# Patient Record
Sex: FEMALE | Race: WHITE | NOT HISPANIC OR LATINO | Employment: UNEMPLOYED | URBAN - METROPOLITAN AREA
[De-identification: names, ages, dates, MRNs, and addresses within clinical notes are randomized per-mention and may not be internally consistent; named-entity substitution may affect disease eponyms.]

---

## 2017-06-07 ENCOUNTER — HOSPITAL ENCOUNTER (INPATIENT)
Facility: HOSPITAL | Age: 77
LOS: 7 days | Discharge: NURSING, REHAB OR LONG-TERM CARE | DRG: 481 | End: 2017-06-14
Attending: EMERGENCY MEDICINE | Admitting: HOSPITALIST
Payer: MEDICARE

## 2017-06-07 DIAGNOSIS — S72.002A CLOSED LEFT HIP FRACTURE, INITIAL ENCOUNTER: Primary | ICD-10-CM
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
## 2017-06-07 DIAGNOSIS — Z01.818 PRE-OP TESTING: ICD-10-CM

## 2017-06-07 DIAGNOSIS — G89.11 ACUTE TRAUMATIC PAIN: ICD-10-CM

## 2017-06-07 LAB
ALBUMIN SERPL BCP-MCNC: 3.8 G/DL
ALP SERPL-CCNC: 92 U/L
ALT SERPL W/O P-5'-P-CCNC: 28 U/L
ANION GAP SERPL CALC-SCNC: 7 MMOL/L
AST SERPL-CCNC: 33 U/L
BASOPHILS # BLD AUTO: 0.03 K/UL
BASOPHILS NFR BLD: 0.4 %
BILIRUB SERPL-MCNC: 0.5 MG/DL
BILIRUB UR QL STRIP: NEGATIVE
BUN SERPL-MCNC: 16 MG/DL
CALCIUM SERPL-MCNC: 9.6 MG/DL
CHLORIDE SERPL-SCNC: 103 MMOL/L
CLARITY UR: CLEAR
CO2 SERPL-SCNC: 29 MMOL/L
COLOR UR: YELLOW
CREAT SERPL-MCNC: 0.8 MG/DL
DIFFERENTIAL METHOD: ABNORMAL
EOSINOPHIL # BLD AUTO: 0.4 K/UL
EOSINOPHIL NFR BLD: 6.1 %
ERYTHROCYTE [DISTWIDTH] IN BLOOD BY AUTOMATED COUNT: 14.3 %
EST. GFR  (AFRICAN AMERICAN): >60 ML/MIN/1.73 M^2
EST. GFR  (NON AFRICAN AMERICAN): >60 ML/MIN/1.73 M^2
GLUCOSE SERPL-MCNC: 106 MG/DL
GLUCOSE UR QL STRIP: NEGATIVE
HCT VFR BLD AUTO: 41.2 %
HGB BLD-MCNC: 13.8 G/DL
HGB UR QL STRIP: NEGATIVE
INR PPP: 1
KETONES UR QL STRIP: NEGATIVE
LEUKOCYTE ESTERASE UR QL STRIP: NEGATIVE
LYMPHOCYTES # BLD AUTO: 1.1 K/UL
LYMPHOCYTES NFR BLD: 15.3 %
MCH RBC QN AUTO: 34 PG
MCHC RBC AUTO-ENTMCNC: 33.5 %
MCV RBC AUTO: 102 FL
MONOCYTES # BLD AUTO: 0.9 K/UL
MONOCYTES NFR BLD: 12.3 %
NEUTROPHILS # BLD AUTO: 4.7 K/UL
NEUTROPHILS NFR BLD: 65.2 %
NITRITE UR QL STRIP: NEGATIVE
PH UR STRIP: 7 [PH] (ref 5–8)
PLATELET # BLD AUTO: 190 K/UL
PMV BLD AUTO: 9.6 FL
POTASSIUM SERPL-SCNC: 4.3 MMOL/L
PROT SERPL-MCNC: 7 G/DL
PROT UR QL STRIP: NEGATIVE
PROTHROMBIN TIME: 10.7 SEC
RBC # BLD AUTO: 4.06 M/UL
SODIUM SERPL-SCNC: 139 MMOL/L
SP GR UR STRIP: 1.01 (ref 1–1.03)
URN SPEC COLLECT METH UR: NORMAL
UROBILINOGEN UR STRIP-ACNC: NEGATIVE EU/DL
WBC # BLD AUTO: 7.17 K/UL

## 2017-06-07 PROCEDURE — 85610 PROTHROMBIN TIME: CPT

## 2017-06-07 PROCEDURE — 96375 TX/PRO/DX INJ NEW DRUG ADDON: CPT

## 2017-06-07 PROCEDURE — 87086 URINE CULTURE/COLONY COUNT: CPT

## 2017-06-07 PROCEDURE — 99285 EMERGENCY DEPT VISIT HI MDM: CPT | Mod: 25

## 2017-06-07 PROCEDURE — 12000002 HC ACUTE/MED SURGE SEMI-PRIVATE ROOM

## 2017-06-07 PROCEDURE — 85025 COMPLETE CBC W/AUTO DIFF WBC: CPT

## 2017-06-07 PROCEDURE — 80053 COMPREHEN METABOLIC PANEL: CPT

## 2017-06-07 PROCEDURE — 81003 URINALYSIS AUTO W/O SCOPE: CPT

## 2017-06-07 PROCEDURE — 96374 THER/PROPH/DIAG INJ IV PUSH: CPT

## 2017-06-07 PROCEDURE — 93005 ELECTROCARDIOGRAM TRACING: CPT

## 2017-06-07 PROCEDURE — 63600175 PHARM REV CODE 636 W HCPCS: Performed by: EMERGENCY MEDICINE

## 2017-06-07 RX ORDER — TRAMADOL HYDROCHLORIDE 50 MG/1
50 TABLET ORAL EVERY 6 HOURS PRN
Status: ON HOLD | COMMUNITY
End: 2017-06-14

## 2017-06-07 RX ORDER — ONDANSETRON 2 MG/ML
4 INJECTION INTRAMUSCULAR; INTRAVENOUS EVERY 8 HOURS PRN
Status: DISCONTINUED | OUTPATIENT
Start: 2017-06-07 | End: 2017-06-08

## 2017-06-07 RX ORDER — SODIUM CHLORIDE 9 MG/ML
INJECTION, SOLUTION INTRAVENOUS CONTINUOUS
Status: DISCONTINUED | OUTPATIENT
Start: 2017-06-08 | End: 2017-06-08

## 2017-06-07 RX ORDER — ONDANSETRON 2 MG/ML
4 INJECTION INTRAMUSCULAR; INTRAVENOUS
Status: COMPLETED | OUTPATIENT
Start: 2017-06-07 | End: 2017-06-07

## 2017-06-07 RX ORDER — MEMANTINE HYDROCHLORIDE 10 MG/1
28 TABLET ORAL DAILY
Status: ON HOLD | COMMUNITY
End: 2018-04-28 | Stop reason: HOSPADM

## 2017-06-07 RX ORDER — HYDROMORPHONE HYDROCHLORIDE 2 MG/ML
0.5 INJECTION, SOLUTION INTRAMUSCULAR; INTRAVENOUS; SUBCUTANEOUS
Status: COMPLETED | OUTPATIENT
Start: 2017-06-07 | End: 2017-06-07

## 2017-06-07 RX ORDER — SODIUM CHLORIDE 0.9 % (FLUSH) 0.9 %
3 SYRINGE (ML) INJECTION EVERY 8 HOURS PRN
Status: DISCONTINUED | OUTPATIENT
Start: 2017-06-07 | End: 2017-06-08

## 2017-06-07 RX ORDER — MORPHINE SULFATE 10 MG/ML
4 INJECTION INTRAMUSCULAR; INTRAVENOUS; SUBCUTANEOUS EVERY 4 HOURS PRN
Status: DISCONTINUED | OUTPATIENT
Start: 2017-06-07 | End: 2017-06-08

## 2017-06-07 RX ADMIN — HYDROMORPHONE HYDROCHLORIDE 0.5 MG: 2 INJECTION INTRAMUSCULAR; INTRAVENOUS; SUBCUTANEOUS at 06:06

## 2017-06-07 RX ADMIN — MORPHINE SULFATE 4 MG: 10 INJECTION INTRAVENOUS at 09:06

## 2017-06-07 RX ADMIN — ONDANSETRON 4 MG: 2 INJECTION INTRAMUSCULAR; INTRAVENOUS at 06:06

## 2017-06-07 RX ADMIN — SODIUM CHLORIDE: 0.9 INJECTION, SOLUTION INTRAVENOUS at 11:06

## 2017-06-07 NOTE — ED PROVIDER NOTES
Encounter Date: 6/7/2017    SCRIBE #1 NOTE: I, Eulalio Montilla, am scribing for, and in the presence of,  Sergio North MD. I have scribed the following portions of the note - Other sections scribed: HPI and ROS.       History     Chief Complaint   Patient presents with    Hip Pain     Pt. presents from home after fall. Per family report, pt. was reaching for walker while she fell. EMS personnel state that patient has shortening and rotation of the left leg. Pt. awake and alert.      Review of patient's allergies indicates:   Allergen Reactions    Statins-hmg-coa reductase inhibitors Other (See Comments)     2013: Severe muscle pain.    Demerol [meperidine]     Sulfa (sulfonamide antibiotics)      CC: Hip Pain     HPI: This 76 y.o F with HTN, asthma, RA, hypercholesteremia, hypothyroidism and PMHx of carotid stenosis, R bundle branch block and syncope presents to the the ED for emergent evaluation of a slip and fall PTA. The pt also reports severe (8/10) L side hip pain. The pt's  states the pt was reaching for her walker while in the bathroom, which slipped and caused her to fall. The pt also reports abrasions to the L forearm. The pt denies ankle pain, knee pain, head trauma and LOC. Pt is currently Rx 81 mg of aspirin daily. No prior tx.       The history is provided by the patient and the spouse. No  was used.     Past Medical History:   Diagnosis Date    Asthma     Carotid stenosis 10/7/2013    10/3/2013: Carotid Duplex: EVERT: mild. RECA: 2.0 m/s. LICA: mild    History of syncope 4/16/2015 4/13/2015: Syncope.    HTN (hypertension), malignant 3/14/2013    Hypercholesteremia 3/14/2013    Hypertension     Hypothyroidism 4/16/2015 4/13/2015: Syncope.    Rheumatoid arthritis 3/14/2013    Dr Grimes follows.    Right bundle branch block 2/6/2015 2/6/2015: Right bundle branch block noted.    Thyroid disease     Hypothyroid     Past Surgical History:   Procedure  Laterality Date    BACK SURGERY      KNEE SURGERY  12/2012    After fall     History reviewed. No pertinent family history.  Social History   Substance Use Topics    Smoking status: Former Smoker     Packs/day: 0.50     Years: 4.00     Start date: 1/1/1960     Quit date: 1/1/1964    Smokeless tobacco: Never Used    Alcohol use Yes      Comment: Wine socially     Review of Systems   Constitutional: Negative for chills and fever.   HENT: Negative for nosebleeds and sore throat.    Eyes: Negative for visual disturbance.   Respiratory: Negative for shortness of breath.    Cardiovascular: Negative for chest pain.   Gastrointestinal: Negative for nausea and vomiting.   Genitourinary: Negative for dysuria.   Musculoskeletal: Negative for back pain.        (+) L side hip pain  (-) ankle pain  (-) knee pain    Skin: Negative for rash.        (+) abrasions to L forearm   Neurological: Negative for weakness.        (-) head trauma   (-) LOC       Physical Exam     Initial Vitals [06/07/17 1804]   BP Pulse Resp Temp SpO2   (!) 144/70 90 17 98.4 °F (36.9 °C) 97 %     Physical Exam  Nursing note and vitals reviewed.  Constitutional:  Healthy appearing elderly female in no obvious distress or discomfort  HENT:    Head: NC/AT    Eyes: Conjunctivae normal.   (-) scleral icterus.              Mouth/Throat: MMM.     Neck: Neck supple, normal rom.  Negative cervical ttp  Cardiovascular: RRR  Pulmonary/Chest: CTAB   Abdominal: Soft. ND/NT w/o guarding or rebound.  (-) CVA tenderness.  Musculoskeletal: Left anterior lateral hip ttp w/ associated decreased ROM.    FROM of all other major joints.  n/v intact.   Neurological: A&Ox2 (person and place), Normal speech.  No acute focal motor deficits.     Skin: Skin is warm and dry.   Psychiatric: normal mood and affect.      ED Course   Orthopedic Injury  Date/Time: 6/7/2017 8:21 PM  Authorized by: STEVE FRANKS   Performed by: STEVE FRANKS  Injury location: hip  Location  details: left hip  Injury type: fracture  Fracture type: intertrochanteric  Pre-procedure distal perfusion: normal  Pre-procedure neurological function: normal  Pre-procedure neurovascular assessment: neurovascularly intact  Pre-procedure range of motion: reduced  Manipulation performed: no        Labs Reviewed   CBC W/ AUTO DIFFERENTIAL - Abnormal; Notable for the following:        Result Value     (*)     MCH 34.0 (*)     Lymph% 15.3 (*)     All other components within normal limits   COMPREHENSIVE METABOLIC PANEL - Abnormal; Notable for the following:     Anion Gap 7 (*)     All other components within normal limits   CULTURE, URINE   PROTIME-INR   URINALYSIS     EKG Readings: (Independently Interpreted)   Initial Reading: No STEMI.   Normal sinus rhythm, rate 83, RBBB, no ST/T-wave abnormalities.       X-Rays:   Independently Interpreted Readings:   Other Readings:  X-ray left hip - acute left intertrochanteric fracture.    Medical Decision Making:   History:   I obtained history from: someone other than patient.  Old Medical Records: I decided to obtain old medical records.  Old Records Summarized: records from clinic visits and other records.  Independently Interpreted Test(s):   I have ordered and independently interpreted X-rays - see prior notes.  I have ordered and independently interpreted EKG Reading(s) - see prior notes  Clinical Tests:   Lab Tests: Ordered and Reviewed  Radiological Study: Ordered and Reviewed  Medical Tests: Ordered and Reviewed    Differential diagnosis:   Initial differential diagnosis includes but is not limited to... Soft tissue contusion, sprain, fracture and/or dislocation    Additional Medical Decision Makin-year-old female with history of hypertension, rheumatoid arthritis, dyslipidemia, and hypothyroidism is in the emergency department via EMS c/o left hip pain status post mechanical fall - see hpi for additional details.  On exam, she has tenderness to her left  anterior lateral with associated decreased range of motion secondary to pain.  LLE neurovascular intact.  X-rays left hip as well as screening EKG and basic labs pending.    7:20 PM - x-rays of the left hip reveal an acute intertrochanteric fracture.    Orthopedics consulted (Dr. Conteh).  She will be admitted for further evaluation and management including emergent ORIF.             Scribe Attestation:   Scribe #1: I performed the above scribed service and the documentation accurately describes the services I performed. I attest to the accuracy of the note.    Attending Attestation:           Physician Attestation for Scribe:  Physician Attestation Statement for Scribe #1: I, Sergio North MD, reviewed documentation, as scribed by Eulalio Montilla in my presence, and it is both accurate and complete.                 ED Course     Clinical Impression:   The primary encounter diagnosis was Closed left hip fracture, initial encounter. Diagnoses of Acute traumatic pain and Fall were also pertinent to this visit.    Disposition:   Disposition: Admitted       Sergio North MD  06/07/17 2021       Sergio North MD  06/07/17 2021

## 2017-06-08 ENCOUNTER — ANESTHESIA (OUTPATIENT)
Dept: SURGERY | Facility: HOSPITAL | Age: 77
DRG: 481 | End: 2017-06-08
Payer: MEDICARE

## 2017-06-08 ENCOUNTER — ANESTHESIA EVENT (OUTPATIENT)
Dept: SURGERY | Facility: HOSPITAL | Age: 77
DRG: 481 | End: 2017-06-08
Payer: MEDICARE

## 2017-06-08 PROBLEM — F03.918 DEMENTIA WITH BEHAVIORAL DISTURBANCE: Chronic | Status: ACTIVE | Noted: 2017-06-08

## 2017-06-08 PROBLEM — F32.A DEPRESSION: Chronic | Status: ACTIVE | Noted: 2017-06-08

## 2017-06-08 PROBLEM — I10 ESSENTIAL HYPERTENSION: Chronic | Status: ACTIVE | Noted: 2017-06-08

## 2017-06-08 PROBLEM — F03.90 DEMENTIA WITHOUT BEHAVIORAL DISTURBANCE: Status: ACTIVE | Noted: 2017-06-08

## 2017-06-08 PROBLEM — F03.90 DEMENTIA WITHOUT BEHAVIORAL DISTURBANCE: Chronic | Status: ACTIVE | Noted: 2017-06-08

## 2017-06-08 PROBLEM — S72.142A CLOSED INTERTROCHANTERIC FRACTURE OF LEFT HIP: Status: ACTIVE | Noted: 2017-06-07

## 2017-06-08 LAB
ALBUMIN SERPL BCP-MCNC: 3.5 G/DL
ALP SERPL-CCNC: 80 U/L
ALT SERPL W/O P-5'-P-CCNC: 37 U/L
ANION GAP SERPL CALC-SCNC: 6 MMOL/L
ANION GAP SERPL CALC-SCNC: 7 MMOL/L
AST SERPL-CCNC: 50 U/L
BASOPHILS # BLD AUTO: 0.02 K/UL
BASOPHILS NFR BLD: 0.3 %
BILIRUB SERPL-MCNC: 0.6 MG/DL
BUN SERPL-MCNC: 14 MG/DL
BUN SERPL-MCNC: 15 MG/DL
CALCIUM SERPL-MCNC: 8.6 MG/DL
CALCIUM SERPL-MCNC: 9.1 MG/DL
CHLORIDE SERPL-SCNC: 104 MMOL/L
CHLORIDE SERPL-SCNC: 104 MMOL/L
CO2 SERPL-SCNC: 26 MMOL/L
CO2 SERPL-SCNC: 26 MMOL/L
CREAT SERPL-MCNC: 0.7 MG/DL
CREAT SERPL-MCNC: 0.7 MG/DL
DIFFERENTIAL METHOD: ABNORMAL
EOSINOPHIL # BLD AUTO: 0.1 K/UL
EOSINOPHIL NFR BLD: 0.8 %
ERYTHROCYTE [DISTWIDTH] IN BLOOD BY AUTOMATED COUNT: 14.6 %
EST. GFR  (AFRICAN AMERICAN): >60 ML/MIN/1.73 M^2
EST. GFR  (AFRICAN AMERICAN): >60 ML/MIN/1.73 M^2
EST. GFR  (NON AFRICAN AMERICAN): >60 ML/MIN/1.73 M^2
EST. GFR  (NON AFRICAN AMERICAN): >60 ML/MIN/1.73 M^2
GLUCOSE SERPL-MCNC: 112 MG/DL
GLUCOSE SERPL-MCNC: 120 MG/DL
HCT VFR BLD AUTO: 35 %
HCT VFR BLD AUTO: 38.6 %
HGB BLD-MCNC: 11.5 G/DL
HGB BLD-MCNC: 13.1 G/DL
LYMPHOCYTES # BLD AUTO: 1.1 K/UL
LYMPHOCYTES NFR BLD: 14.1 %
MCH RBC QN AUTO: 34.8 PG
MCHC RBC AUTO-ENTMCNC: 33.9 %
MCV RBC AUTO: 103 FL
MONOCYTES # BLD AUTO: 1 K/UL
MONOCYTES NFR BLD: 13 %
NEUTROPHILS # BLD AUTO: 5.5 K/UL
NEUTROPHILS NFR BLD: 71.8 %
PLATELET # BLD AUTO: 162 K/UL
PMV BLD AUTO: 10.1 FL
POTASSIUM SERPL-SCNC: 3.9 MMOL/L
POTASSIUM SERPL-SCNC: 4.2 MMOL/L
PROT SERPL-MCNC: 6.7 G/DL
RBC # BLD AUTO: 3.76 M/UL
SODIUM SERPL-SCNC: 136 MMOL/L
SODIUM SERPL-SCNC: 137 MMOL/L
WBC # BLD AUTO: 7.68 K/UL

## 2017-06-08 PROCEDURE — 63600175 PHARM REV CODE 636 W HCPCS: Performed by: NURSE ANESTHETIST, CERTIFIED REGISTERED

## 2017-06-08 PROCEDURE — 80048 BASIC METABOLIC PNL TOTAL CA: CPT

## 2017-06-08 PROCEDURE — 27200688 HC TRAY, SPINAL-HYPER/ ISOBARIC: Performed by: ANESTHESIOLOGY

## 2017-06-08 PROCEDURE — 25000003 PHARM REV CODE 250: Performed by: INTERNAL MEDICINE

## 2017-06-08 PROCEDURE — 94799 UNLISTED PULMONARY SVC/PX: CPT

## 2017-06-08 PROCEDURE — 37000008 HC ANESTHESIA 1ST 15 MINUTES: Performed by: ORTHOPAEDIC SURGERY

## 2017-06-08 PROCEDURE — 85025 COMPLETE CBC W/AUTO DIFF WBC: CPT

## 2017-06-08 PROCEDURE — 36000711: Performed by: ORTHOPAEDIC SURGERY

## 2017-06-08 PROCEDURE — 36000710: Performed by: ORTHOPAEDIC SURGERY

## 2017-06-08 PROCEDURE — 85018 HEMOGLOBIN: CPT

## 2017-06-08 PROCEDURE — 0QS736Z REPOSITION LEFT UPPER FEMUR WITH INTRAMEDULLARY INTERNAL FIXATION DEVICE, PERCUTANEOUS APPROACH: ICD-10-PCS | Performed by: ORTHOPAEDIC SURGERY

## 2017-06-08 PROCEDURE — 25000003 PHARM REV CODE 250: Performed by: NURSE ANESTHETIST, CERTIFIED REGISTERED

## 2017-06-08 PROCEDURE — 80053 COMPREHEN METABOLIC PANEL: CPT

## 2017-06-08 PROCEDURE — 63600175 PHARM REV CODE 636 W HCPCS: Performed by: ANESTHESIOLOGY

## 2017-06-08 PROCEDURE — C1713 ANCHOR/SCREW BN/BN,TIS/BN: HCPCS | Performed by: ORTHOPAEDIC SURGERY

## 2017-06-08 PROCEDURE — 63600175 PHARM REV CODE 636 W HCPCS: Performed by: EMERGENCY MEDICINE

## 2017-06-08 PROCEDURE — 37000009 HC ANESTHESIA EA ADD 15 MINS: Performed by: ORTHOPAEDIC SURGERY

## 2017-06-08 PROCEDURE — 71000039 HC RECOVERY, EACH ADD'L HOUR: Performed by: ORTHOPAEDIC SURGERY

## 2017-06-08 PROCEDURE — 85014 HEMATOCRIT: CPT

## 2017-06-08 PROCEDURE — 36415 COLL VENOUS BLD VENIPUNCTURE: CPT

## 2017-06-08 PROCEDURE — D9220A PRA ANESTHESIA: Mod: CRNA,,, | Performed by: NURSE ANESTHETIST, CERTIFIED REGISTERED

## 2017-06-08 PROCEDURE — C1769 GUIDE WIRE: HCPCS | Performed by: ORTHOPAEDIC SURGERY

## 2017-06-08 PROCEDURE — D9220A PRA ANESTHESIA: Mod: ANES,,, | Performed by: ANESTHESIOLOGY

## 2017-06-08 PROCEDURE — 25000003 PHARM REV CODE 250: Performed by: EMERGENCY MEDICINE

## 2017-06-08 PROCEDURE — 12000002 HC ACUTE/MED SURGE SEMI-PRIVATE ROOM

## 2017-06-08 PROCEDURE — 27201423 OPTIME MED/SURG SUP & DEVICES STERILE SUPPLY: Performed by: ORTHOPAEDIC SURGERY

## 2017-06-08 PROCEDURE — 63600175 PHARM REV CODE 636 W HCPCS: Performed by: ORTHOPAEDIC SURGERY

## 2017-06-08 PROCEDURE — 71000033 HC RECOVERY, INTIAL HOUR: Performed by: ORTHOPAEDIC SURGERY

## 2017-06-08 DEVICE — SCREW PROXIMAL LOCK 5X37.5MM: Type: IMPLANTABLE DEVICE | Site: FEMUR | Status: FUNCTIONAL

## 2017-06-08 DEVICE — SCREW GAMMA3 10.5 THRD 100MM: Type: IMPLANTABLE DEVICE | Site: FEMUR | Status: FUNCTIONAL

## 2017-06-08 RX ORDER — AMLODIPINE BESYLATE 5 MG/1
5 TABLET ORAL DAILY
Status: DISCONTINUED | OUTPATIENT
Start: 2017-06-08 | End: 2017-06-13

## 2017-06-08 RX ORDER — CEFAZOLIN SODIUM 2 G/50ML
2 SOLUTION INTRAVENOUS
Status: ACTIVE | OUTPATIENT
Start: 2017-06-08 | End: 2017-06-09

## 2017-06-08 RX ORDER — SERTRALINE HYDROCHLORIDE 25 MG/1
25 TABLET, FILM COATED ORAL DAILY
Status: DISCONTINUED | OUTPATIENT
Start: 2017-06-08 | End: 2017-06-14 | Stop reason: HOSPADM

## 2017-06-08 RX ORDER — SERTRALINE HYDROCHLORIDE 50 MG/1
100 TABLET, FILM COATED ORAL DAILY
Status: DISCONTINUED | OUTPATIENT
Start: 2017-06-08 | End: 2017-06-08

## 2017-06-08 RX ORDER — MEMANTINE HYDROCHLORIDE 10 MG/1
10 TABLET ORAL DAILY
Status: DISCONTINUED | OUTPATIENT
Start: 2017-06-08 | End: 2017-06-08

## 2017-06-08 RX ORDER — ENOXAPARIN SODIUM 100 MG/ML
40 INJECTION SUBCUTANEOUS EVERY 24 HOURS
Status: DISCONTINUED | OUTPATIENT
Start: 2017-06-08 | End: 2017-06-08

## 2017-06-08 RX ORDER — FAMOTIDINE 10 MG/ML
INJECTION INTRAVENOUS
Status: DISCONTINUED | OUTPATIENT
Start: 2017-06-08 | End: 2017-06-08

## 2017-06-08 RX ORDER — SODIUM CHLORIDE 0.9 % (FLUSH) 0.9 %
3 SYRINGE (ML) INJECTION EVERY 8 HOURS
Status: DISCONTINUED | OUTPATIENT
Start: 2017-06-08 | End: 2017-06-08

## 2017-06-08 RX ORDER — OXYCODONE HYDROCHLORIDE 5 MG/1
5 TABLET ORAL
Status: DISCONTINUED | OUTPATIENT
Start: 2017-06-08 | End: 2017-06-14 | Stop reason: HOSPADM

## 2017-06-08 RX ORDER — CEFAZOLIN SODIUM 2 G/50ML
SOLUTION INTRAVENOUS
Status: DISPENSED
Start: 2017-06-08 | End: 2017-06-09

## 2017-06-08 RX ORDER — ASPIRIN 81 MG/1
81 TABLET ORAL DAILY
Status: DISCONTINUED | OUTPATIENT
Start: 2017-06-08 | End: 2017-06-14 | Stop reason: HOSPADM

## 2017-06-08 RX ORDER — FAMOTIDINE 20 MG/50ML
INJECTION, SOLUTION INTRAVENOUS
Status: DISPENSED
Start: 2017-06-08 | End: 2017-06-09

## 2017-06-08 RX ORDER — HYDROCODONE BITARTRATE AND ACETAMINOPHEN 5; 325 MG/1; MG/1
1 TABLET ORAL EVERY 4 HOURS PRN
Status: DISCONTINUED | OUTPATIENT
Start: 2017-06-08 | End: 2017-06-14 | Stop reason: HOSPADM

## 2017-06-08 RX ORDER — SODIUM CHLORIDE 9 MG/ML
INJECTION, SOLUTION INTRAVENOUS CONTINUOUS
Status: ACTIVE | OUTPATIENT
Start: 2017-06-08 | End: 2017-06-09

## 2017-06-08 RX ORDER — BUPROPION HYDROCHLORIDE 150 MG/1
150 TABLET, EXTENDED RELEASE ORAL DAILY
Status: DISCONTINUED | OUTPATIENT
Start: 2017-06-08 | End: 2017-06-08

## 2017-06-08 RX ORDER — MORPHINE SULFATE 10 MG/ML
4 INJECTION INTRAMUSCULAR; INTRAVENOUS; SUBCUTANEOUS EVERY 4 HOURS PRN
Status: DISCONTINUED | OUTPATIENT
Start: 2017-06-08 | End: 2017-06-08

## 2017-06-08 RX ORDER — CEFAZOLIN SODIUM 2 G/50ML
2 SOLUTION INTRAVENOUS
Status: COMPLETED | OUTPATIENT
Start: 2017-06-08 | End: 2017-06-09

## 2017-06-08 RX ORDER — HYDROMORPHONE HYDROCHLORIDE 2 MG/ML
0.2 INJECTION, SOLUTION INTRAMUSCULAR; INTRAVENOUS; SUBCUTANEOUS EVERY 5 MIN PRN
Status: DISCONTINUED | OUTPATIENT
Start: 2017-06-08 | End: 2017-06-14 | Stop reason: HOSPADM

## 2017-06-08 RX ORDER — PROPOFOL 10 MG/ML
VIAL (ML) INTRAVENOUS
Status: DISCONTINUED | OUTPATIENT
Start: 2017-06-08 | End: 2017-06-08

## 2017-06-08 RX ORDER — PHENYLEPHRINE HYDROCHLORIDE 10 MG/ML
INJECTION INTRAVENOUS
Status: DISCONTINUED | OUTPATIENT
Start: 2017-06-08 | End: 2017-06-08

## 2017-06-08 RX ORDER — SODIUM CHLORIDE, SODIUM LACTATE, POTASSIUM CHLORIDE, CALCIUM CHLORIDE 600; 310; 30; 20 MG/100ML; MG/100ML; MG/100ML; MG/100ML
INJECTION, SOLUTION INTRAVENOUS CONTINUOUS PRN
Status: DISCONTINUED | OUTPATIENT
Start: 2017-06-08 | End: 2017-06-08

## 2017-06-08 RX ORDER — FOLIC ACID 1 MG/1
1 TABLET ORAL DAILY
Status: DISCONTINUED | OUTPATIENT
Start: 2017-06-08 | End: 2017-06-14 | Stop reason: HOSPADM

## 2017-06-08 RX ORDER — CLONIDINE HYDROCHLORIDE 0.1 MG/1
0.1 TABLET ORAL 3 TIMES DAILY PRN
Status: DISCONTINUED | OUTPATIENT
Start: 2017-06-08 | End: 2017-06-14 | Stop reason: HOSPADM

## 2017-06-08 RX ORDER — CEFAZOLIN SODIUM 1 G/3ML
INJECTION, POWDER, FOR SOLUTION INTRAMUSCULAR; INTRAVENOUS
Status: DISCONTINUED | OUTPATIENT
Start: 2017-06-08 | End: 2017-06-08

## 2017-06-08 RX ORDER — SODIUM CHLORIDE 0.9 % (FLUSH) 0.9 %
3 SYRINGE (ML) INJECTION
Status: DISCONTINUED | OUTPATIENT
Start: 2017-06-08 | End: 2017-06-14 | Stop reason: HOSPADM

## 2017-06-08 RX ORDER — PHENYLEPHRINE HCL IN 0.9% NACL 1 MG/10 ML
SYRINGE (ML) INTRAVENOUS
Status: DISPENSED
Start: 2017-06-08 | End: 2017-06-09

## 2017-06-08 RX ORDER — MEMANTINE HYDROCHLORIDE 28 MG/1
1 CAPSULE, EXTENDED RELEASE ORAL DAILY
Status: DISCONTINUED | OUTPATIENT
Start: 2017-06-09 | End: 2017-06-14 | Stop reason: HOSPADM

## 2017-06-08 RX ORDER — OXYCODONE AND ACETAMINOPHEN 5; 325 MG/1; MG/1
1 TABLET ORAL
Status: DISCONTINUED | OUTPATIENT
Start: 2017-06-08 | End: 2017-06-14 | Stop reason: HOSPADM

## 2017-06-08 RX ORDER — BUPIVACAINE HYDROCHLORIDE 5 MG/ML
INJECTION, SOLUTION EPIDURAL; INTRACAUDAL
Status: DISPENSED
Start: 2017-06-08 | End: 2017-06-08

## 2017-06-08 RX ORDER — MORPHINE SULFATE 10 MG/ML
2 INJECTION INTRAMUSCULAR; INTRAVENOUS; SUBCUTANEOUS EVERY 6 HOURS PRN
Status: DISCONTINUED | OUTPATIENT
Start: 2017-06-08 | End: 2017-06-14 | Stop reason: HOSPADM

## 2017-06-08 RX ORDER — RAMELTEON 8 MG/1
8 TABLET ORAL NIGHTLY PRN
Status: DISCONTINUED | OUTPATIENT
Start: 2017-06-08 | End: 2017-06-14 | Stop reason: HOSPADM

## 2017-06-08 RX ORDER — ONDANSETRON 2 MG/ML
4 INJECTION INTRAMUSCULAR; INTRAVENOUS EVERY 8 HOURS PRN
Status: DISCONTINUED | OUTPATIENT
Start: 2017-06-08 | End: 2017-06-14 | Stop reason: HOSPADM

## 2017-06-08 RX ORDER — FENTANYL CITRATE 50 UG/ML
INJECTION, SOLUTION INTRAMUSCULAR; INTRAVENOUS
Status: DISPENSED
Start: 2017-06-08 | End: 2017-06-08

## 2017-06-08 RX ORDER — ACETAMINOPHEN 500 MG
500 TABLET ORAL EVERY 6 HOURS PRN
Status: DISCONTINUED | OUTPATIENT
Start: 2017-06-08 | End: 2017-06-14 | Stop reason: HOSPADM

## 2017-06-08 RX ORDER — ONDANSETRON 2 MG/ML
8 INJECTION INTRAMUSCULAR; INTRAVENOUS EVERY 8 HOURS PRN
Status: DISCONTINUED | OUTPATIENT
Start: 2017-06-08 | End: 2017-06-14 | Stop reason: HOSPADM

## 2017-06-08 RX ORDER — LEVOTHYROXINE SODIUM 25 UG/1
25 TABLET ORAL
Status: DISCONTINUED | OUTPATIENT
Start: 2017-06-08 | End: 2017-06-14 | Stop reason: HOSPADM

## 2017-06-08 RX ORDER — FENTANYL CITRATE 50 UG/ML
INJECTION, SOLUTION INTRAMUSCULAR; INTRAVENOUS
Status: DISCONTINUED | OUTPATIENT
Start: 2017-06-08 | End: 2017-06-08

## 2017-06-08 RX ORDER — ENOXAPARIN SODIUM 100 MG/ML
40 INJECTION SUBCUTANEOUS
Status: DISCONTINUED | OUTPATIENT
Start: 2017-06-09 | End: 2017-06-14 | Stop reason: HOSPADM

## 2017-06-08 RX ORDER — HYDROMORPHONE HYDROCHLORIDE 2 MG/ML
INJECTION, SOLUTION INTRAMUSCULAR; INTRAVENOUS; SUBCUTANEOUS
Status: DISPENSED
Start: 2017-06-08 | End: 2017-06-09

## 2017-06-08 RX ORDER — ENOXAPARIN SODIUM 100 MG/ML
30 INJECTION SUBCUTANEOUS EVERY 24 HOURS
Status: DISCONTINUED | OUTPATIENT
Start: 2017-06-08 | End: 2017-06-08

## 2017-06-08 RX ORDER — METOCLOPRAMIDE HYDROCHLORIDE 5 MG/ML
10 INJECTION INTRAMUSCULAR; INTRAVENOUS EVERY 10 MIN PRN
Status: DISCONTINUED | OUTPATIENT
Start: 2017-06-08 | End: 2017-06-14 | Stop reason: HOSPADM

## 2017-06-08 RX ADMIN — FOLIC ACID 1 MG: 1 TABLET ORAL at 09:06

## 2017-06-08 RX ADMIN — HYDROMORPHONE HYDROCHLORIDE 0.2 MG: 2 INJECTION INTRAMUSCULAR; INTRAVENOUS; SUBCUTANEOUS at 03:06

## 2017-06-08 RX ADMIN — FENTANYL CITRATE 50 MCG: 50 INJECTION INTRAMUSCULAR; INTRAVENOUS at 11:06

## 2017-06-08 RX ADMIN — PHENYLEPHRINE HYDROCHLORIDE 200 MCG: 10 INJECTION INTRAVENOUS at 12:06

## 2017-06-08 RX ADMIN — LEVOTHYROXINE SODIUM 25 MCG: 25 TABLET ORAL at 05:06

## 2017-06-08 RX ADMIN — SODIUM CHLORIDE, SODIUM LACTATE, POTASSIUM CHLORIDE, AND CALCIUM CHLORIDE: .6; .31; .03; .02 INJECTION, SOLUTION INTRAVENOUS at 11:06

## 2017-06-08 RX ADMIN — MORPHINE SULFATE 4 MG: 10 INJECTION INTRAVENOUS at 03:06

## 2017-06-08 RX ADMIN — ENOXAPARIN SODIUM 40 MG: 100 INJECTION SUBCUTANEOUS at 11:06

## 2017-06-08 RX ADMIN — HYDROCODONE BITARTRATE AND ACETAMINOPHEN 1 TABLET: 5; 325 TABLET ORAL at 09:06

## 2017-06-08 RX ADMIN — CEFAZOLIN 2 MG: 1 INJECTION, POWDER, FOR SOLUTION INTRAVENOUS at 12:06

## 2017-06-08 RX ADMIN — AMLODIPINE BESYLATE 5 MG: 5 TABLET ORAL at 09:06

## 2017-06-08 RX ADMIN — CEFAZOLIN SODIUM 2 G: 2 SOLUTION INTRAVENOUS at 09:06

## 2017-06-08 RX ADMIN — FENTANYL CITRATE 50 MCG: 50 INJECTION INTRAMUSCULAR; INTRAVENOUS at 12:06

## 2017-06-08 RX ADMIN — PROPOFOL 10 MG: 10 INJECTION, EMULSION INTRAVENOUS at 01:06

## 2017-06-08 RX ADMIN — FAMOTIDINE 20 MG: 10 INJECTION, SOLUTION INTRAVENOUS at 12:06

## 2017-06-08 RX ADMIN — PROPOFOL 10 MG: 10 INJECTION, EMULSION INTRAVENOUS at 12:06

## 2017-06-08 RX ADMIN — PROPOFOL 20 MG: 10 INJECTION, EMULSION INTRAVENOUS at 12:06

## 2017-06-08 RX ADMIN — ASPIRIN 81 MG: 81 TABLET, COATED ORAL at 09:06

## 2017-06-08 RX ADMIN — HYDROCODONE BITARTRATE AND ACETAMINOPHEN 1 TABLET: 5; 325 TABLET ORAL at 11:06

## 2017-06-08 RX ADMIN — PROPOFOL 30 MG: 10 INJECTION, EMULSION INTRAVENOUS at 01:06

## 2017-06-08 NOTE — SUBJECTIVE & OBJECTIVE
Past Medical History:   Diagnosis Date    Asthma     Carotid stenosis 10/7/2013    10/3/2013: Carotid Duplex: EVERT: mild. RECA: 2.0 m/s. LICA: mild    History of syncope 4/16/2015 4/13/2015: Syncope.    HTN (hypertension), malignant 3/14/2013    Hypercholesteremia 3/14/2013    Hypertension     Hypothyroidism 4/16/2015 4/13/2015: Syncope.    Rheumatoid arthritis 3/14/2013    Dr Grimes follows.    Right bundle branch block 2/6/2015 2/6/2015: Right bundle branch block noted.    Thyroid disease     Hypothyroid       Past Surgical History:   Procedure Laterality Date    BACK SURGERY      KNEE SURGERY  12/2012    After fall       Review of patient's allergies indicates:   Allergen Reactions    Statins-hmg-coa reductase inhibitors Other (See Comments)     2013: Severe muscle pain.    Demerol [meperidine]     Sulfa (sulfonamide antibiotics)        No current facility-administered medications on file prior to encounter.      Current Outpatient Prescriptions on File Prior to Encounter   Medication Sig    amlodipine (NORVASC) 5 MG tablet Take 1 tablet (5 mg total) by mouth once daily.    aspirin (ECOTRIN) 81 MG EC tablet Take 1 tablet (81 mg total) by mouth once daily.    CETIRIZINE HCL (ZYRTEC ORAL) Take 1 capsule by mouth once daily.    folic acid (FOLVITE) 1 MG tablet     levothyroxine (SYNTHROID) 50 MCG tablet Take 25 mcg by mouth before breakfast.     lorazepam (ATIVAN) 1 MG tablet Take 1 mg by mouth 3 (three) times daily.    MYRBETRIQ 25 mg Tb24     MYRBETRIQ 50 mg Tb24     sertraline (ZOLOFT) 100 MG tablet Take 100 mg by mouth 2 (two) times daily.    azelastine (ASTELIN) 137 mcg nasal spray 1 spray by Nasal route as needed.    buPROPion (WELLBUTRIN SR) 150 MG TBSR 12 hr tablet Take 150 mg by mouth once daily.    chlorhexidine (PERIDEX) 0.12 % solution     fexofenadine-pseudoephedrine (ALLEGRA-D 24) 180-240 mg per 24 hr tablet Take 1 tablet by mouth once daily.    methotrexate 2.5  MG Tab Take 20 mg by mouth every 7 days.     mometasone (NASONEX) 50 mcg/actuation nasal spray 2 sprays by Nasal route once daily. As needed    montelukast (SINGULAIR) 10 mg tablet Take 10 mg by mouth every evening.    OXYCODONE HCL (OXYCODONE, BULK, MISC) 1 capsule by Misc.(Non-Drug; Combo Route) route 2 (two) times daily. 3.5 mg.    phenylephrine-chlorpheniramine-dihydrocodeine (PANCOF-PD) 2-7.5-3 mg/5 mL syrup Take 5 mLs by mouth every 6 (six) hours as needed. As needed    predniSONE (DELTASONE) 10 MG tablet     trazodone-dietary supp #8 50 mg Cmpk Take 2 capsules by mouth 2 (two) times daily. 100 mg    URIBEL 118-10-40.8-36 mg Cap      Family History     None        Social History Main Topics    Smoking status: Former Smoker     Packs/day: 0.50     Years: 4.00     Start date: 1/1/1960     Quit date: 1/1/1964    Smokeless tobacco: Never Used    Alcohol use Yes      Comment: Wine socially    Drug use: Unknown    Sexual activity: Not on file     Review of Systems   Unable to perform ROS: Dementia     Objective:     Vital Signs (Most Recent):  Temp: 99.8 °F (37.7 °C) (06/07/17 2248)  Pulse: 95 (06/07/17 2248)  Resp: 18 (06/07/17 2248)  BP: (!) 140/64 (06/07/17 2248)  SpO2: 96 % (06/07/17 2248) Vital Signs (24h Range):  Temp:  [98.4 °F (36.9 °C)-99.8 °F (37.7 °C)] 99.8 °F (37.7 °C)  Pulse:  [82-95] 95  Resp:  [17-18] 18  SpO2:  [94 %-100 %] 96 %  BP: (140-164)/(64-71) 140/64     Weight: 33.8 kg (74 lb 9 oz)  Body mass index is 12.22 kg/m².    Physical Exam   Constitutional: She appears well-developed and well-nourished. No distress.   Thin & frail   HENT:   Head: Normocephalic and atraumatic.   Right Ear: External ear normal.   Left Ear: External ear normal.   Nose: Nose normal.   Eyes: Right eye exhibits no discharge. Left eye exhibits no discharge.   Neck: Normal range of motion.   Cardiovascular: Normal rate, regular rhythm, normal heart sounds and intact distal pulses.  Exam reveals no gallop and no  friction rub.    No murmur heard.  Pulmonary/Chest: Effort normal and breath sounds normal. No respiratory distress. She has no wheezes. She has no rales. She exhibits no tenderness.   Abdominal: Soft. Bowel sounds are normal. She exhibits no distension. There is no tenderness. There is no rebound and no guarding.   Musculoskeletal:   Left lower extremity shortened and externally rotated.   Neurological:   Alert but oriented only to person and time, not to place or situation.   Skin: Skin is warm and dry. She is not diaphoretic.   Psychiatric: She has a normal mood and affect. Her behavior is normal. Judgment and thought content normal.   Nursing note and vitals reviewed.       Significant Labs: All pertinent labs within the past 24 hours have been reviewed.    Significant Imaging: I have reviewed and interpreted all pertinent imaging results/findings within the past 24 hours.

## 2017-06-08 NOTE — CONSULTS
75 yo female with a sig pmhx of RA, dementia, HTN, and left foot drop presented to the ED with a chief complaint of left hip pain following a mechanical fall at her home. She fell while trying to use her walker in her bathroom. Her  is at the bedside. He stated she has been falling more over the past couple of weeks and has been more confused lately. She lives at home with her  and has a nurse's aid who comes to their house regularly to help. She ambulates half the time with a walker and half normally. She denies any CP, SOB, LOC, or other injury.  VSSAF    Left hip: Skin intact and warm to touch. No bruising. Leg shortened and externally rotated. ROM limited due to pain. TTP on lateral hip. She confirms sensation distal to injury. Unable to fully flex at the ankle and great toe. DP pulse appreciated.   Hct: 43.0  XRay left hip: Left IT femur fracture    A/P: Left IT femur fracture  1) admit to hospitalist  2) NPO at midnight  3) to OR tomorrow for IM nailing left hip

## 2017-06-08 NOTE — PLAN OF CARE
06/08/17 1351   Discharge Assessment   Assessment Type Discharge Planning Assessment   Patient/Family In Agreement With Plan unable to assess   SW unable to complete assessment.  Patient out of room for surgery.  Karrie abbasi, JAVIER, ROMAN-CHASITY, [facility]  06/08/2017

## 2017-06-08 NOTE — ANESTHESIA PREPROCEDURE EVALUATION
06/08/2017  Pearl Woodard is a 76 y.o., female.    Anesthesia Evaluation    I have reviewed the Patient Summary Reports.     I have reviewed the Medications.     Review of Systems  Anesthesia Hx:  No problems with previous Anesthesia    Social:  Former Smoker, Alcohol Use    Cardiovascular:   Exercise tolerance: poor Hypertension Dysrhythmias hyperlipidemia ECG has been reviewed.    Pulmonary:   Asthma    Musculoskeletal:   Arthritis     Endocrine:   Hypothyroidism    Psych:   Psychiatric History depression          Physical Exam  General:  Cachexia    Airway/Jaw/Neck:  Airway Findings: Mouth Opening: Normal Tongue: Normal  General Airway Assessment: Adult  Mallampati: II  TM Distance: Normal, at least 6 cm  Jaw/Neck Findings:  Neck ROM: Normal ROM      Dental:  Dental Findings: In tact   Chest/Lungs:  Chest/Lungs Findings: Clear to auscultation, Normal Respiratory Rate     Heart/Vascular:  Heart Findings: Rate: Normal        Mental Status:  Mental Status Findings:  Cooperative, Confusion         Anesthesia Plan  Type of Anesthesia, risks & benefits discussed:  Anesthesia Type:  spinal  Patient's Preference:   Intra-op Monitoring Plan: standard ASA monitors  Intra-op Monitoring Plan Comments:   Post Op Pain Control Plan:   Post Op Pain Control Plan Comments:   Induction:   IV  Beta Blocker:  Patient is not currently on a Beta-Blocker (No further documentation required).       Informed Consent: Patient understands risks and agrees with Anesthesia plan.  Questions answered. Anesthesia consent signed with patient.  ASA Score: 3     Day of Surgery Review of History & Physical:    H&P update referred to the surgeon.         Ready For Surgery From Anesthesia Perspective.

## 2017-06-08 NOTE — ASSESSMENT & PLAN NOTE
Patient is planned for repair in the morning.  She cannot provide a meaningful history but from what information available, her Revised Cardiac Risk Index suggests that she is at very low risk for perioperative cardiac events.  Will await further recommendations from Orthopedic Surgery.

## 2017-06-08 NOTE — H&P
[facility] Hospital Medicine  History & Physical    Patient Name: Pearl Woodard  MRN: [id number]  Admission Date: 6/7/2017  Attending Physician: Maggie Shelby MD   Primary Care Provider: Jarad Daniels MD         Patient information was obtained from ER records.     Subjective:     Principal Problem:Closed intertrochanteric fracture of left hip    Chief Complaint: Fall today.    HPI: Mrs. Pearl Woodard is a 76 y.o. female with essential hypertension, hypothyroidism (TSH unknown), rheumatoid arthritis, depression, and dementia who presents to [facility] ED with complaints of a fall today.  She was in her bathroom and was reaching for her walker tonight when she slipped and fell.  She cannot contribute meaningfully to her interview only to say that she is here for a fall and that she is nervous.  Further history is limited at this time.    Chart Review:  Patient has not had any recent hospitalizations within the system.     Outpatient Follow-Up  Date of Visit Physician Service   Apr 2017 Tea Weeks MD Cardiology      Past Medical History:   Diagnosis Date    Asthma     Carotid stenosis 10/7/2013    10/3/2013: Carotid Duplex: EVERT: mild. RECA: 2.0 m/s. LICA: mild    History of syncope 4/16/2015 4/13/2015: Syncope.    HTN (hypertension), malignant 3/14/2013    Hypercholesteremia 3/14/2013    Hypertension     Hypothyroidism 4/16/2015 4/13/2015: Syncope.    Rheumatoid arthritis 3/14/2013    Dr Grimes follows.    Right bundle branch block 2/6/2015 2/6/2015: Right bundle branch block noted.    Thyroid disease     Hypothyroid       Past Surgical History:   Procedure Laterality Date    BACK SURGERY      KNEE SURGERY  12/2012    After fall       Review of patient's allergies indicates:   Allergen Reactions    Statins-hmg-coa reductase inhibitors Other (See Comments)     2013: Severe muscle pain.    Demerol [meperidine]     Sulfa (sulfonamide antibiotics)        No  current facility-administered medications on file prior to encounter.      Current Outpatient Prescriptions on File Prior to Encounter   Medication Sig    amlodipine (NORVASC) 5 MG tablet Take 1 tablet (5 mg total) by mouth once daily.    aspirin (ECOTRIN) 81 MG EC tablet Take 1 tablet (81 mg total) by mouth once daily.    CETIRIZINE HCL (ZYRTEC ORAL) Take 1 capsule by mouth once daily.    folic acid (FOLVITE) 1 MG tablet     levothyroxine (SYNTHROID) 50 MCG tablet Take 25 mcg by mouth before breakfast.     lorazepam (ATIVAN) 1 MG tablet Take 1 mg by mouth 3 (three) times daily.    MYRBETRIQ 25 mg Tb24     MYRBETRIQ 50 mg Tb24     sertraline (ZOLOFT) 100 MG tablet Take 100 mg by mouth 2 (two) times daily.    azelastine (ASTELIN) 137 mcg nasal spray 1 spray by Nasal route as needed.    buPROPion (WELLBUTRIN SR) 150 MG TBSR 12 hr tablet Take 150 mg by mouth once daily.    chlorhexidine (PERIDEX) 0.12 % solution     fexofenadine-pseudoephedrine (ALLEGRA-D 24) 180-240 mg per 24 hr tablet Take 1 tablet by mouth once daily.    methotrexate 2.5 MG Tab Take 20 mg by mouth every 7 days.     mometasone (NASONEX) 50 mcg/actuation nasal spray 2 sprays by Nasal route once daily. As needed    montelukast (SINGULAIR) 10 mg tablet Take 10 mg by mouth every evening.    OXYCODONE HCL (OXYCODONE, BULK, MISC) 1 capsule by Misc.(Non-Drug; Combo Route) route 2 (two) times daily. 3.5 mg.    phenylephrine-chlorpheniramine-dihydrocodeine (PANCOF-PD) 2-7.5-3 mg/5 mL syrup Take 5 mLs by mouth every 6 (six) hours as needed. As needed    predniSONE (DELTASONE) 10 MG tablet     trazodone-dietary supp #8 50 mg Cmpk Take 2 capsules by mouth 2 (two) times daily. 100 mg    URIBEL 118-10-40.8-36 mg Cap      Family History     None        Social History Main Topics    Smoking status: Former Smoker     Packs/day: 0.50     Years: 4.00     Start date: 1/1/1960     Quit date: 1/1/1964    Smokeless tobacco: Never Used    Alcohol use  Yes      Comment: Wine socially    Drug use: Unknown    Sexual activity: Not on file     Review of Systems   Unable to perform ROS: Dementia     Objective:     Vital Signs (Most Recent):  Temp: 99.8 °F (37.7 °C) (06/07/17 2248)  Pulse: 95 (06/07/17 2248)  Resp: 18 (06/07/17 2248)  BP: (!) 140/64 (06/07/17 2248)  SpO2: 96 % (06/07/17 2248) Vital Signs (24h Range):  Temp:  [98.4 °F (36.9 °C)-99.8 °F (37.7 °C)] 99.8 °F (37.7 °C)  Pulse:  [82-95] 95  Resp:  [17-18] 18  SpO2:  [94 %-100 %] 96 %  BP: (140-164)/(64-71) 140/64     Weight: 33.8 kg (74 lb 9 oz)  Body mass index is 12.22 kg/m².    Physical Exam   Constitutional: She appears well-developed and well-nourished. No distress.   Thin & frail   HENT:   Head: Normocephalic and atraumatic.   Right Ear: External ear normal.   Left Ear: External ear normal.   Nose: Nose normal.   Eyes: Right eye exhibits no discharge. Left eye exhibits no discharge.   Neck: Normal range of motion.   Cardiovascular: Normal rate, regular rhythm, normal heart sounds and intact distal pulses.  Exam reveals no gallop and no friction rub.    No murmur heard.  Pulmonary/Chest: Effort normal and breath sounds normal. No respiratory distress. She has no wheezes. She has no rales. She exhibits no tenderness.   Abdominal: Soft. Bowel sounds are normal. She exhibits no distension. There is no tenderness. There is no rebound and no guarding.   Musculoskeletal:   Left lower extremity shortened and externally rotated.   Neurological:   Alert but oriented only to person and time, not to place or situation.   Skin: Skin is warm and dry. She is not diaphoretic.   Psychiatric: She has a normal mood and affect. Her behavior is normal. Judgment and thought content normal.   Nursing note and vitals reviewed.       Significant Labs: All pertinent labs within the past 24 hours have been reviewed.    Significant Imaging: I have reviewed and interpreted all pertinent imaging results/findings within the past 24  hours.    Assessment/Plan:     * Closed intertrochanteric fracture of left hip    Patient is planned for repair in the morning.  She cannot provide a meaningful history but from what information available, her Revised Cardiac Risk Index suggests that she is at very low risk for perioperative cardiac events.  Will await further recommendations from Orthopedic Surgery.        Essential hypertension    Patient's blood pressure is well-controlled; will continue home regimen of amlodipine, and provide as-needed clonidine.        Hypothyroidism    Will continue her home regimen of levothyroxine.        Rheumatoid arthritis    Stable; will continue her home regimen of methotrexate.        Depression    Stable; will continue her home regimen of sertraline.        Dementia without behavioral disturbance    Stable; will continue her home regimen of memantine.          VTE Risk Mitigation         Ordered     enoxaparin injection 40 mg  Daily     Route:  Subcutaneous        06/08/17 0418     Medium Risk of VTE  Once      06/08/17 0418            Total time spent on case: 45 minutes.        Yue Kelley M.D.  Staff Nocturnist  Department of Hospital Medicine  [facility]  Pager: [phone]

## 2017-06-08 NOTE — PROGRESS NOTES
"Ortho Daily Progress Note    Pearl Woodard is a 76 y.o. female admitted on 6/7/2017      Chief Complaint/Reason for admission: Hip Pain (Pt. presents from home after fall. Per family report, pt. was reaching for walker while she fell. EMS personnel state that patient has shortening and rotation of the left leg. Pt. awake and alert. )       Hospital Day: 1  Post Op Day: * No surgery date entered *     Left hip closed unstable intertrochanteric femur fracture. Pt has a hx of dementia and left LE drop foot due to previous back surgery.    Vitals:    06/07/17 2127 06/07/17 2248 06/08/17 0400 06/08/17 0736   BP: (!) 142/64 (!) 140/64  138/61   Pulse: 92 95  91   Resp:  18  18   Temp:  99.8 °F (37.7 °C)  98.3 °F (36.8 °C)   TempSrc:    Oral   SpO2: (!) 94% 96%  98%   Weight:  33.8 kg (74 lb 9 oz) 56.9 kg (125 lb 6 oz)    Height:  5' 5.5" (1.664 m)         Vital Signs (Most Recent)  Temp: 98.3 °F (36.8 °C) (06/08/17 0736)  Pulse: 91 (06/08/17 0736)  Resp: 18 (06/08/17 0736)  BP: 138/61 (06/08/17 0736)  SpO2: 98 % (06/08/17 0736)    Vital Signs Range (Last 24H):  Temp:  [98.3 °F (36.8 °C)-99.8 °F (37.7 °C)]   Pulse:  [82-95]   Resp:  [17-18]   BP: (138-164)/(61-71)   SpO2:  [94 %-100 %]       Physical:    Baseline dementia  Pain with any ROM of the left hip  Palpable distal pulse  Weak in dorsiflexion of toes and ankle due to chronic drop foot.    Recent Labs      06/07/17   1845  06/08/17   0357   K  4.3  4.2   CALCIUM  9.6  9.1   WBC  7.17  7.68   HGB  13.8  13.1   HCT  41.2  38.6   PLT  190  162   INR  1.0   --        I/O last 3 completed shifts:  In: 502.5 [I.V.:502.5]  Out: 250 [Urine:250]          Assessment:  Left hip closed unstable intertrochanteric femur fracture               Plan:    Plan for left hip operative fixation today  Consents signed last night. Reviewed again today    Plan:    Proceed with Left hip Intramedullary nailing       Risks and complications were discussed including but not limited to " the risks of anesthetic complications, infection, wound healing complications, bleeding, injury to nerve, vessels, anr or soft tissues. Need to repeat or perform future operations, instability, limb length inequality, rotational discrepancy, non-union, mal-union,  neurologic dysfunction including numbness, DVT, pulmonary embolism, myocardial infarction, stroke and death among others were discussed, and the patient elects to proceed.    The patient has no history of blood clot.   The patient is on blood thinners.          Haroldo Mohan MD  [facility]

## 2017-06-08 NOTE — TRANSFER OF CARE
"Anesthesia Transfer of Care Note    Patient: Pearl Woodard    Procedure(s) Performed: Procedure(s) (LRB):  IM NAILING OF FEMUR (Left)    Patient location: PACU    Anesthesia Type: spinal    Transport from OR: Transported from OR on room air with adequate spontaneous ventilation    Post pain: adequate analgesia    Post assessment: no apparent anesthetic complications    Post vital signs: stable    Level of consciousness: awake    Nausea/Vomiting: no nausea/vomiting    Complications: none    Transfer of care protocol was followed      Last vitals:   Visit Vitals  BP (!) 109/59 (BP Location: Left arm, Patient Position: Lying, BP Method: Automatic)   Pulse 83   Temp 36.6 °C (97.9 °F) (Axillary)   Resp 15   Ht 5' 5.5" (1.664 m)   Wt 56.9 kg (125 lb 6 oz)   SpO2 98%   Breastfeeding? No   BMI 20.55 kg/m²     "

## 2017-06-08 NOTE — ANESTHESIA POSTPROCEDURE EVALUATION
"Anesthesia Post Evaluation    Patient: Pearl Woodard    Procedure(s) Performed: Procedure(s) (LRB):  IM NAILING OF FEMUR (Left)    Final Anesthesia Type: spinal  Patient location during evaluation: PACU  Patient participation: Yes- Able to Participate  Level of consciousness: awake and alert and oriented  Post-procedure vital signs: reviewed and stable  Pain management: adequate  Airway patency: patent  PONV status at discharge: No PONV  Anesthetic complications: no      Cardiovascular status: blood pressure returned to baseline and hemodynamically stable  Respiratory status: unassisted, spontaneous ventilation and room air  Hydration status: euvolemic  Follow-up not needed.        Visit Vitals  /61   Pulse 70   Temp 36.6 °C (97.9 °F) (Axillary)   Resp 14   Ht 5' 5.5" (1.664 m)   Wt 56.9 kg (125 lb 6 oz)   SpO2 98%   Breastfeeding? No   BMI 20.55 kg/m²       Pain/Petr Score: Pain Assessment Performed: Yes (6/8/2017  1:38 PM)  Presence of Pain: denies (6/8/2017  1:38 PM)  Pain Rating Prior to Med Admin: 8 (6/8/2017  3:28 PM)  Pain Rating Post Med Admin: 2 (6/8/2017 10:00 AM)  Petr Score: 8 (6/8/2017  1:38 PM)      "

## 2017-06-08 NOTE — NURSING
Admit note  Arrived via stretcher per transport, accompanied by spouse. Alert, but forgetful, started ns@75 to right ac. No report of pain or discomfort. Applied roque/scd's with safety socks and bed alarm on. Reinforced instructions to remain npo for surgery in am. Admit packet given to patient at bedside, will continue to monitor for changes.

## 2017-06-08 NOTE — ASSESSMENT & PLAN NOTE
Patient's blood pressure is well-controlled; will continue home regimen of amlodipine, and provide as-needed clonidine.

## 2017-06-08 NOTE — OP NOTE
06/08/2017    PREOPERATIVE DIAGNOSIS:  Left intertrochanteric hip fracture.    POSTOPERATIVE DIAGNOSIS:  Left intertrochanteric hip fracture.    PROCEDURE:  Left hip IM nailing for intertrochanteric hip fracture. (CPT# 98132)    SURGEON:  Haroldo Mohan M.D.    ASSISTANT:   none.    ANESTHESIA:  spinal.    ESTIMATED BLOOD LOSS:  150 mL    INDICATIONS:  The patient is a 76 y.o. who fell previously.  Clinical evaluation was consistent with hip pathology and radiographs revealed an intertrochanteric hip fracture.  She was admitted to the hospital where preoperative medical clearance was obtained and it was recommended at this time to proceed with intramedullary nailing.  Risks and complications were discussed including, but not limited to risks of anesthetic complications, infections, wound healing complications, nonunion, malunion, hardware failure, pain, stiffness, DVT, pulmonary embolism and death among others and she elected to proceed.    DESCRIPTION OF PROCEDURE:  The patient was taken Operating Room where anesthesia was administered by the Anesthesia Department.  She was then placed in the fracture table and all superficial neurovascular structures were well padded.  The left lower extremity was then sterilely prepped and draped in the normal fashion after fluroscopy was used to confirm adequate reduction.   .    A 4 cm longitudinal incision was made just proximal to the greater trochanter.  The subcutaneous tissue and gluteal fascia were incised.  A threaded guide pin was then placed in the tip of the greater trochanter and extended and introduced into the proximal femur under AP and lateral fluoroscopy.  The proximal reamer was then used to ream proximally. A ball-tipped guide gely was then placed through the entry site down to the physeal scar of the femur.  This was measured at 360 mm and then was reamed with a 9-12.5 mm reamer.  A [place] long Gamma3 size 360 mm x 11 mm was then passed over the guidewire,  which was subsequently removed. Traction on the operative leg was released and the rotation was checked both at the knee and the hip to prevent any rotational or limb length discrepancy. The proximal interlocking device was then placed and a 3-cm longitudinal incision was made over the lateral aspect of the proximal thigh and the fascia jose luis was incised.  A threaded guide pin was then placed through the lateral cortex of the femur through the femoral neck and into the femoral head and measured at 100 mm and over reamed.  A 100 mm proximal screw was then placed under fluoroscopy and satisfactory position was noted on AP and lateral fluoroscopy and the setscrew was then set. The proximal interlocking device was then removed.    Distal interlocking was done with a single lateral to medial interlocking screw through a 1 cm incision under fluoroscopic guidance.  All wounds were then thoroughly irrigated.  Subcutaneous tissues were closed with interrupted inverted of #3-0 Vicryl.  Skin was approximated using skin staples.  Sterile dressing was applied.  Anesthesia was reversed and she was returned to the Postanesthesia Care Unit in stable condition.    POST OPERATIVE PLAN  Pt will be WBAT to LLE.     POST OP EXAM    Pt examined in PACU and breathing without labor  Palpable;e distal pulses  Pt comfortable  Spinal still in effect

## 2017-06-08 NOTE — BRIEF OP NOTE
[facility]  Brief Operative Note    SUMMARY     Surgery Date: 6/8/2017     Surgeon(s) and Role:     * Haroldo Mohan MD - Primary    Assisting Surgeon: None    Pre-op Diagnosis:  Hip fracture, left, closed, initial encounter [S72.002A]    Post-op Diagnosis:  Post-Op Diagnosis Codes:     * Hip fracture, left, closed, initial encounter [S72.002A]    Procedure(s) (LRB):  IM NAILING OF FEMUR (Left)    Anesthesia: Spinal    Description of Procedure: left hip IMN    Description of the findings of the procedure: poor bone quality left hip    Estimated Blood Loss: 150 cc         Specimens:   Specimen (12h ago through future)    None

## 2017-06-08 NOTE — PLAN OF CARE
Problem: Fall Risk (Adult)  Goal: Absence of Falls  Patient will demonstrate the desired outcomes by discharge/transition of care.   Outcome: Ongoing (interventions implemented as appropriate)   06/08/17 0312   Fall Risk (Adult)   Absence of Falls making progress toward outcome   Located close to nursing station with bed alarm on.    Problem: Patient Care Overview  Goal: Plan of Care Review  Outcome: Ongoing (interventions implemented as appropriate)   06/08/17 0312   Coping/Psychosocial   Plan Of Care Reviewed With patient   Resting quietly at intervals, continues with iv fluids and npo status as ordered. Medicated for pain x1 during night, incontinent of urine.    Problem: Pressure Ulcer Risk (Vinnie Scale) (Adult,Obstetrics,Pediatric)  Goal: Skin Integrity  Patient will demonstrate the desired outcomes by discharge/transition of care.   Outcome: Ongoing (interventions implemented as appropriate)   06/08/17 0312   Pressure Ulcer Risk (Vinnie Scale) (Adult,Obstetrics,Pediatric)   Skin Integrity making progress toward outcome   Bruising to left elbow and forearm from fall.    Problem: Fractured Hip (Adult)  Goal: Signs and Symptoms of Listed Potential Problems Will be Absent, Minimized or Managed (Fractured Hip)  Signs and symptoms of listed potential problems will be absent, minimized or managed by discharge/transition of care (reference Fractured Hip (Adult) CPG).  Outcome: Ongoing (interventions implemented as appropriate)   06/08/17 0312   Fractured Hip   Problems Assessed (Fractured Hip) functional deficit/self-care deficit   Left hip fracture with pain, no bruising at site.

## 2017-06-08 NOTE — ED NOTES
Received report from Damir MG. 1st contact with pt. Pt AAO x 3, REU, skin warm, bruising and lacerations on left elbow from fall. Side rails up x 2, bed in low position, wheels locked. Call bell within reach. Pt denies pain rated 0/10. Pt has been updated with care. Will continue to monitor

## 2017-06-08 NOTE — ANESTHESIA PROCEDURE NOTES
Spinal    Diagnosis: arthropathy  Patient location during procedure: holding area  Start time: 6/8/2017 11:57 AM  Timeout: 6/8/2017 11:57 AM  End time: 6/8/2017 11:59 AM  Staffing  Anesthesiologist: BETTYE SANCHEZ  Performed: anesthesiologist   Preanesthetic Checklist  Completed: patient identified, site marked, surgical consent, pre-op evaluation, timeout performed, IV checked, risks and benefits discussed and monitors and equipment checked  Spinal Block  Patient position: left lateral decubitus  Prep: ChloraPrep  Patient monitoring: heart rate, cardiac monitor and continuous pulse ox  Approach: left paramedian  Location: L4-5  Injection technique: single shot  CSF Fluid: clear free-flowing CSF  Needle  Needle type: pencil-tip   Needle gauge: 25 G  Needle length: 3.5 in  Additional Documentation: no paresthesia on injection and incremental injection  Needle localization: anatomical landmarks  Assessment  Ease of block: easy  Patient's tolerance of the procedure: comfortable throughout block  Medications:  Bolus administered: 1.6 mL of 0.75 bupivacaine

## 2017-06-08 NOTE — CARE UPDATE
Reviewed H and P by my colleague.  Agree with A and P. Patient s/p mechanical fall with L hip fracture. Evidently going for surgery today. Labs reviewed.

## 2017-06-08 NOTE — PLAN OF CARE
06/08/17 1110   Discharge Assessment   Assessment Type Discharge Planning Assessment  (attempted to meet with pt/family to complete d/c assessment , unable to do so at this time as pt is off unit to surgery at this time.)

## 2017-06-08 NOTE — PROGRESS NOTES
Pt arrived to unit via bed.  IV fluids infusing as ordered.  Lab at bedside.  Non-verbal pain indicators absent.  Pt arouses to voice, spoke to son and called by name.    Family at bedside.

## 2017-06-08 NOTE — HPI
Mrs. Pearl Woodard is a 76 y.o. female with essential hypertension, hypothyroidism (TSH unknown), rheumatoid arthritis, depression, and dementia who presents to [facility] ED with complaints of a fall today.  She was in her bathroom and was reaching for her walker tonight when she slipped and fell.  She cannot contribute meaningfully to her interview only to say that she is here for a fall and that she is nervous.  Further history is limited at this time.

## 2017-06-09 PROBLEM — D62 ACUTE BLOOD LOSS ANEMIA: Status: ACTIVE | Noted: 2017-06-09

## 2017-06-09 PROBLEM — E44.0 MALNUTRITION OF MODERATE DEGREE: Status: ACTIVE | Noted: 2017-06-09

## 2017-06-09 LAB
ALBUMIN SERPL BCP-MCNC: 2.8 G/DL
ALP SERPL-CCNC: 54 U/L
ALT SERPL W/O P-5'-P-CCNC: 40 U/L
ANION GAP SERPL CALC-SCNC: 7 MMOL/L
ANION GAP SERPL CALC-SCNC: 7 MMOL/L
AST SERPL-CCNC: 41 U/L
BACTERIA UR CULT: NORMAL
BASOPHILS # BLD AUTO: 0.08 K/UL
BASOPHILS NFR BLD: 0.8 %
BILIRUB SERPL-MCNC: 0.7 MG/DL
BUN SERPL-MCNC: 19 MG/DL
BUN SERPL-MCNC: 19 MG/DL
CALCIUM SERPL-MCNC: 8.3 MG/DL
CALCIUM SERPL-MCNC: 8.3 MG/DL
CHLORIDE SERPL-SCNC: 105 MMOL/L
CHLORIDE SERPL-SCNC: 105 MMOL/L
CO2 SERPL-SCNC: 22 MMOL/L
CO2 SERPL-SCNC: 22 MMOL/L
CREAT SERPL-MCNC: 0.7 MG/DL
CREAT SERPL-MCNC: 0.7 MG/DL
DIFFERENTIAL METHOD: ABNORMAL
EOSINOPHIL # BLD AUTO: 0.1 K/UL
EOSINOPHIL NFR BLD: 1.1 %
ERYTHROCYTE [DISTWIDTH] IN BLOOD BY AUTOMATED COUNT: 14.1 %
EST. GFR  (AFRICAN AMERICAN): >60 ML/MIN/1.73 M^2
EST. GFR  (AFRICAN AMERICAN): >60 ML/MIN/1.73 M^2
EST. GFR  (NON AFRICAN AMERICAN): >60 ML/MIN/1.73 M^2
EST. GFR  (NON AFRICAN AMERICAN): >60 ML/MIN/1.73 M^2
GLUCOSE SERPL-MCNC: 110 MG/DL
GLUCOSE SERPL-MCNC: 110 MG/DL
HCT VFR BLD AUTO: 29.4 %
HCT VFR BLD AUTO: 29.4 %
HGB BLD-MCNC: 9.5 G/DL
HGB BLD-MCNC: 9.5 G/DL
LYMPHOCYTES # BLD AUTO: 1.4 K/UL
LYMPHOCYTES NFR BLD: 14.5 %
MCH RBC QN AUTO: 33.5 PG
MCHC RBC AUTO-ENTMCNC: 32.3 %
MCV RBC AUTO: 104 FL
MONOCYTES # BLD AUTO: 0.8 K/UL
MONOCYTES NFR BLD: 8.3 %
NEUTROPHILS # BLD AUTO: 7.5 K/UL
NEUTROPHILS NFR BLD: 75.1 %
PLATELET # BLD AUTO: 136 K/UL
PMV BLD AUTO: 10.2 FL
POTASSIUM SERPL-SCNC: 4.2 MMOL/L
POTASSIUM SERPL-SCNC: 4.2 MMOL/L
PROT SERPL-MCNC: 5.3 G/DL
RBC # BLD AUTO: 2.84 M/UL
SODIUM SERPL-SCNC: 134 MMOL/L
SODIUM SERPL-SCNC: 134 MMOL/L
WBC # BLD AUTO: 9.95 K/UL

## 2017-06-09 PROCEDURE — G8988 SELF CARE GOAL STATUS: HCPCS | Mod: CJ | Performed by: SPECIALIST

## 2017-06-09 PROCEDURE — 63600175 PHARM REV CODE 636 W HCPCS: Performed by: ORTHOPAEDIC SURGERY

## 2017-06-09 PROCEDURE — 12000002 HC ACUTE/MED SURGE SEMI-PRIVATE ROOM

## 2017-06-09 PROCEDURE — 97110 THERAPEUTIC EXERCISES: CPT

## 2017-06-09 PROCEDURE — 36415 COLL VENOUS BLD VENIPUNCTURE: CPT

## 2017-06-09 PROCEDURE — G8987 SELF CARE CURRENT STATUS: HCPCS | Mod: CL | Performed by: SPECIALIST

## 2017-06-09 PROCEDURE — 63600175 PHARM REV CODE 636 W HCPCS: Performed by: INTERNAL MEDICINE

## 2017-06-09 PROCEDURE — 97535 SELF CARE MNGMENT TRAINING: CPT | Performed by: SPECIALIST

## 2017-06-09 PROCEDURE — 25000003 PHARM REV CODE 250: Performed by: INTERNAL MEDICINE

## 2017-06-09 PROCEDURE — 85025 COMPLETE CBC W/AUTO DIFF WBC: CPT

## 2017-06-09 PROCEDURE — 97162 PT EVAL MOD COMPLEX 30 MIN: CPT

## 2017-06-09 PROCEDURE — G8979 MOBILITY GOAL STATUS: HCPCS | Mod: CJ

## 2017-06-09 PROCEDURE — 80053 COMPREHEN METABOLIC PANEL: CPT

## 2017-06-09 PROCEDURE — 97165 OT EVAL LOW COMPLEX 30 MIN: CPT | Performed by: SPECIALIST

## 2017-06-09 PROCEDURE — 86580 TB INTRADERMAL TEST: CPT | Performed by: INTERNAL MEDICINE

## 2017-06-09 PROCEDURE — 97116 GAIT TRAINING THERAPY: CPT

## 2017-06-09 PROCEDURE — G8978 MOBILITY CURRENT STATUS: HCPCS | Mod: CL

## 2017-06-09 RX ADMIN — Medication 5 UNITS: at 12:06

## 2017-06-09 RX ADMIN — FOLIC ACID 1 MG: 1 TABLET ORAL at 08:06

## 2017-06-09 RX ADMIN — CEFAZOLIN SODIUM 2 G: 2 SOLUTION INTRAVENOUS at 04:06

## 2017-06-09 RX ADMIN — ASPIRIN 81 MG: 81 TABLET, COATED ORAL at 08:06

## 2017-06-09 RX ADMIN — LEVOTHYROXINE SODIUM 25 MCG: 25 TABLET ORAL at 06:06

## 2017-06-09 RX ADMIN — SERTRALINE HYDROCHLORIDE 25 MG: 25 TABLET ORAL at 08:06

## 2017-06-09 RX ADMIN — HYDROCODONE BITARTRATE AND ACETAMINOPHEN 1 TABLET: 5; 325 TABLET ORAL at 10:06

## 2017-06-09 RX ADMIN — HYDROCODONE BITARTRATE AND ACETAMINOPHEN 1 TABLET: 5; 325 TABLET ORAL at 09:06

## 2017-06-09 RX ADMIN — MEMANTINE HYDROCHLORIDE 28 MG: 28 CAPSULE, EXTENDED RELEASE ORAL at 09:06

## 2017-06-09 RX ADMIN — AMLODIPINE BESYLATE 5 MG: 5 TABLET ORAL at 08:06

## 2017-06-09 NOTE — PROGRESS NOTES
[facility] Hospital Medicine  Progress Note    Patient Name: Pearl Woodard  MRN: [id number]  Patient Class: IP- Inpatient   Admission Date: 6/7/2017  Length of Stay: 2 days  Attending Physician: Matt Pacheco MD  Primary Care Provider: Jarad Daniels MD        Subjective:     Principal Problem:Closed left hip fracture    HPI:  Mrs. Pearl Woodard is a 76 y.o. female with essential hypertension, hypothyroidism (TSH unknown), rheumatoid arthritis, depression, and dementia who presents to [facility] ED with complaints of a fall today.  She was in her bathroom and was reaching for her walker tonight when she slipped and fell.  She cannot contribute meaningfully to her interview only to say that she is here for a fall and that she is nervous.  Further history is limited at this time.    Hospital Course:  Patient admitted to the hospital for eval and treatment of L hip fracture after mechanical fall.  Ortho was consulted and the patient underwent repair on 6/8.  was consulted for SNF placement.  PT/OT were consulted.     Interval History: Doing well. No complaints. Pain controlled.     Review of Systems   Constitutional: Negative for activity change.   Respiratory: Negative for chest tightness and shortness of breath.    Cardiovascular: Negative for chest pain.   Gastrointestinal: Negative for abdominal pain.     Objective:     Vital Signs (Most Recent):  Temp: 97.9 °F (36.6 °C) (06/09/17 0805)  Pulse: 92 (06/09/17 0805)  Resp: 17 (06/09/17 0805)  BP: (!) 142/71 (06/09/17 0805)  SpO2: (!) 92 % (06/09/17 0805) Vital Signs (24h Range):  Temp:  [97.3 °F (36.3 °C)-98.8 °F (37.1 °C)] 97.9 °F (36.6 °C)  Pulse:  [] 92  Resp:  [11-22] 17  SpO2:  [92 %-100 %] 92 %  BP: (109-175)/(55-83) 142/71     Weight: 56.8 kg (125 lb 3.5 oz)  Body mass index is 20.52 kg/m².    Intake/Output Summary (Last 24 hours) at 06/09/17 0934  Last data filed at 06/09/17 0700   Gross per 24 hour    Intake           2982.5 ml   Output              625 ml   Net           2357.5 ml      Physical Exam   Constitutional: She is oriented to person, place, and time. She appears well-developed and well-nourished.   HENT:   Head: Normocephalic and atraumatic.   Cardiovascular: Normal rate.    Pulmonary/Chest: Effort normal.   Abdominal: Soft.   Neurological: She is alert and oriented to person, place, and time.       Significant Labs:   BMP:   Recent Labs  Lab 06/09/17  0331     110   *  134*   K 4.2  4.2     105   CO2 22*  22*   BUN 19  19   CREATININE 0.7  0.7   CALCIUM 8.3*  8.3*     CBC:   Recent Labs  Lab 06/07/17  1845 06/08/17  0357 06/08/17  1644 06/09/17  0331   WBC 7.17 7.68  --  9.95   HGB 13.8 13.1 11.5* 9.5*  9.5*   HCT 41.2 38.6 35.0* 29.4*  29.4*    162  --  136*       Magnesium:  No results for input(s): MG in the last 48 hours.    Significant Imaging:    Assessment/Plan:      * Closed left hip fracture    S/p repair on 6/8.  Doing well. PT/OT eval today.  Likely will need SNF.  PPD placed 6/9        Malnutrition of moderate degree    Will discuss with patient           Acute blood loss anemia    As expected from surgery.           Dementia without behavioral disturbance    Stable; will continue her home regimen of memantine.        Depression    Stable; will continue her home regimen of sertraline.        Essential hypertension    Patient's blood pressure is well-controlled; will continue home regimen of amlodipine, and provide as-needed clonidine.        Hypothyroidism    Will continue her home regimen of levothyroxine.        Rheumatoid arthritis    Stable; will continue her home regimen of methotrexate.          VTE Risk Mitigation         Ordered     enoxaparin injection 40 mg  Every 24 hours (non-standard times)     Route:  Subcutaneous        06/08/17 1642     Medium Risk of VTE  Once      06/08/17 1642      PT/OT eval. Likely SNF on Monday or Tuesday.  PPD  placed today.       Matt Fagan MD  Department of Hospital Medicine   [facility]

## 2017-06-09 NOTE — PT/OT/SLP PROGRESS
Physical Therapy  PM Treatment    Pearl Woodard   MRN: [id number]   Admitting Diagnosis: Closed left hip fracture    PT Received On: 06/09/17  PT Start Time: 1404     PT Stop Time: 1442   PT Total Time (min): 38 min       Billable Minutes:  Gait [id number] Therapeutic Exercise 8    Treatment Type: Treatment  PT/PTA: PT     PTA Visit Number: 0       General Precautions: Standard, fall  Orthopedic Precautions: LLE weight bearing as tolerated   Braces: L AFO    Subjective:  Pt reports no pain and that she keeps breaking out into a cold sweat.     Pain/Comfort  Pain Rating 1: 0/10  Location - Side 1: Left  Location 1: hip  Pain Addressed 1: Pre-medicate for activity     Objective:   Patient found with: peripheral IV    Functional Mobility:  Bed Mobility:   Sit to Supine: Moderate Assistance, With leg lift, With assist of  2    Transfers:  Sit <> Stand Assistance: Minimum Assistance  Sit <> Stand Assistive Device: Rolling Walker  Bed <> Chair Technique: Stand Pivot  Bed <> Chair Assistance: Moderate Assistance  Bed <> Chair Assistive Device: No Assistive Device    Gait:   Gait Distance: 15ft with seated break then 25ft  (Pt required constant verbal cues to continue ambulating and for hand placement on rolling walker. Pt required manual assistance to advance rolling walker and to advance BLE.)  Assistance 1: Minimum assistance  Gait Assistive Device: Rolling walker and L AFO  Gait Pattern: 3-point gait (step to gait)  Gait Deviation(s): decreased caroline, increased time in double stance, decreased velocity of limb motion, decreased step length, decreased toe-to-floor clearance    Balance:   Static Sit: FAIR-: Maintains without assist but inconsistent   Static Stand: FAIR: Maintains without assist but unable to take challenges  Dynamic stand: POOR: Needs Minimum Assistance during gait.     Therapeutic Activities and Exercises:  Pt performed the following exercises supine BLE x10.   Ankle DF/PF (only on RLE secondary  to history of L foot drop)   Quad sets   Glute sets   Straight leg raises (active assistance with LLE)  Hip Adduction/Abduction (active assistance with LLE)       AM-PAC 6 CLICK MOBILITY  How much help from another person does this patient currently need?   1 = Unable, Total/Dependent Assistance  2 = A lot, Maximum/Moderate Assistance  3 = A little, Minimum/Contact Guard/Supervision  4 = None, Modified Luce/Independent    Turning over in bed (including adjusting bedclothes, sheets and blankets)?: 2  Sitting down on and standing up from a chair with arms (e.g., wheelchair, bedside commode, etc.): 3  Moving from lying on back to sitting on the side of the bed?: 2  Moving to and from a bed to a chair (including a wheelchair)?: 2  Need to walk in hospital room?: 3  Climbing 3-5 steps with a railing?: 1  Total Score: 13    AM-PAC Raw Score CMS G-Code Modifier Level of Impairment Assistance   6 % Total / Unable   7 - 9 CM 80 - 100% Maximal Assist   10 - 14 CL 60 - 80% Moderate Assist   15 - 19 CK 40 - 60% Moderate Assist   20 - 22 CJ 20 - 40% Minimal Assist   23 CI 1-20% SBA / CGA   24 CH 0% Independent/ Mod I     Patient left supine with bilateral heels offloaded with all lines intact, call button in reach, nurse Chelsea notified and spouse present.    Assessment:  Pearl Woodard is a 76 y.o. female with a medical diagnosis of Closed left hip fracture and presents one day s/p L hip IMN. Pt with delayed response to commands and required constant verbal cues for gait training. Pt ambulated 40ft in hallway with minimum assistance using a rolling walker. Pt would continue to benefit from skilled PT services BID while in the hospital.     Rehab identified problem list/impairments: Rehab identified problem list/impairments: weakness, impaired balance, gait instability, impaired functional mobilty, decreased lower extremity function, impaired cognition, decreased coordination, pain, decreased safety  awareness, edema, decreased ROM, impaired coordination, impaired skin (Surgical dressing on L hip starting to fall off. )    Rehab potential is fair+    Activity tolerance: fair-    Discharge recommendations: Discharge Facility/Level Of Care Needs:  (TBD)     Barriers to discharge: Barriers to Discharge:  (Dementia)    Equipment recommendations: Equipment Needed After Discharge: walker, rolling, wheelchair     GOALS:    Physical Therapy Goals        Problem: Physical Therapy Goal    Goal Priority Disciplines Outcome Goal Variances Interventions   Physical Therapy Goal     PT/OT, PT      Description:  Goals to be met by: 2017    Patient will increase functional independence with mobility by performin. Supine to sit with Stand-by Assistance  2. Sit to stand transfer with Supervision  3. Gait  x 100 feet with Stand-by Assistance using Rolling Walker.   4. Lower extremity exercise program x20 reps per handout, with supervision  5. Bed to chair transfer with Stand-by Assistance using Rolling Walker.                        PLAN:    Patient to be seen BID  to address the above listed problems via gait training, therapeutic activities, therapeutic exercises  Plan of Care expires: 17  Plan of Care reviewed with: patient, spouse         TITO Santos  2017

## 2017-06-09 NOTE — SUBJECTIVE & OBJECTIVE
Interval History: Doing well. No complaints. Pain controlled.     Review of Systems   Constitutional: Negative for activity change.   Respiratory: Negative for chest tightness and shortness of breath.    Cardiovascular: Negative for chest pain.   Gastrointestinal: Negative for abdominal pain.     Objective:     Vital Signs (Most Recent):  Temp: 97.9 °F (36.6 °C) (06/09/17 0805)  Pulse: 92 (06/09/17 0805)  Resp: 17 (06/09/17 0805)  BP: (!) 142/71 (06/09/17 0805)  SpO2: (!) 92 % (06/09/17 0805) Vital Signs (24h Range):  Temp:  [97.3 °F (36.3 °C)-98.8 °F (37.1 °C)] 97.9 °F (36.6 °C)  Pulse:  [] 92  Resp:  [11-22] 17  SpO2:  [92 %-100 %] 92 %  BP: (109-175)/(55-83) 142/71     Weight: 56.8 kg (125 lb 3.5 oz)  Body mass index is 20.52 kg/m².    Intake/Output Summary (Last 24 hours) at 06/09/17 0934  Last data filed at 06/09/17 0700   Gross per 24 hour   Intake           2982.5 ml   Output              625 ml   Net           2357.5 ml      Physical Exam   Constitutional: She is oriented to person, place, and time. She appears well-developed and well-nourished.   HENT:   Head: Normocephalic and atraumatic.   Cardiovascular: Normal rate.    Pulmonary/Chest: Effort normal.   Abdominal: Soft.   Neurological: She is alert and oriented to person, place, and time.       Significant Labs:   BMP:   Recent Labs  Lab 06/09/17  0331     110   *  134*   K 4.2  4.2     105   CO2 22*  22*   BUN 19  19   CREATININE 0.7  0.7   CALCIUM 8.3*  8.3*     CBC:   Recent Labs  Lab 06/07/17  1845 06/08/17  0357 06/08/17  1644 06/09/17  0331   WBC 7.17 7.68  --  9.95   HGB 13.8 13.1 11.5* 9.5*  9.5*   HCT 41.2 38.6 35.0* 29.4*  29.4*    162  --  136*       Magnesium:  No results for input(s): MG in the last 48 hours.    Significant Imaging:

## 2017-06-09 NOTE — PROGRESS NOTES
Attempted to call in [facility].  Awaiting return call from [facility] staff.  Karrie Akhtar, JAVIER, ACM-SW, CCM    (Family has list of [facility] approved SNFs).

## 2017-06-09 NOTE — PROGRESS NOTES
Ortho Daily Progress Note    Pearl Woodard is a 76 y.o. female admitted on 6/7/2017      Chief Complaint/Reason for admission: Hip Pain (Pt. presents from home after fall. Per family report, pt. was reaching for walker while she fell. EMS personnel state that patient has shortening and rotation of the left leg. Pt. awake and alert. )       Hospital Day: 2  Post Op Day: 1 Day Post-Op     The patient was seen and examined this morning at the bedside. Patient reports no acute issues overnight.  Patient reports that pain is adequately controlled.    _______________    Vitals:    06/08/17 1954 06/09/17 0026 06/09/17 0400 06/09/17 0405   BP: (!) 142/69 118/68  (!) 143/66   Pulse: 107 109  101   Resp: 18 20 18   Temp: 98.6 °F (37 °C) 98.5 °F (36.9 °C)  98.4 °F (36.9 °C)   TempSrc: Oral Oral  Oral   SpO2: (!) 92% (!) 92%  96%   Weight:   56.8 kg (125 lb 3.5 oz)    Height:           Vital Signs (Most Recent)  Temp: 98.4 °F (36.9 °C) (06/09/17 0405)  Pulse: 101 (06/09/17 0405)  Resp: 18 (06/09/17 0405)  BP: (!) 143/66 (06/09/17 0405)  SpO2: 96 % (06/09/17 0405)    Vital Signs Range (Last 24H):  Temp:  [97.3 °F (36.3 °C)-98.8 °F (37.1 °C)]   Pulse:  []   Resp:  [11-22]   BP: (109-175)/(55-83)   SpO2:  [92 %-100 %]       Physical:    Baseline dementia   at bedside with he patient.  Incision/ dressing clean/dry/intact  Chronic left drop foot  palpable distal pulses  CR<3sec      Recent Labs      06/07/17   1845  06/08/17   0357  06/08/17   1644  06/09/17   0331   K  4.3  4.2  3.9  4.2  4.2   CALCIUM  9.6  9.1  8.6*  8.3*  8.3*   WBC  7.17  7.68   --   9.95   HGB  13.8  13.1  11.5*  9.5*  9.5*   HCT  41.2  38.6  35.0*  29.4*  29.4*   PLT  190  162   --   136*   INR  1.0   --    --    --        I/O last 3 completed shifts:  In: 2502.5 [I.V.:2402.5; IV Piggyback:100]  Out: 875 [Urine:875]          Assessment:  A/P POD 1 s/p left    Hip IMN           Plan:    PT/OT: WBAT  PT has an AFO that her   will bring. She has chronic LLE drop foot  Pain Control  DVT Prophylaxis: Lovenox  Acute expected blood loss anemia from surgery    Discharge Plan: Likely to SNF        Haroldo Mohan MD  [facility]

## 2017-06-09 NOTE — PLAN OF CARE
06/09/17 1320   Discharge Assessment   Assessment Type Discharge Planning Assessment   Confirmed/corrected address and phone number on facesheet? Yes   Assessment information obtained from? Patient;Caregiver   Communicated expected length of stay with patient/caregiver no   Type of Healthcare Directive Received Durable power of  for health care   If Healthcare Directive is received, is it scanned into Epic? no (comment)   Prior to hospitilization cognitive status: Alert/Oriented   Prior to hospitalization functional status: Assistive Equipment;Needs Assistance   Current cognitive status: Alert/Oriented   Current Functional Status: Assistive Equipment;Needs Assistance   Arrived From admitted as an inpatient;home or self-care   Lives With spouse   Able to Return to Prior Arrangements yes   Is patient able to care for self after discharge? No   How many people do you have in your home that can help with your care after discharge? 2   Who are your caregiver(s) and their phone number(s)? spouse, Osmany Piedra.  [phone]   Patient's perception of discharge disposition (TBD pending PT/OT Eval)   Readmission Within The Last 30 Days no previous admission in last 30 days   Patient currently being followed by outpatient case management? No   Patient currently receives home health services? No   Does the patient currently use HME? Yes   Patient currently receives private duty nursing? No  (Has sitter  2 days per week.)   Equipment Currently Used at Home walker, rolling;wheelchair  (hastravel wheel chair)   Do you have any problems affording any of your prescribed medications? No   Is the patient taking medications as prescribed? yes   Do you have any financial concerns preventing you from receiving the healthcare you need? No   Does the patient have transportation to healthcare appointments? Yes   Transportation Available family or friend will provide   On Dialysis? No   Does the patient receive  services at the [facility]? No   Are there any open cases? No   Discharge Plan A Skilled Nursing Facility  (PT/OT eval pending.)   Discharge Plan B (TBD)   Patient/Family In Agreement With Plan yes   Patient from home with spouse and needed assistance with ADLs and IADLs.  Has sitter in the home to help with bathing and dressing 2 days per week.  Patient has a walker and travel wheelchair.  Patient's spouse makes decisions; however, if needed, son Ousmane ([phone]) is POA.  Discharge disposition:  SNF v HH?  Karrie Akhtar, JAVIER, ACJOHN-SW, [facility]  06/09/2017

## 2017-06-09 NOTE — PROGRESS NOTES
Patient's post op f/u with Dr. Mohan in the [place] office scheduled for June 20, 2017 at 1:30 p.m.  Information placed on the AVS.  Karrie Akhtar LMSW, ROMAN-CHASITY, [facility]  06/09/2017

## 2017-06-09 NOTE — PT/OT/SLP EVAL
Physical Therapy  Evaluation    Pearl Woodard   MRN: [id number]   Admitting Diagnosis: Closed left hip fracture    PT Received On: 06/09/17  PT Start Time: 1018     PT Stop Time: 1056    PT Total Time (min): 38 min       Billable Minutes:  Evaluation 19, Gait [email or web address]   Diagnosis: Closed left hip fracture  L hip IMN 6/8/2017    Past Medical History:   Diagnosis Date    Asthma     Carotid stenosis 10/7/2013    10/3/2013: Carotid Duplex: EVERT: mild. RECA: 2.0 m/s. LICA: mild    History of syncope 4/16/2015 4/13/2015: Syncope.    HTN (hypertension), malignant 3/14/2013    Hypercholesteremia 3/14/2013    Hypertension     Hypothyroidism 4/16/2015 4/13/2015: Syncope.    Rheumatoid arthritis 3/14/2013    Dr Grimes follows.    Right bundle branch block 2/6/2015 2/6/2015: Right bundle branch block noted.    Thyroid disease     Hypothyroid      Past Surgical History:   Procedure Laterality Date    BACK SURGERY      KNEE SURGERY  12/2012    After fall       Referring physician: Kimberlee  Date referred to PT: 6/8/2017    General Precautions: Standard, fall  Orthopedic Precautions: LLE weight bearing as tolerated   Braces:  (Pt owns AFO for L footdrop  to bring before afternoon treatment. )        Patient History:  Lives With: spouse  Living Arrangements: house  Home Accessibility:  (One step to enter home)  Home Layout: Able to live on 1st floor  Transportation Available: family or friend will provide  Living Environment Comment: Pt has nurse's aid that comes to house a couple times a week .   Equipment Currently Used at Home: rollator, shower chair  DME owned (not currently used): single point cane    Previous Level of Function:  Ambulation Skills: needs device  Transfer Skills: needs device    Subjective:    Patient goals: To return to PLOF     Pain/Comfort  Pain Rating 1: 0/10  Location - Side 1: Left  Location 1: hip  Pain Addressed 1: Pre-medicate for activity      Objective:    Patient found with: peripheral IV     Vitals:  BP seated: 128/62   BP upon standin/54  BP seated post intervention: 118/58     Cognitive Exam:  Oriented to: Person and Place    Follows Commands/attention: Easily distracted and Follows one-step commands  Communication: clear/fluent  Safety awareness/insight to disability: impaired    Physical Exam:  Postural examination/scapula alignment: Rounded shoulder, Head forward and increased trunk flexion     Skin integrity: Bruising of dorsum of L foot  from previous fall not related to current injury.   Edema: Mild L LLE    Sensation:   Attempted light touch, but cannot rely on results secondary to pt altered mental status    Lower Extremity Range of Motion:  Right Lower Extremity: WFL  Left Lower Extremity: WFL except ankle DF/PF secondary to history of footdrop     Lower Extremity Strength:  Right Lower Extremity: WFL  Left Lower Extremity: Deficits: WFL except ankle DF/PF     Functional Mobility:  Bed Mobility:  Rolling/Turning Right: Moderate assistance  Scooting/Bridging: Minimum Assistance  Supine to Sit: Moderate Assistance    Transfers:  Sit <> Stand Assistance: Minimum Assistance  Sit <> Stand Assistive Device: Rolling Walker    Gait:   Gait Distance: 5ftx2 With seated break. (Pt required verbal cues to advance BLE, to increase trunk extension to stand upright, and for hand placement on rollling walker. Pt required manual cues to advance LLE. )  Assistance 1: Minimum assistance  Gait Assistive Device: Rolling walker  Gait Pattern: 3-point gait (step to gait)  Gait Deviation(s): decreased caroline, increased time in double stance, decreased velocity of limb motion, decreased step length, toe drag, decreased toe-to-floor clearance, forward lean    Balance:   Static Sit: FAIR-: Maintains without assist but inconsistent   Static Stand: FAIR: Maintains without assist but unable to take challenges  Dynamic stand: POOR: Needs Minimum Assistance during gait.      Therapeutic Activities and Exercises:  Pt performed the following exercises BLE x10. (pt required constant verbal cues to continue performing exercises and for correct technique.)     LAQ   Heel slides   Hip flexion with active assistance for LLE     Pt performed ankle DF/PF RLE. Unable to perform on LLE secondary to footdrop.     Attempted Glute sets, but pt distracted and did not participate in exercise.     AM-PAC 6 CLICK MOBILITY  How much help from another person does this patient currently need?   1 = Unable, Total/Dependent Assistance  2 = A lot, Maximum/Moderate Assistance  3 = A little, Minimum/Contact Guard/Supervision  4 = None, Modified [place]/Independent    Turning over in bed (including adjusting bedclothes, sheets and blankets)?: 2  Sitting down on and standing up from a chair with arms (e.g., wheelchair, bedside commode, etc.): 3  Moving from lying on back to sitting on the side of the bed?: 2  Moving to and from a bed to a chair (including a wheelchair)?: 2  Need to walk in hospital room?: 2  Climbing 3-5 steps with a railing?: 1  Total Score: 12     AM-PAC Raw Score CMS G-Code Modifier Level of Impairment Assistance   6 % Total / Unable   7 - 9 CM 80 - 100% Maximal Assist   10 - 14 CL 60 - 80% Moderate Assist   15 - 19 CK 40 - 60% Moderate Assist   20 - 22 CJ 20 - 40% Minimal Assist   23 CI 1-20% SBA / CGA   24 CH 0% Independent/ Mod I     Patient left up in chair on air cushion reclined with legs elevated with all lines intact, call button in reach, nurse Chelsea notified and spouse present.    Assessment:   Pearl Woodard is a 76 y.o. female with a medical diagnosis of Closed left hip fracture and presents one day s/p L hip IMN. Pt with difficulty following PT instructions. Pt's spouse provided most of information on living environment and equipment used at home. Pt required constant verbal cues to perform bed mobility, transfers, gait training, and exercises. After  first attempt at ambulating pt presented with signs of hypotension and was assisted to sit in chair. BP readings confirmed orthostatic hypotension. Pt able to ambulate  5ft x 2 in room with minimum assistance using a rolling walker. Pt would continue to benefit from skilled PT services BID while in the hospital.     Rehab identified problem list/impairments: Rehab identified problem list/impairments: weakness, impaired endurance, impaired self care skills, impaired functional mobilty, gait instability, impaired balance, impaired cognition, decreased coordination, decreased lower extremity function, pain, decreased safety awareness, decreased ROM, edema, impaired skin, impaired cardiopulmonary response to activity, orthopedic precautions (Orthostatic hypotension. Surgical dressing on L hip coming up. )    Rehab potential is fair +    Activity tolerance: Poor    Discharge recommendations: Discharge Facility/Level Of Care Needs: TBD     Barriers to discharge: Barriers to Discharge:  (Dementia )    Equipment recommendations: Equipment Needed After Discharge: walker, rolling, wheelchair     GOALS:    Physical Therapy Goals        Problem: Physical Therapy Goal    Goal Priority Disciplines Outcome Goal Variances Interventions   Physical Therapy Goal     PT/OT, PT      Description:  Goals to be met by: 2017    Patient will increase functional independence with mobility by performin. Supine to sit with Stand-by Assistance  2. Sit to stand transfer with Supervision  3. Gait  x 100 feet with Stand-by Assistance using Rolling Walker.   4. Lower extremity exercise program x20 reps per handout, with supervision                      PLAN:    Patient to be seen BID to address the above listed problems via gait training, therapeutic activities, therapeutic exercises  Plan of Care expires: 17  Plan of Care reviewed with: patient, spouse          TITO Santos  2017

## 2017-06-09 NOTE — PLAN OF CARE
Problem: Physical Therapy Goal  Goal: Physical Therapy Goal  Goals to be met by: 2017    Patient will increase functional independence with mobility by performin. Supine to sit with Stand-by Assistance  2. Sit to stand transfer with Supervision  3. Gait  x 100 feet with Stand-by Assistance using Rolling Walker.   4. Lower extremity exercise program x20 reps per handout, with supervision  5. Bed to chair transfer with Stand-by Assistance using Rolling Walker.      Outcome: Ongoing (interventions implemented as appropriate)  Pt progressed this afternoon with gait distance and exercises. Pt would continue to benefit from PT services BID while in hospital.

## 2017-06-09 NOTE — PT/OT/SLP EVAL
Occupational Therapy  Evaluation    Pearl Woodard   MRN: [id number]   Admitting Diagnosis: Closed left hip fracture    OT Date of Treatment: 06/09/17   OT Start Time: 1006  OT Stop Time: 1043  OT Total Time (min): 37 min    Billable Minutes:  Evaluation 15  Self Care/Home Management 10    Diagnosis: Closed left hip fracture; s/p L LE IM nail      Past Medical History:   Diagnosis Date    Asthma     Carotid stenosis 10/7/2013    10/3/2013: Carotid Duplex: EVERT: mild. RECA: 2.0 m/s. LICA: mild    History of syncope 4/16/2015 4/13/2015: Syncope.    HTN (hypertension), malignant 3/14/2013    Hypercholesteremia 3/14/2013    Hypertension     Hypothyroidism 4/16/2015 4/13/2015: Syncope.    Rheumatoid arthritis 3/14/2013    Dr Grimes follows.    Right bundle branch block 2/6/2015 2/6/2015: Right bundle branch block noted.    Thyroid disease     Hypothyroid      Past Surgical History:   Procedure Laterality Date    BACK SURGERY      KNEE SURGERY  12/2012    After fall       Referring physician: Kimberlee  Date referred to OT: 06/08/17    General Precautions: Standard, fall  Orthopedic Precautions: LLE weight bearing as tolerated  Braces: N/A (Spouse reports pt has AFO for L foot drop PTA, but did not wear consistantly 2* RA flair ups)          Patient History:  Living Environment  Lives With: spouse  Living Arrangements: house  Home Accessibility: stairs within home, stairs to enter home  Home Layout: Able to live on 1st floor  Number of Stairs to Enter Home: 1  Number of Stairs Within Home:  (1 flight)  Equipment Currently Used at Home: shower chair, rollator, grab bar (elevated toilet); spouse reports getting walk in tub installed next week    Prior level of function:   Bed Mobility/Transfers: needs device  Grooming: independent  Bathing: needs device and assist  Upper Body Dressing: needs assist  Lower Body Dressing: needs assist  Toileting: needs device  Home Management Skills: unable to  "perform   Pt lives with spouse with aides 3/wk to assist with bathing.        Dominant hand: right    Subjective:  Communicated with RN (Chelsea) prior to session.    Chief Complaint: "Tired"  Patient/Family stated goals: Pt did not state    Pain/Comfort  Pain Rating 1: 0/10 (Pt did not use pain scale, but reported "a little")  Location - Side 1: Left  Location 1: hip  Pain Addressed 1: Pre-medicate for activity, Reposition, Distraction  Pain Rating Post-Intervention 1:  (Pt did not rate with number; pt reports "a little")  Location - Orientation 2: lower  Location 2: back  Pain Addressed 2: Pre-medicate for activity, Reposition, Distraction  Pain Rating Post-Intervention 2: 0/10    Objective:       Cognitive Exam:  Oriented to: Person and Place  Follows Commands/attention: Easily distracted and Follows one-step commands  Communication: clear/fluent  Memory:  Impaired STM, Impaired LTM and Poor immediate recall  Safety awareness/insight to disability: impaired  Coping skills/emotional control: Appropriate to Situation    Visual/perceptual:  Intact    Physical Exam:  Postural examination/scapula alignment: Rounded shoulder and Kyphosis      Sensation:   Intact    Upper Extremity Range of Motion:  Right Upper Extremity: WFL  Left Upper Extremity: WFL    Upper Extremity Strength:  Right Upper Extremity: 3+/5  Left Upper Extremity: 3+/5      Fine motor coordination:   Intact    Gross motor coordination: WFL    Functional Mobility:  Bed Mobility:  Rolling/Turning Right: Moderate assistance  Supine to Sit: Moderate Assistance    Transfers:  Sit <> Stand Assistance: Moderate Assistance  Sit <> Stand Assistive Device: Rolling Walker  Bed <> Chair Transfer Assistance: Moderate Assistance  Bed <> Chair Assistive Device: Rolling Walker      Activities of Daily Living:       UE Dressing Level of Assistance: Maximum assistance    LE Dressing Level of Assistance: Total assistance    Grooming Position: Seated  Grooming Level of " "Assistance: Supervision  Toileting Where Assessed: Bed level  Toileting Level of Assistance: Total assistance            Balance:   Static Sit: FAIR-: Maintains without assist but inconsistent   Dynamic Sit: FAIR: Cannot move trunk without losing balance  Static Stand: POOR+: Needs MINIMAL assist to maintain  Dynamic stand: POOR: N/A        AM-PAC 6 CLICK ADL  How much help from another person does this patient currently need?  1 = Unable, Total/Dependent Assistance  2 = A lot, Maximum/Moderate Assistance  3 = A little, Minimum/Contact Guard/Supervision  4 = None, Modified [place]/Independent    Putting on and taking off regular lower body clothing? : 1  Bathing (including washing, rinsing, drying)?: 2  Toileting, which includes using toilet, bedpan, or urinal? : 1  Putting on and taking off regular upper body clothing?: 2  Taking care of personal grooming such as brushing teeth?: 3  Eating meals?: 3  Total Score: 12    AM-PAC Raw Score CMS "G-Code Modifier Level of Impairment Assistance   6 % Total / Unable   7 - 9 CM 80 - 100% Maximal Assist   10-14 CL 60 - 80% Moderate Assist   15 - 19 CK 40 - 60% Moderate Assist   20 - 22 CJ 20 - 40% Minimal Assist   23 CI 1-20% SBA / CGA   24 CH 0% Independent/ Mod I       Patient left up in chair with all lines intact and PT present    Assessment:  Pearl Woodard is a 76 y.o. female with a medical diagnosis of Closed left hip fracture; s/p IM nail and presents with deficits in balance, strength, endurance, ROM, cognition, and safety awareness limiting ability to assist with ADLs.    Rehab identified problem list/impairments: Rehab identified problem list/impairments: weakness, impaired endurance, impaired self care skills, impaired functional mobilty, gait instability, impaired balance, impaired cognition, decreased upper extremity function, decreased lower extremity function, decreased safety awareness, pain, decreased ROM, orthopedic precautions    Rehab " potential is good.    Activity tolerance: Good    Discharge recommendations: Discharge Facility/Level Of Care Needs: nursing facility, skilled     Barriers to discharge: Barriers to Discharge:  (assist with ADLs; dementia)    Equipment recommendations: bedside commode, walker, rolling     GOALS:    Occupational Therapy Goals        Problem: Occupational Therapy Goal    Goal Priority Disciplines Outcome Interventions   Occupational Therapy Goal     OT, PT/OT     Description:  Goals to be met by: 06/30/17     Patient will increase functional independence with ADLs by performing:    UE Dressing with Set-up Assistance.  Grooming while standing at sink with Contact Guard Assistance.  Toileting from bedside commode with Minimal Assistance for hygiene and clothing management.   Toilet transfer to bedside commode with Contact Guard Assistance.  Upper extremity exercise program x 10 reps with assistance as needed.                      PLAN:  Patient to be seen 5 x/week to address the above listed problems via self-care/home management, therapeutic exercises, therapeutic activities  Plan of Care expires: 06/30/17  Plan of Care reviewed with: patient, spouse    OT G-codes  Functional Assessment Tool Used: AM-PAC  Score: 12  Functional Limitation: Self care  Self Care Current Status (): CL  Self Care Goal Status (): AJIT M[facility]od OT  06/09/2017

## 2017-06-09 NOTE — PLAN OF CARE
Problem: Occupational Therapy Goal  Goal: Occupational Therapy Goal  Goals to be met by: 06/30/17     Patient will increase functional independence with ADLs by performing:    UE Dressing with Set-up Assistance.  Grooming while standing at sink with Contact Guard Assistance.  Toileting from bedside commode with Minimal Assistance for hygiene and clothing management.   Toilet transfer to bedside commode with Contact Guard Assistance.  Upper extremity exercise program x 10 reps with assistance as needed.     Outcome: Ongoing (interventions implemented as appropriate)  OT evaluation complete.  Pt will benefit from SNF to increase ability to assist with ADLs.

## 2017-06-09 NOTE — HOSPITAL COURSE
Patient admitted to the hospital for eval and treatment of L hip fracture after mechanical fall.  Ortho was consulted and the patient underwent repair on 6/8. No reported complications, other than expected mild blood loss anemia for which she did not become symptomatic. Patient showed great recovery and worked with PT/OT successfully. Treated with lactulose PRN for constipation. No issues with dementia or depression. BP remained well controlled on home regimen. Pain well controlled with as needed tramadol.  was consulted for SNF placement.

## 2017-06-09 NOTE — PLAN OF CARE
Problem: Patient Care Overview  Goal: Plan of Care Review   06/09/17 0553   Coping/Psychosocial   Plan Of Care Reviewed With patient     Pt free from injury this evening. Pt close to nurse's station and bed alarm intact. sm amnt drainage to L hip drsg. Pain controlled with P.O. Medication. IV fluids infusing per MD orders. POC continued.

## 2017-06-10 LAB
ABO + RH BLD: NORMAL
ANION GAP SERPL CALC-SCNC: 5 MMOL/L
BASOPHILS # BLD AUTO: 0.04 K/UL
BASOPHILS NFR BLD: 0.4 %
BLD GP AB SCN CELLS X3 SERPL QL: NORMAL
BLD PROD TYP BPU: NORMAL
BLD PROD TYP BPU: NORMAL
BLOOD UNIT EXPIRATION DATE: NORMAL
BLOOD UNIT EXPIRATION DATE: NORMAL
BLOOD UNIT TYPE CODE: 5100
BLOOD UNIT TYPE CODE: 5100
BLOOD UNIT TYPE: NORMAL
BLOOD UNIT TYPE: NORMAL
BUN SERPL-MCNC: 12 MG/DL
CALCIUM SERPL-MCNC: 8 MG/DL
CHLORIDE SERPL-SCNC: 109 MMOL/L
CO2 SERPL-SCNC: 25 MMOL/L
CODING SYSTEM: NORMAL
CODING SYSTEM: NORMAL
CREAT SERPL-MCNC: 0.5 MG/DL
DIFFERENTIAL METHOD: ABNORMAL
DISPENSE STATUS: NORMAL
DISPENSE STATUS: NORMAL
EOSINOPHIL # BLD AUTO: 0.5 K/UL
EOSINOPHIL NFR BLD: 5.1 %
ERYTHROCYTE [DISTWIDTH] IN BLOOD BY AUTOMATED COUNT: 15.6 %
EST. GFR  (AFRICAN AMERICAN): >60 ML/MIN/1.73 M^2
EST. GFR  (NON AFRICAN AMERICAN): >60 ML/MIN/1.73 M^2
GLUCOSE SERPL-MCNC: 110 MG/DL
HCT VFR BLD AUTO: 22.3 %
HCT VFR BLD AUTO: 30.5 %
HGB BLD-MCNC: 10.2 G/DL
HGB BLD-MCNC: 7.4 G/DL
LYMPHOCYTES # BLD AUTO: 1.8 K/UL
LYMPHOCYTES NFR BLD: 19.4 %
MCH RBC QN AUTO: 32.2 PG
MCHC RBC AUTO-ENTMCNC: 33.4 %
MCV RBC AUTO: 96 FL
MONOCYTES # BLD AUTO: 1 K/UL
MONOCYTES NFR BLD: 10.5 %
NEUTROPHILS # BLD AUTO: 5.8 K/UL
NEUTROPHILS NFR BLD: 64.2 %
PLATELET # BLD AUTO: 112 K/UL
PMV BLD AUTO: 9.4 FL
POTASSIUM SERPL-SCNC: 3.8 MMOL/L
RBC # BLD AUTO: 3.17 M/UL
SODIUM SERPL-SCNC: 139 MMOL/L
TRANS ERYTHROCYTES VOL PATIENT: NORMAL ML
TRANS ERYTHROCYTES VOL PATIENT: NORMAL ML
WBC # BLD AUTO: 9.05 K/UL

## 2017-06-10 PROCEDURE — 80048 BASIC METABOLIC PNL TOTAL CA: CPT

## 2017-06-10 PROCEDURE — 86900 BLOOD TYPING SEROLOGIC ABO: CPT

## 2017-06-10 PROCEDURE — 12000002 HC ACUTE/MED SURGE SEMI-PRIVATE ROOM

## 2017-06-10 PROCEDURE — 36415 COLL VENOUS BLD VENIPUNCTURE: CPT

## 2017-06-10 PROCEDURE — 25000003 PHARM REV CODE 250: Performed by: INTERNAL MEDICINE

## 2017-06-10 PROCEDURE — 85025 COMPLETE CBC W/AUTO DIFF WBC: CPT

## 2017-06-10 PROCEDURE — P9021 RED BLOOD CELLS UNIT: HCPCS

## 2017-06-10 PROCEDURE — 97530 THERAPEUTIC ACTIVITIES: CPT

## 2017-06-10 PROCEDURE — 97116 GAIT TRAINING THERAPY: CPT

## 2017-06-10 PROCEDURE — 85014 HEMATOCRIT: CPT

## 2017-06-10 PROCEDURE — 86920 COMPATIBILITY TEST SPIN: CPT

## 2017-06-10 PROCEDURE — 86901 BLOOD TYPING SEROLOGIC RH(D): CPT

## 2017-06-10 PROCEDURE — 85018 HEMOGLOBIN: CPT

## 2017-06-10 PROCEDURE — 25000003 PHARM REV CODE 250: Performed by: HOSPITALIST

## 2017-06-10 PROCEDURE — 63600175 PHARM REV CODE 636 W HCPCS: Performed by: ORTHOPAEDIC SURGERY

## 2017-06-10 RX ORDER — LACTULOSE 10 G/15ML
30 SOLUTION ORAL EVERY 8 HOURS
Status: DISCONTINUED | OUTPATIENT
Start: 2017-06-10 | End: 2017-06-13

## 2017-06-10 RX ORDER — HYDROCODONE BITARTRATE AND ACETAMINOPHEN 500; 5 MG/1; MG/1
TABLET ORAL
Status: DISCONTINUED | OUTPATIENT
Start: 2017-06-10 | End: 2017-06-14 | Stop reason: HOSPADM

## 2017-06-10 RX ADMIN — ASPIRIN 81 MG: 81 TABLET, COATED ORAL at 09:06

## 2017-06-10 RX ADMIN — FOLIC ACID 1 MG: 1 TABLET ORAL at 09:06

## 2017-06-10 RX ADMIN — ENOXAPARIN SODIUM 40 MG: 100 INJECTION SUBCUTANEOUS at 12:06

## 2017-06-10 RX ADMIN — AMLODIPINE BESYLATE 5 MG: 5 TABLET ORAL at 09:06

## 2017-06-10 RX ADMIN — CLONIDINE HYDROCHLORIDE 0.1 MG: 0.1 TABLET ORAL at 06:06

## 2017-06-10 RX ADMIN — LACTULOSE 30 G: 20 SOLUTION ORAL at 09:06

## 2017-06-10 RX ADMIN — HYDROCODONE BITARTRATE AND ACETAMINOPHEN 1 TABLET: 5; 325 TABLET ORAL at 01:06

## 2017-06-10 RX ADMIN — MEMANTINE HYDROCHLORIDE 28 MG: 28 CAPSULE, EXTENDED RELEASE ORAL at 09:06

## 2017-06-10 RX ADMIN — LEVOTHYROXINE SODIUM 25 MCG: 25 TABLET ORAL at 06:06

## 2017-06-10 RX ADMIN — SERTRALINE HYDROCHLORIDE 25 MG: 25 TABLET ORAL at 09:06

## 2017-06-10 NOTE — PROGRESS NOTES
Unit NO 1 of PRBC started by Brianne charge nurse. V/S stable. No c/o of pain or discomfort. RN remained with patient x 15 minutes. Daughter at bedside.

## 2017-06-10 NOTE — PLAN OF CARE
Problem: Patient Care Overview  Goal: Plan of Care Review  Outcome: Ongoing (interventions implemented as appropriate)  PT and OT working with patient. Received 2 units of PRBC's today. Monitor H/H. Incontinent of urine. Requires frequent reorientation. Observe incision.

## 2017-06-10 NOTE — PROGRESS NOTES
[facility] Hospital Medicine  Progress Note    Patient Name: Pearl Woodard  MRN: [id number]  Patient Class: IP- Inpatient   Admission Date: 6/7/2017  Length of Stay: 3 days  Attending Physician: Matt Pacheco MD  Primary Care Provider: Jarad Daniels MD        Subjective:     Principal Problem:Closed left hip fracture    HPI:  Mrs. Pearl Woodard is a 76 y.o. female with essential hypertension, hypothyroidism (TSH unknown), rheumatoid arthritis, depression, and dementia who presents to [facility] ED with complaints of a fall today.  She was in her bathroom and was reaching for her walker tonight when she slipped and fell.  She cannot contribute meaningfully to her interview only to say that she is here for a fall and that she is nervous.  Further history is limited at this time.    Hospital Course:  Patient admitted to the hospital for eval and treatment of L hip fracture after mechanical fall.  Ortho was consulted and the patient underwent repair on 6/8.  was consulted for SNF placement.  PT/OT were consulted and recommend SNF.     Interval History: Doing well. No complaints. Pain controlled.     Review of Systems   Constitutional: Negative for activity change.   Respiratory: Negative for chest tightness and shortness of breath.    Cardiovascular: Negative for chest pain.   Gastrointestinal: Negative for abdominal pain.     Objective:     Vital Signs (Most Recent):  Temp: 97.6 °F (36.4 °C) (06/10/17 0453)  Pulse: 88 (06/10/17 0453)  Resp: 17 (06/10/17 0453)  BP: (!) 186/81 (06/10/17 0453)  SpO2: 97 % (06/10/17 0453) Vital Signs (24h Range):  Temp:  [97.6 °F (36.4 °C)-98.7 °F (37.1 °C)] 97.6 °F (36.4 °C)  Pulse:  [81-96] 88  Resp:  [17-18] 17  SpO2:  [92 %-97 %] 97 %  BP: (125-186)/(56-81) 186/81     Weight: 56.8 kg (125 lb 3.5 oz)  Body mass index is 20.52 kg/m².    Intake/Output Summary (Last 24 hours) at 06/10/17 0641  Last data filed at 06/10/17 0600   Gross per  24 hour   Intake            982.5 ml   Output                0 ml   Net            982.5 ml      Physical Exam   Constitutional: She is oriented to person, place, and time. She appears well-developed and well-nourished.   HENT:   Head: Normocephalic and atraumatic.   Cardiovascular: Normal rate.    Pulmonary/Chest: Effort normal.   Abdominal: Soft.   Neurological: She is alert and oriented to person, place, and time.     Interval History: Doing well. No complaints. Pain controlled.     Review of Systems   Constitutional: Negative for activity change.   Respiratory: Negative for chest tightness and shortness of breath.    Cardiovascular: Negative for chest pain.   Gastrointestinal: Negative for abdominal pain.     Objective:     Vital Signs (Most Recent):  Temp: 97.6 °F (36.4 °C) (06/10/17 0453)  Pulse: 88 (06/10/17 0453)  Resp: 17 (06/10/17 0453)  BP: (!) 186/81 (06/10/17 0453)  SpO2: 97 % (06/10/17 0453) Vital Signs (24h Range):  Temp:  [97.6 °F (36.4 °C)-98.7 °F (37.1 °C)] 97.6 °F (36.4 °C)  Pulse:  [81-96] 88  Resp:  [17-18] 17  SpO2:  [92 %-97 %] 97 %  BP: (125-186)/(56-81) 186/81     Weight: 56.8 kg (125 lb 3.5 oz)  Body mass index is 20.52 kg/m².    Intake/Output Summary (Last 24 hours) at 06/10/17 0641  Last data filed at 06/10/17 0600   Gross per 24 hour   Intake            982.5 ml   Output                0 ml   Net            982.5 ml      Physical Exam   Constitutional: She is oriented to person, place, and time. She appears well-developed and well-nourished.   HENT:   Head: Normocephalic and atraumatic.   Cardiovascular: Normal rate.    Pulmonary/Chest: Effort normal.   Abdominal: Soft.   Neurological: She is alert and oriented to person, place, and time.       Significant Labs:   BMP:     Recent Labs  Lab 06/10/17  6156         K 3.8      CO2 25   BUN 12   CREATININE 0.5   CALCIUM 8.0*     CBC:     Recent Labs  Lab 06/08/17  1644 06/09/17  0331 06/10/17  0418   WBC  --  9.95  --     HGB 11.5* 9.5*  9.5* 7.4*   HCT 35.0* 29.4*  29.4* 22.3*   PLT  --  136*  --        Magnesium:  No results for input(s): MG in the last 48 hours.    Significant Imaging:    Significant Labs:   BMP:     Recent Labs  Lab 06/10/17  0418         K 3.8      CO2 25   BUN 12   CREATININE 0.5   CALCIUM 8.0*     CBC:     Recent Labs  Lab 06/08/17  1644 06/09/17  0331 06/10/17  0418   WBC  --  9.95  --    HGB 11.5* 9.5*  9.5* 7.4*   HCT 35.0* 29.4*  29.4* 22.3*   PLT  --  136*  --        Magnesium:  No results for input(s): MG in the last 48 hours.    Significant Imaging:    Assessment/Plan:      * Closed left hip fracture    S/p repair on 6/8.  Doing well. PT/OT rec. SNF  PPD placed 6/9        Malnutrition of moderate degree    Will discuss with patient           Acute blood loss anemia    As expected from surgery. Noted drop on 6/10. Will recheck at 12.           Dementia without behavioral disturbance    Stable; will continue her home regimen of memantine.        Depression    Stable; will continue her home regimen of sertraline.        Essential hypertension    Patient's blood pressure is well-controlled; will continue home regimen of amlodipine, and provide as-needed clonidine.        Hypothyroidism    Will continue her home regimen of levothyroxine.        Rheumatoid arthritis    Stable; will continue her home regimen of methotrexate.          VTE Risk Mitigation         Ordered     enoxaparin injection 40 mg  Every 24 hours (non-standard times)     Route:  Subcutaneous        06/08/17 1642     Medium Risk of VTE  Once      06/08/17 1642        To SNF likely Mon or Tues. Repeat H/H at noon.    Matt Fagan MD  Department of Hospital Medicine   [facility]

## 2017-06-10 NOTE — SUBJECTIVE & OBJECTIVE
Interval History: Doing well. No complaints. Pain controlled.     Review of Systems   Constitutional: Negative for activity change.   Respiratory: Negative for chest tightness and shortness of breath.    Cardiovascular: Negative for chest pain.   Gastrointestinal: Negative for abdominal pain.     Objective:     Vital Signs (Most Recent):  Temp: 97.6 °F (36.4 °C) (06/10/17 0453)  Pulse: 88 (06/10/17 0453)  Resp: 17 (06/10/17 0453)  BP: (!) 186/81 (06/10/17 0453)  SpO2: 97 % (06/10/17 0453) Vital Signs (24h Range):  Temp:  [97.6 °F (36.4 °C)-98.7 °F (37.1 °C)] 97.6 °F (36.4 °C)  Pulse:  [81-96] 88  Resp:  [17-18] 17  SpO2:  [92 %-97 %] 97 %  BP: (125-186)/(56-81) 186/81     Weight: 56.8 kg (125 lb 3.5 oz)  Body mass index is 20.52 kg/m².    Intake/Output Summary (Last 24 hours) at 06/10/17 0641  Last data filed at 06/10/17 0600   Gross per 24 hour   Intake            982.5 ml   Output                0 ml   Net            982.5 ml      Physical Exam   Constitutional: She is oriented to person, place, and time. She appears well-developed and well-nourished.   HENT:   Head: Normocephalic and atraumatic.   Cardiovascular: Normal rate.    Pulmonary/Chest: Effort normal.   Abdominal: Soft.   Neurological: She is alert and oriented to person, place, and time.     Interval History: Doing well. No complaints. Pain controlled.     Review of Systems   Constitutional: Negative for activity change.   Respiratory: Negative for chest tightness and shortness of breath.    Cardiovascular: Negative for chest pain.   Gastrointestinal: Negative for abdominal pain.     Objective:     Vital Signs (Most Recent):  Temp: 97.6 °F (36.4 °C) (06/10/17 0453)  Pulse: 88 (06/10/17 0453)  Resp: 17 (06/10/17 0453)  BP: (!) 186/81 (06/10/17 0453)  SpO2: 97 % (06/10/17 0453) Vital Signs (24h Range):  Temp:  [97.6 °F (36.4 °C)-98.7 °F (37.1 °C)] 97.6 °F (36.4 °C)  Pulse:  [81-96] 88  Resp:  [17-18] 17  SpO2:  [92 %-97 %] 97 %  BP: (125-186)/(56-81)  186/81     Weight: 56.8 kg (125 lb 3.5 oz)  Body mass index is 20.52 kg/m².    Intake/Output Summary (Last 24 hours) at 06/10/17 0641  Last data filed at 06/10/17 0600   Gross per 24 hour   Intake            982.5 ml   Output                0 ml   Net            982.5 ml      Physical Exam   Constitutional: She is oriented to person, place, and time. She appears well-developed and well-nourished.   HENT:   Head: Normocephalic and atraumatic.   Cardiovascular: Normal rate.    Pulmonary/Chest: Effort normal.   Abdominal: Soft.   Neurological: She is alert and oriented to person, place, and time.       Significant Labs:   BMP:     Recent Labs  Lab 06/10/17  0418         K 3.8      CO2 25   BUN 12   CREATININE 0.5   CALCIUM 8.0*     CBC:     Recent Labs  Lab 06/08/17 1644 06/09/17  0331 06/10/17  0418   WBC  --  9.95  --    HGB 11.5* 9.5*  9.5* 7.4*   HCT 35.0* 29.4*  29.4* 22.3*   PLT  --  136*  --        Magnesium:  No results for input(s): MG in the last 48 hours.    Significant Imaging:    Significant Labs:   BMP:     Recent Labs  Lab 06/10/17  0418         K 3.8      CO2 25   BUN 12   CREATININE 0.5   CALCIUM 8.0*     CBC:     Recent Labs  Lab 06/08/17 1644 06/09/17  0331 06/10/17  0418   WBC  --  9.95  --    HGB 11.5* 9.5*  9.5* 7.4*   HCT 35.0* 29.4*  29.4* 22.3*   PLT  --  136*  --        Magnesium:  No results for input(s): MG in the last 48 hours.    Significant Imaging:

## 2017-06-10 NOTE — PT/OT/SLP PROGRESS
Physical Therapy  Treatment    Pearl Woodard   MRN: [id number]   Admitting Diagnosis: Closed left hip fracture    PT Received On: 06/10/17  PT Start Time: 1400     PT Stop Time: 1450    PT Total Time (min): 50 min       Billable Minutes:  Gait [id number] and Therapeutic Activity 20    Treatment Type: Treatment  PT/PTA: PTA     PTA Visit Number: 1       General Precautions: Standard, fall  Orthopedic Precautions: LLE weight bearing as tolerated   Braces: N/A         Subjective:  Communicated with Niki prior to session.  Pt's  and dtr present and agreeable to therapy.    Pain/Comfort  Pain Rating 1: 6/10  Location - Orientation 1: lateral  Location 1: hip  Pain Addressed 1: Reposition, Distraction, Cessation of Activity    Objective:   Patient found with: peripheral IV    Functional Mobility:  Bed Mobility:   Rolling/Turning to Left: Moderate assistance  Scooting/Bridging: Moderate Assistance (using pad)  Supine to Sit: Moderate Assistance  Sit to Supine: Moderate Assistance, With leg lift    Transfers:  Sit <> Stand Assistance: Minimum Assistance  Sit <> Stand Assistive Device: Rolling Walker  Bed <> Chair Technique: Stand Pivot  Bed <> Chair Assistance: Minimum Assistance  Bed <> Chair Assistive Device: Other (see comments) ([facility])    Gait:   Gait Distance: /c AFO in L shoe for foot drop, 6FT X2, 10FT X 2 FB CHAIR /c several rest breaks due c/o dizziness, weakness, and fatigue  Assistance 1: Minimum assistance (with advancing rw)  Gait Assistive Device: Rolling walker  Gait Pattern: 3-point gait  Gait Deviation(s): decreased caroline, decreased stride length, increased time in double stance, decreased velocity of limb motion, decreased step length, decreased toe-to-floor clearance, decreased weight-shifting ability, toe drag    Stairs:      Balance:   Static Sit: FAIR: Maintains without assist, but unable to take any challenges   Dynamic Sit: FAIR+: Maintains balance through MINIMAL excursions of active  trunk motion  Static Stand: FAIR: Maintains without assist but unable to take challenges  Dynamic stand: FAIR: Needs CONTACT GUARD during gait     Therapeutic Activities and Exercises:  Sit<>stand from chair x 5 /c mod A.STATIC STAND AND 1-2 MIN EA   LLE AAROM HIP ABD/ADD X 10reps    AM-PAC 6 CLICK MOBILITY  How much help from another person does this patient currently need?   1 = Unable, Total/Dependent Assistance  2 = A lot, Maximum/Moderate Assistance  3 = A little, Minimum/Contact Guard/Supervision  4 = None, Modified [place]/Independent    Turning over in bed (including adjusting bedclothes, sheets and blankets)?: 2  Sitting down on and standing up from a chair with arms (e.g., wheelchair, bedside commode, etc.): 3  Moving from lying on back to sitting on the side of the bed?: 2  Moving to and from a bed to a chair (including a wheelchair)?: 3  Need to walk in hospital room?: 3  Climbing 3-5 steps with a railing?: 1  Total Score: 14    AM-PAC Raw Score CMS G-Code Modifier Level of Impairment Assistance   6 % Total / Unable   7 - 9 CM 80 - 100% Maximal Assist   10 - 14 CL 60 - 80% Moderate Assist   15 - 19 CK 40 - 60% Moderate Assist   20 - 22 CJ 20 - 40% Minimal Assist   23 CI 1-20% SBA / CGA   24 CH 0% Independent/ Mod I     Patient left HOB elevated with all lines intact, call button in reach, Niki notified and daughter and  present.    Assessment:  Pearl Woodard is a 76 y.o. female with a medical diagnosis of Closed left hip fracture and presents with c/o dizziness with amb short distance, increased weakness, and fatigue.  BP taken 119/57. Niki was notified..    Rehab identified problem list/impairments: Rehab identified problem list/impairments: weakness, impaired endurance, gait instability, impaired functional mobilty, impaired self care skills, impaired balance, impaired cognition, decreased coordination, decreased lower extremity function, decreased safety awareness, pain,  decreased ROM, impaired coordination    Rehab potential is Fair+.    Activity tolerance: Fair    Discharge recommendations: Discharge Facility/Level Of Care Needs:  (tbd)     Barriers to discharge: Barriers to Discharge: Other (Comment) (dementia)    Equipment recommendations: Equipment Needed After Discharge: walker, rolling, wheelchair     GOALS:    Physical Therapy Goals        Problem: Physical Therapy Goal    Goal Priority Disciplines Outcome Goal Variances Interventions   Physical Therapy Goal     PT/OT, PT Ongoing (interventions implemented as appropriate)     Description:  Goals to be met by: 2017    Patient will increase functional independence with mobility by performin. Supine to sit with Stand-by Assistance  2. Sit to stand transfer with Supervision  3. Gait  x 100 feet with Stand-by Assistance using Rolling Walker.   4. Lower extremity exercise program x20 reps per handout, with supervision  5. Bed to chair transfer with Stand-by Assistance using Rolling Walker.                        PLAN:    Patient to be seen BID  to address the above listed problems via gait training, therapeutic activities, therapeutic exercises  Plan of Care expires: 17  Plan of Care reviewed with: patient, spouse, daughter         Barney Turcios, PTA  06/10/2017

## 2017-06-10 NOTE — PROGRESS NOTES
Blood bank called to inquire if blood was ready and states not yet and she will call me when it's ready.

## 2017-06-10 NOTE — PROGRESS NOTES
Ortho Daily Progress Note    Pearl Woodard is a 76 y.o. female admitted on 6/7/2017      Chief Complaint/Reason for admission: Hip Pain (Pt. presents from home after fall. Per family report, pt. was reaching for walker while she fell. EMS personnel state that patient has shortening and rotation of the left leg. Pt. awake and alert. )       Hospital Day: 3  Post Op Day: 2 Days Post-Op     The patient was seen and examined this morning at the bedside. Patient reports no acute issues overnight.  Patient reports that pain is adequately controlled.    _______________    Vitals:    06/09/17 1958 06/09/17 2300 06/10/17 0453 06/10/17 0730   BP: (!) 148/65 132/64 (!) 186/81 (!) 135/55   Pulse: 90 95 88 77   Resp: 17 17 18   Temp: 98.7 °F (37.1 °C) 98.6 °F (37 °C) 97.6 °F (36.4 °C) 98 °F (36.7 °C)   TempSrc: Oral Oral Oral Oral   SpO2: 97% (!) 94% 97% 97%   Weight:       Height:           Vital Signs (Most Recent)  Temp: 98 °F (36.7 °C) (06/10/17 0730)  Pulse: 77 (06/10/17 0730)  Resp: 18 (06/10/17 0730)  BP: (!) 135/55 (06/10/17 0730)  SpO2: 97 % (06/10/17 0730)    Vital Signs Range (Last 24H):  Temp:  [97.6 °F (36.4 °C)-98.7 °F (37.1 °C)]   Pulse:  [77-96]   Resp:  [17-18]   BP: (125-186)/(55-81)   SpO2:  [92 %-97 %]       Physical:    AAOx3  Incision/ dressing clean/dry/intact  NVI Distally  Palpable distal pulses  CR<3sec      Recent Labs      06/07/17   1845  06/08/17   0357  06/08/17   1644  06/09/17   0331  06/10/17   0418   K  4.3  4.2  3.9  4.2  4.2  3.8   CALCIUM  9.6  9.1  8.6*  8.3*  8.3*  8.0*   WBC  7.17  7.68   --   9.95   --    HGB  13.8  13.1  11.5*  9.5*  9.5*  7.4*   HCT  41.2  38.6  35.0*  29.4*  29.4*  22.3*   PLT  190  162   --   136*   --    INR  1.0   --    --    --    --        I/O last 3 completed shifts:  In: 1202.5 [P.O.:240; I.V.:862.5; IV Piggyback:100]  Out: 50 [Urine:50]          Assessment:  A/P POD 2 s/p left       Hip IMN        Plan:    PT/OT: WBAT  Pain Control  DVT  Prophylaxis: Lovenox  Acute expected blood loss anemia symptomatic (will transfuse)    Discharge Plan: possibly DC to home if working well with PT   Pt with severe dementia and the  cares for her well. He would like to take her home with HH. And they have an aid at home as well.        Haroldo Mohan MD  [facility]

## 2017-06-10 NOTE — PT/OT/SLP PROGRESS
Occupational Therapy      Pearl Woodard  MRN: [id number]    Patient not seen today secondary to decreased H/H (7.4/22.3) awaiting 2 units PRBC's. Will follow-up Monday as able.    SONAL Diop, MS  6/10/2017

## 2017-06-10 NOTE — PLAN OF CARE
Problem: Patient Care Overview  Goal: Plan of Care Review   06/10/17 0515   Coping/Psychosocial   Plan Of Care Reviewed With patient     Pt free from injury this evening. Dried drainage noted to L hip dressing. Pt's orientation mush better this evening-aox3. Pain controlled. POC continued.

## 2017-06-11 LAB
ANION GAP SERPL CALC-SCNC: 9 MMOL/L
BUN SERPL-MCNC: 10 MG/DL
CALCIUM SERPL-MCNC: 8.4 MG/DL
CHLORIDE SERPL-SCNC: 110 MMOL/L
CO2 SERPL-SCNC: 19 MMOL/L
CREAT SERPL-MCNC: 0.5 MG/DL
EST. GFR  (AFRICAN AMERICAN): >60 ML/MIN/1.73 M^2
EST. GFR  (NON AFRICAN AMERICAN): >60 ML/MIN/1.73 M^2
GLUCOSE SERPL-MCNC: 98 MG/DL
HCT VFR BLD AUTO: 31.6 %
HGB BLD-MCNC: 10.7 G/DL
POTASSIUM SERPL-SCNC: 4 MMOL/L
SODIUM SERPL-SCNC: 138 MMOL/L

## 2017-06-11 PROCEDURE — 85018 HEMOGLOBIN: CPT

## 2017-06-11 PROCEDURE — 63600175 PHARM REV CODE 636 W HCPCS: Performed by: ORTHOPAEDIC SURGERY

## 2017-06-11 PROCEDURE — 25000003 PHARM REV CODE 250: Performed by: INTERNAL MEDICINE

## 2017-06-11 PROCEDURE — 97116 GAIT TRAINING THERAPY: CPT

## 2017-06-11 PROCEDURE — 80048 BASIC METABOLIC PNL TOTAL CA: CPT

## 2017-06-11 PROCEDURE — 97530 THERAPEUTIC ACTIVITIES: CPT

## 2017-06-11 PROCEDURE — 12000002 HC ACUTE/MED SURGE SEMI-PRIVATE ROOM

## 2017-06-11 PROCEDURE — 85014 HEMATOCRIT: CPT

## 2017-06-11 PROCEDURE — 36415 COLL VENOUS BLD VENIPUNCTURE: CPT

## 2017-06-11 PROCEDURE — 97110 THERAPEUTIC EXERCISES: CPT

## 2017-06-11 RX ADMIN — HYDROCODONE BITARTRATE AND ACETAMINOPHEN 1 TABLET: 5; 325 TABLET ORAL at 10:06

## 2017-06-11 RX ADMIN — RAMELTEON 8 MG: 8 TABLET, FILM COATED ORAL at 08:06

## 2017-06-11 RX ADMIN — SERTRALINE HYDROCHLORIDE 25 MG: 25 TABLET ORAL at 09:06

## 2017-06-11 RX ADMIN — HYDROCODONE BITARTRATE AND ACETAMINOPHEN 1 TABLET: 5; 325 TABLET ORAL at 08:06

## 2017-06-11 RX ADMIN — HYDROCODONE BITARTRATE AND ACETAMINOPHEN 1 TABLET: 5; 325 TABLET ORAL at 07:06

## 2017-06-11 RX ADMIN — AMLODIPINE BESYLATE 5 MG: 5 TABLET ORAL at 09:06

## 2017-06-11 RX ADMIN — ASPIRIN 81 MG: 81 TABLET, COATED ORAL at 09:06

## 2017-06-11 RX ADMIN — ENOXAPARIN SODIUM 40 MG: 100 INJECTION SUBCUTANEOUS at 12:06

## 2017-06-11 RX ADMIN — FOLIC ACID 1 MG: 1 TABLET ORAL at 09:06

## 2017-06-11 RX ADMIN — ENOXAPARIN SODIUM 40 MG: 100 INJECTION SUBCUTANEOUS at 11:06

## 2017-06-11 RX ADMIN — HYDROCODONE BITARTRATE AND ACETAMINOPHEN 1 TABLET: 5; 325 TABLET ORAL at 12:06

## 2017-06-11 RX ADMIN — LEVOTHYROXINE SODIUM 25 MCG: 25 TABLET ORAL at 05:06

## 2017-06-11 RX ADMIN — MEMANTINE HYDROCHLORIDE 28 MG: 28 CAPSULE, EXTENDED RELEASE ORAL at 09:06

## 2017-06-11 NOTE — PT/OT/SLP PROGRESS
Physical Therapy  Treatment    Pearl Woodard   MRN: [id number]   Admitting Diagnosis: Closed left hip fracture    PT Received On: 06/11/17  PT Start Time: 1130     PT Stop Time: 1230    PT Total Time (min): 60 min       Billable Minutes:  Gait [id number], Therapeutic Activity 15 and Therapeutic Exercise 15    Treatment Type: Treatment  PT/PTA: PTA     PTA Visit Number: 2       General Precautions: Standard, fall  Orthopedic Precautions: LLE weight bearing as tolerated   Braces: N/A         Subjective:  Communicated with HARITHA Prince prior to session.  Pt and dtr agreed to therapy    Pain/Comfort  Pain Rating 1: 0/10    Objective:   Patient found with: peripheral IV    Functional Mobility:  Bed Mobility:   Rolling/Turning to Left: Minimum assistance  Supine to Sit: Minimum Assistance    Transfers:  Sit <> Stand Assistance: Minimum Assistance  Sit <> Stand Assistive Device: Rolling Walker  Bed <> Chair Technique: Stand Pivot  Bed <> Chair Assistance: Minimum Assistance    Gait:   Gait Distance: 30FT X2 F/B chair /c 2 sitting rest  Assistance 1: Contact Guard Assistance, Minimum assistance (min A /c advancing rw)  Gait Assistive Device: Rolling walker  Gait Pattern: 3-point gait  Gait Deviation(s): decreased caroline, increased time in double stance, decreased velocity of limb motion, decreased step length, decreased stride length, decreased swing-to-stance ratio, decreased toe-to-floor clearance, decreased weight-shifting ability    Stairs:      Balance:   Static Sit: FAIR: Maintains without assist, but unable to take any challenges   Dynamic Sit: FAIR: Cannot move trunk without losing balance  Static Stand: FAIR: Maintains without assist but unable to take challenges  Dynamic stand: FAIR: Needs CONTACT GUARD during gait     Therapeutic Activities and Exercises:  BLE TE 2X10; LAQ, HIP FLEX, HR, TT  Sit to stand x 3 with MOD A     AM-PAC 6 CLICK MOBILITY  How much help from another person does this patient currently  need?   1 = Unable, Total/Dependent Assistance  2 = A lot, Maximum/Moderate Assistance  3 = A little, Minimum/Contact Guard/Supervision  4 = None, Modified [place]/Independent    Turning over in bed (including adjusting bedclothes, sheets and blankets)?: 2  Sitting down on and standing up from a chair with arms (e.g., wheelchair, bedside commode, etc.): 2  Moving from lying on back to sitting on the side of the bed?: 2  Moving to and from a bed to a chair (including a wheelchair)?: 3  Need to walk in hospital room?: 3  Climbing 3-5 steps with a railing?: 1  Total Score: 13    AM-PAC Raw Score CMS G-Code Modifier Level of Impairment Assistance   6 % Total / Unable   7 - 9 CM 80 - 100% Maximal Assist   10 - 14 CL 60 - 80% Moderate Assist   15 - 19 CK 40 - 60% Moderate Assist   20 - 22 CJ 20 - 40% Minimal Assist   23 CI 1-20% SBA / CGA   24 CH 0% Independent/ Mod I     Patient left up in chair with call button in reach, NURSE notified and daughter present.    Assessment:  Pearl Woodard is a 76 y.o. female with a medical diagnosis of Closed left hip fracture and presents with increased confusion needing increased time for all activities.  Pt with increase gt distance but needed max vcs/min A to advance rw and to stay on task, and for proper sequencing of BLEs.  Pt with very slow caroline and increased time in dble stance  Pt required a sitting break /p 30ft. .    Rehab identified problem list/impairments: Rehab identified problem list/impairments: weakness, impaired endurance, gait instability, impaired functional mobilty, impaired self care skills, impaired balance, impaired cognition, decreased lower extremity function, decreased coordination, decreased safety awareness, pain, decreased ROM, impaired coordination, orthopedic precautions    Rehab potential is fair.    Activity tolerance: Fair    Discharge recommendations: Discharge Facility/Level Of Care Needs:  (tbd)     Barriers to discharge:  Barriers to Discharge: Other (Comment) (dementia)    Equipment recommendations: Equipment Needed After Discharge: walker, rolling, wheelchair     GOALS:    Physical Therapy Goals        Problem: Physical Therapy Goal    Goal Priority Disciplines Outcome Goal Variances Interventions   Physical Therapy Goal     PT/OT, PT Ongoing (interventions implemented as appropriate)     Description:  Goals to be met by: 2017    Patient will increase functional independence with mobility by performin. Supine to sit with Stand-by Assistance  2. Sit to stand transfer with Supervision  3. Gait  x 100 feet with Stand-by Assistance using Rolling Walker.   4. Lower extremity exercise program x20 reps per handout, with supervision  5. Bed to chair transfer with Stand-by Assistance using Rolling Walker.                        PLAN:    Patient to be seen BID  to address the above listed problems via gait training, therapeutic activities, therapeutic exercises  Plan of Care expires: 17  Plan of Care reviewed with: patient         Barney Lushelli, PTA  2017

## 2017-06-11 NOTE — PROGRESS NOTES
[facility] Hospital Medicine  Progress Note    Patient Name: Pearl Woodard  MRN: [id number]  Patient Class: IP- Inpatient   Admission Date: 6/7/2017  Length of Stay: 4 days  Attending Physician: Matt Pacheco MD  Primary Care Provider: Jarad Daniels MD        Subjective:     Principal Problem:Closed left hip fracture    HPI:  Mrs. Pearl Woodard is a 76 y.o. female with essential hypertension, hypothyroidism (TSH unknown), rheumatoid arthritis, depression, and dementia who presents to [facility] ED with complaints of a fall today.  She was in her bathroom and was reaching for her walker tonight when she slipped and fell.  She cannot contribute meaningfully to her interview only to say that she is here for a fall and that she is nervous.  Further history is limited at this time.    Hospital Course:  Patient admitted to the hospital for eval and treatment of L hip fracture after mechanical fall.  Ortho was consulted and the patient underwent repair on 6/8.  was consulted for SNF placement.  PT/OT were consulted and recommend SNF.     Interval History: Doing well. No complaints. Pain controlled.     Review of Systems   Constitutional: Negative for activity change.   Respiratory: Negative for chest tightness and shortness of breath.    Cardiovascular: Negative for chest pain.   Gastrointestinal: Negative for abdominal pain.     Objective:     Vital Signs (Most Recent):  Temp: 97.6 °F (36.4 °C) (06/11/17 0915)  Pulse: 81 (06/11/17 0915)  Resp: 20 (06/11/17 0915)  BP: (!) 165/71 (06/11/17 0915)  SpO2: 95 % (06/11/17 0915) Vital Signs (24h Range):  Temp:  [97.6 °F (36.4 °C)-98.6 °F (37 °C)] 97.6 °F (36.4 °C)  Pulse:  [70-93] 81  Resp:  [16-20] 20  SpO2:  [95 %-99 %] 95 %  BP: (110-166)/(53-74) 165/71     Weight: 60.1 kg (132 lb 7.9 oz)  Body mass index is 21.71 kg/m².    Intake/Output Summary (Last 24 hours) at 06/11/17 1024  Last data filed at 06/11/17 0600   Gross per 24  hour   Intake             1435 ml   Output                0 ml   Net             1435 ml      Physical Exam   Constitutional: She is oriented to person, place, and time. She appears well-developed and well-nourished.   HENT:   Head: Normocephalic and atraumatic.   Cardiovascular: Normal rate.    Pulmonary/Chest: Effort normal.   Abdominal: Soft.   Neurological: She is alert and oriented to person, place, and time.     Interval History: Doing well. No complaints. Pain controlled.     Review of Systems   Constitutional: Negative for activity change.   Respiratory: Negative for chest tightness and shortness of breath.    Cardiovascular: Negative for chest pain.   Gastrointestinal: Negative for abdominal pain.     Objective:     Vital Signs (Most Recent):  Temp: 97.6 °F (36.4 °C) (06/11/17 0915)  Pulse: 81 (06/11/17 0915)  Resp: 20 (06/11/17 0915)  BP: (!) 165/71 (06/11/17 0915)  SpO2: 95 % (06/11/17 0915) Vital Signs (24h Range):  Temp:  [97.6 °F (36.4 °C)-98.6 °F (37 °C)] 97.6 °F (36.4 °C)  Pulse:  [70-93] 81  Resp:  [16-20] 20  SpO2:  [95 %-99 %] 95 %  BP: (110-166)/(53-74) 165/71     Weight: 60.1 kg (132 lb 7.9 oz)  Body mass index is 21.71 kg/m².    Intake/Output Summary (Last 24 hours) at 06/11/17 1025  Last data filed at 06/11/17 0600   Gross per 24 hour   Intake             1435 ml   Output                0 ml   Net             1435 ml      Physical Exam   Constitutional: She is oriented to person, place, and time. She appears well-developed and well-nourished.   HENT:   Head: Normocephalic and atraumatic.   Cardiovascular: Normal rate.    Pulmonary/Chest: Effort normal.   Abdominal: Soft.   Neurological: She is alert and oriented to person, place, and time.     Interval History: Doing well. No complaints. Pain controlled.     Review of Systems   Constitutional: Negative for activity change.   Respiratory: Negative for chest tightness and shortness of breath.    Cardiovascular: Negative for chest pain.    Gastrointestinal: Negative for abdominal pain.     Objective:     Vital Signs (Most Recent):  Temp: 97.6 °F (36.4 °C) (06/11/17 0915)  Pulse: 81 (06/11/17 0915)  Resp: 20 (06/11/17 0915)  BP: (!) 165/71 (06/11/17 0915)  SpO2: 95 % (06/11/17 0915) Vital Signs (24h Range):  Temp:  [97.6 °F (36.4 °C)-98.6 °F (37 °C)] 97.6 °F (36.4 °C)  Pulse:  [70-93] 81  Resp:  [16-20] 20  SpO2:  [95 %-99 %] 95 %  BP: (110-166)/(53-74) 165/71     Weight: 60.1 kg (132 lb 7.9 oz)  Body mass index is 21.71 kg/m².    Intake/Output Summary (Last 24 hours) at 06/11/17 1025  Last data filed at 06/11/17 0600   Gross per 24 hour   Intake             1435 ml   Output                0 ml   Net             1435 ml      Physical Exam   Constitutional: She is oriented to person, place, and time. She appears well-developed and well-nourished.   HENT:   Head: Normocephalic and atraumatic.   Cardiovascular: Normal rate.    Pulmonary/Chest: Effort normal.   Abdominal: Soft.   Neurological: She is alert and oriented to person, place, and time.       Significant Labs:   BMP:     Recent Labs  Lab 06/11/17  0332   GLU 98      K 4.0      CO2 19*   BUN 10   CREATININE 0.5   CALCIUM 8.4*     CBC:     Recent Labs  Lab 06/10/17  0418 06/10/17  2238 06/11/17  0332   WBC  --  9.05  --    HGB 7.4* 10.2* 10.7*   HCT 22.3* 30.5* 31.6*   PLT  --  112*  --        Magnesium:  No results for input(s): MG in the last 48 hours.    Significant Imaging:    Significant Labs:   BMP:     Recent Labs  Lab 06/11/17  0332   GLU 98      K 4.0      CO2 19*   BUN 10   CREATININE 0.5   CALCIUM 8.4*     CBC:     Recent Labs  Lab 06/10/17  0418 06/10/17  2238 06/11/17  0332   WBC  --  9.05  --    HGB 7.4* 10.2* 10.7*   HCT 22.3* 30.5* 31.6*   PLT  --  112*  --        Magnesium:  No results for input(s): MG in the last 48 hours.    Significant Imaging:  Interval History: Doing well. No complaints. Pain controlled.     Review of Systems   Constitutional:  Negative for activity change.   Respiratory: Negative for chest tightness and shortness of breath.    Cardiovascular: Negative for chest pain.   Gastrointestinal: Negative for abdominal pain.     Objective:     Vital Signs (Most Recent):  Temp: 97.6 °F (36.4 °C) (06/11/17 0915)  Pulse: 81 (06/11/17 0915)  Resp: 20 (06/11/17 0915)  BP: (!) 165/71 (06/11/17 0915)  SpO2: 95 % (06/11/17 0915) Vital Signs (24h Range):  Temp:  [97.6 °F (36.4 °C)-98.6 °F (37 °C)] 97.6 °F (36.4 °C)  Pulse:  [70-93] 81  Resp:  [16-20] 20  SpO2:  [95 %-99 %] 95 %  BP: (110-166)/(53-74) 165/71     Weight: 60.1 kg (132 lb 7.9 oz)  Body mass index is 21.71 kg/m².    Intake/Output Summary (Last 24 hours) at 06/11/17 1024  Last data filed at 06/11/17 0600   Gross per 24 hour   Intake             1435 ml   Output                0 ml   Net             1435 ml      Physical Exam   Constitutional: She is oriented to person, place, and time. She appears well-developed and well-nourished.   HENT:   Head: Normocephalic and atraumatic.   Cardiovascular: Normal rate.    Pulmonary/Chest: Effort normal.   Abdominal: Soft.   Neurological: She is alert and oriented to person, place, and time.       Significant Labs:   BMP:     Recent Labs  Lab 06/11/17  0332   GLU 98      K 4.0      CO2 19*   BUN 10   CREATININE 0.5   CALCIUM 8.4*     CBC:     Recent Labs  Lab 06/10/17  0418 06/10/17  2238 06/11/17  0332   WBC  --  9.05  --    HGB 7.4* 10.2* 10.7*   HCT 22.3* 30.5* 31.6*   PLT  --  112*  --        Magnesium:  No results for input(s): MG in the last 48 hours.    Significant Imaging:    Significant Labs:   BMP:     Recent Labs  Lab 06/11/17  0332   GLU 98      K 4.0      CO2 19*   BUN 10   CREATININE 0.5   CALCIUM 8.4*     CBC:     Recent Labs  Lab 06/10/17  0418 06/10/17  2238 06/11/17  0332   WBC  --  9.05  --    HGB 7.4* 10.2* 10.7*   HCT 22.3* 30.5* 31.6*   PLT  --  112*  --        Magnesium:  No results for input(s): MG in the last  48 hours.    Significant Imaging:    Assessment/Plan:      * Closed left hip fracture    S/p repair on 6/8.  Doing well. PT/OT rec. SNF  PPD placed 6/9        Malnutrition of moderate degree    Will discuss with patient           Acute blood loss anemia    As expected from surgery. Noted drop on 6/10. Will recheck at 12.  Received 2 units of blood.           Dementia without behavioral disturbance    Stable; will continue her home regimen of memantine.        Depression    Stable; will continue her home regimen of sertraline.        Essential hypertension    Patient's blood pressure is well-controlled; will continue home regimen of amlodipine, and provide as-needed clonidine.        Hypothyroidism    Will continue her home regimen of levothyroxine.        Rheumatoid arthritis    Stable; will continue her home regimen of methotrexate.          VTE Risk Mitigation         Ordered     enoxaparin injection 40 mg  Every 24 hours (non-standard times)     Route:  Subcutaneous        06/08/17 1642     Medium Risk of VTE  Once      06/08/17 1642        [facility] Monday or Tuesday     Matt Fagan MD  Department of Hospital Medicine   [facility]

## 2017-06-11 NOTE — PLAN OF CARE
Problem: Patient Care Overview  Goal: Plan of Care Review  No falls, trauma or injury this shift. Skin remains intact. Incision without redness, warmth or edema. Patient states she ambulated with PT and was able to perform her daily activities with little assitance. Continue with plan of care and continue to monitor patient.

## 2017-06-11 NOTE — SUBJECTIVE & OBJECTIVE
Interval History: Doing well. No complaints. Pain controlled.     Review of Systems   Constitutional: Negative for activity change.   Respiratory: Negative for chest tightness and shortness of breath.    Cardiovascular: Negative for chest pain.   Gastrointestinal: Negative for abdominal pain.     Objective:     Vital Signs (Most Recent):  Temp: 97.6 °F (36.4 °C) (06/11/17 0915)  Pulse: 81 (06/11/17 0915)  Resp: 20 (06/11/17 0915)  BP: (!) 165/71 (06/11/17 0915)  SpO2: 95 % (06/11/17 0915) Vital Signs (24h Range):  Temp:  [97.6 °F (36.4 °C)-98.6 °F (37 °C)] 97.6 °F (36.4 °C)  Pulse:  [70-93] 81  Resp:  [16-20] 20  SpO2:  [95 %-99 %] 95 %  BP: (110-166)/(53-74) 165/71     Weight: 60.1 kg (132 lb 7.9 oz)  Body mass index is 21.71 kg/m².    Intake/Output Summary (Last 24 hours) at 06/11/17 1024  Last data filed at 06/11/17 0600   Gross per 24 hour   Intake             1435 ml   Output                0 ml   Net             1435 ml      Physical Exam   Constitutional: She is oriented to person, place, and time. She appears well-developed and well-nourished.   HENT:   Head: Normocephalic and atraumatic.   Cardiovascular: Normal rate.    Pulmonary/Chest: Effort normal.   Abdominal: Soft.   Neurological: She is alert and oriented to person, place, and time.     Interval History: Doing well. No complaints. Pain controlled.     Review of Systems   Constitutional: Negative for activity change.   Respiratory: Negative for chest tightness and shortness of breath.    Cardiovascular: Negative for chest pain.   Gastrointestinal: Negative for abdominal pain.     Objective:     Vital Signs (Most Recent):  Temp: 97.6 °F (36.4 °C) (06/11/17 0915)  Pulse: 81 (06/11/17 0915)  Resp: 20 (06/11/17 0915)  BP: (!) 165/71 (06/11/17 0915)  SpO2: 95 % (06/11/17 0915) Vital Signs (24h Range):  Temp:  [97.6 °F (36.4 °C)-98.6 °F (37 °C)] 97.6 °F (36.4 °C)  Pulse:  [70-93] 81  Resp:  [16-20] 20  SpO2:  [95 %-99 %] 95 %  BP: (110-166)/(53-74) 165/71      Weight: 60.1 kg (132 lb 7.9 oz)  Body mass index is 21.71 kg/m².    Intake/Output Summary (Last 24 hours) at 06/11/17 1025  Last data filed at 06/11/17 0600   Gross per 24 hour   Intake             1435 ml   Output                0 ml   Net             1435 ml      Physical Exam   Constitutional: She is oriented to person, place, and time. She appears well-developed and well-nourished.   HENT:   Head: Normocephalic and atraumatic.   Cardiovascular: Normal rate.    Pulmonary/Chest: Effort normal.   Abdominal: Soft.   Neurological: She is alert and oriented to person, place, and time.     Interval History: Doing well. No complaints. Pain controlled.     Review of Systems   Constitutional: Negative for activity change.   Respiratory: Negative for chest tightness and shortness of breath.    Cardiovascular: Negative for chest pain.   Gastrointestinal: Negative for abdominal pain.     Objective:     Vital Signs (Most Recent):  Temp: 97.6 °F (36.4 °C) (06/11/17 0915)  Pulse: 81 (06/11/17 0915)  Resp: 20 (06/11/17 0915)  BP: (!) 165/71 (06/11/17 0915)  SpO2: 95 % (06/11/17 0915) Vital Signs (24h Range):  Temp:  [97.6 °F (36.4 °C)-98.6 °F (37 °C)] 97.6 °F (36.4 °C)  Pulse:  [70-93] 81  Resp:  [16-20] 20  SpO2:  [95 %-99 %] 95 %  BP: (110-166)/(53-74) 165/71     Weight: 60.1 kg (132 lb 7.9 oz)  Body mass index is 21.71 kg/m².    Intake/Output Summary (Last 24 hours) at 06/11/17 1025  Last data filed at 06/11/17 0600   Gross per 24 hour   Intake             1435 ml   Output                0 ml   Net             1435 ml      Physical Exam   Constitutional: She is oriented to person, place, and time. She appears well-developed and well-nourished.   HENT:   Head: Normocephalic and atraumatic.   Cardiovascular: Normal rate.    Pulmonary/Chest: Effort normal.   Abdominal: Soft.   Neurological: She is alert and oriented to person, place, and time.       Significant Labs:   BMP:     Recent Labs  Lab 06/11/17  0332   GLU 98   NA  138   K 4.0      CO2 19*   BUN 10   CREATININE 0.5   CALCIUM 8.4*     CBC:     Recent Labs  Lab 06/10/17  0418 06/10/17  2238 06/11/17  0332   WBC  --  9.05  --    HGB 7.4* 10.2* 10.7*   HCT 22.3* 30.5* 31.6*   PLT  --  112*  --        Magnesium:  No results for input(s): MG in the last 48 hours.    Significant Imaging:    Significant Labs:   BMP:     Recent Labs  Lab 06/11/17 0332   GLU 98      K 4.0      CO2 19*   BUN 10   CREATININE 0.5   CALCIUM 8.4*     CBC:     Recent Labs  Lab 06/10/17  0418 06/10/17  2238 06/11/17  0332   WBC  --  9.05  --    HGB 7.4* 10.2* 10.7*   HCT 22.3* 30.5* 31.6*   PLT  --  112*  --        Magnesium:  No results for input(s): MG in the last 48 hours.    Significant Imaging:  Interval History: Doing well. No complaints. Pain controlled.     Review of Systems   Constitutional: Negative for activity change.   Respiratory: Negative for chest tightness and shortness of breath.    Cardiovascular: Negative for chest pain.   Gastrointestinal: Negative for abdominal pain.     Objective:     Vital Signs (Most Recent):  Temp: 97.6 °F (36.4 °C) (06/11/17 0915)  Pulse: 81 (06/11/17 0915)  Resp: 20 (06/11/17 0915)  BP: (!) 165/71 (06/11/17 0915)  SpO2: 95 % (06/11/17 0915) Vital Signs (24h Range):  Temp:  [97.6 °F (36.4 °C)-98.6 °F (37 °C)] 97.6 °F (36.4 °C)  Pulse:  [70-93] 81  Resp:  [16-20] 20  SpO2:  [95 %-99 %] 95 %  BP: (110-166)/(53-74) 165/71     Weight: 60.1 kg (132 lb 7.9 oz)  Body mass index is 21.71 kg/m².    Intake/Output Summary (Last 24 hours) at 06/11/17 1024  Last data filed at 06/11/17 0600   Gross per 24 hour   Intake             1435 ml   Output                0 ml   Net             1435 ml      Physical Exam   Constitutional: She is oriented to person, place, and time. She appears well-developed and well-nourished.   HENT:   Head: Normocephalic and atraumatic.   Cardiovascular: Normal rate.    Pulmonary/Chest: Effort normal.   Abdominal: Soft.   Neurological:  She is alert and oriented to person, place, and time.       Significant Labs:   BMP:     Recent Labs  Lab 06/11/17 0332   GLU 98      K 4.0      CO2 19*   BUN 10   CREATININE 0.5   CALCIUM 8.4*     CBC:     Recent Labs  Lab 06/10/17  0418 06/10/17  2238 06/11/17  0332   WBC  --  9.05  --    HGB 7.4* 10.2* 10.7*   HCT 22.3* 30.5* 31.6*   PLT  --  112*  --        Magnesium:  No results for input(s): MG in the last 48 hours.    Significant Imaging:    Significant Labs:   BMP:     Recent Labs  Lab 06/11/17 0332   GLU 98      K 4.0      CO2 19*   BUN 10   CREATININE 0.5   CALCIUM 8.4*     CBC:     Recent Labs  Lab 06/10/17  0418 06/10/17  2238 06/11/17  0332   WBC  --  9.05  --    HGB 7.4* 10.2* 10.7*   HCT 22.3* 30.5* 31.6*   PLT  --  112*  --        Magnesium:  No results for input(s): MG in the last 48 hours.    Significant Imaging:

## 2017-06-11 NOTE — PLAN OF CARE
Problem: Patient Care Overview  Goal: Plan of Care Review  Outcome: Ongoing (interventions implemented as appropriate)  Continue with PT and OT. Aquacel dressing applied to incisions this a.m. Gardner intact. No drainage noted. Remains confused and becomes agitated at times. Monitor blood work. Family deciding whether to take her home or to a skilled facility.

## 2017-06-11 NOTE — PROGRESS NOTES
Ortho Daily Progress Note    Pearl Woodard is a 76 y.o. female admitted on 6/7/2017      Chief Complaint/Reason for admission: Hip Pain (Pt. presents from home after fall. Per family report, pt. was reaching for walker while she fell. EMS personnel state that patient has shortening and rotation of the left leg. Pt. awake and alert. )       Hospital Day: 4  Post Op Day: 3 Days Post-Op     The patient was seen and examined this morning at the bedside. Patient reports no acute issues overnight.  Patient reports that pain is adequately controlled.    _______________    Vitals:    06/10/17 2030 06/10/17 2109 06/11/17 0008 06/11/17 0511   BP: (!) 123/58 128/62 (!) 163/70 (!) 166/74   Pulse: 91 91 83 82   Resp: 18 18 18 20   Temp: 98.5 °F (36.9 °C) 98.6 °F (37 °C) 98.5 °F (36.9 °C) 98.5 °F (36.9 °C)   TempSrc: Oral Oral     SpO2: 98% 98% 96% 98%   Weight:       Height:           Vital Signs (Most Recent)  Temp: 98.5 °F (36.9 °C) (06/11/17 0511)  Pulse: 82 (06/11/17 0511)  Resp: 20 (06/11/17 0511)  BP: (!) 166/74 (06/11/17 0511)  SpO2: 98 % (06/11/17 0511)    Vital Signs Range (Last 24H):  Temp:  [98.3 °F (36.8 °C)-98.6 °F (37 °C)]   Pulse:  [70-93]   Resp:  [16-20]   BP: (110-166)/(53-74)   SpO2:  [96 %-99 %]       Physical:    Baseline dementia  Incision/ dressing clean/dry/intact  NVI Distally  Palpable distal pulses  CR<3sec      Recent Labs      06/09/17   0331  06/10/17   0418  06/10/17   2238  06/11/17 0332   K  4.2  4.2  3.8   --   4.0   CALCIUM  8.3*  8.3*  8.0*   --   8.4*   WBC  9.95   --   9.05   --    HGB  9.5*  9.5*  7.4*  10.2*  10.7*   HCT  29.4*  29.4*  22.3*  30.5*  31.6*   PLT  136*   --   112*   --        I/O last 3 completed shifts:  In: 1795 [P.O.:1080; I.V.:100; Blood:615]  Out: -           Assessment:  A/P POD 3 s/p left      Hip IMN         Plan:    PT/OT: WBAT  Pain Control  DVT Prophylaxis: Lovenox    Discharge Plan: Pt may possibly DC home with  as caregiver  Would like PT  to weight in on if they feel this is safe        Haroldo Mohan MD  [facility]

## 2017-06-12 LAB — TB INDURATION 48 - 72 HR READ: 0 MM

## 2017-06-12 PROCEDURE — 97110 THERAPEUTIC EXERCISES: CPT

## 2017-06-12 PROCEDURE — 97116 GAIT TRAINING THERAPY: CPT

## 2017-06-12 PROCEDURE — 97535 SELF CARE MNGMENT TRAINING: CPT

## 2017-06-12 PROCEDURE — 25000003 PHARM REV CODE 250: Performed by: INTERNAL MEDICINE

## 2017-06-12 PROCEDURE — 63600175 PHARM REV CODE 636 W HCPCS: Performed by: INTERNAL MEDICINE

## 2017-06-12 PROCEDURE — 12000002 HC ACUTE/MED SURGE SEMI-PRIVATE ROOM

## 2017-06-12 RX ADMIN — ASPIRIN 81 MG: 81 TABLET, COATED ORAL at 08:06

## 2017-06-12 RX ADMIN — ACETAMINOPHEN 500 MG: 500 TABLET ORAL at 08:06

## 2017-06-12 RX ADMIN — FOLIC ACID 1 MG: 1 TABLET ORAL at 08:06

## 2017-06-12 RX ADMIN — ONDANSETRON 8 MG: 2 INJECTION INTRAMUSCULAR; INTRAVENOUS at 12:06

## 2017-06-12 RX ADMIN — LEVOTHYROXINE SODIUM 25 MCG: 25 TABLET ORAL at 06:06

## 2017-06-12 RX ADMIN — MEMANTINE HYDROCHLORIDE 28 MG: 28 CAPSULE, EXTENDED RELEASE ORAL at 08:06

## 2017-06-12 RX ADMIN — SERTRALINE HYDROCHLORIDE 25 MG: 25 TABLET ORAL at 08:06

## 2017-06-12 RX ADMIN — AMLODIPINE BESYLATE 5 MG: 5 TABLET ORAL at 08:06

## 2017-06-12 RX ADMIN — HYDROCODONE BITARTRATE AND ACETAMINOPHEN 1 TABLET: 5; 325 TABLET ORAL at 08:06

## 2017-06-12 NOTE — PT/OT/SLP PROGRESS
Physical Therapy  AM Treatment    Pearl Woodard   MRN: [id number]   Admitting Diagnosis: Closed left hip fracture    PT Received On: 06/12/17  PT Start Time: 1028     PT Stop Time: 1100    PT Total Time (min): 32 min       Billable Minutes:  Gait [id number] and Therapeutic Exercise 16    Treatment Type: Treatment  PT/PTA: PT     PTA Visit Number: 0       General Precautions: Standard, fall  Orthopedic Precautions: LLE weight bearing as tolerated      Subjective:  Pt reported not having a good night and that she was nauseated.     Pain/Comfort  Pain Rating 1: 0/10  Pain Addressed 1: Pre-medicate for activity    Objective:   Patient found with: peripheral IV    Functional Mobility:  Bed Mobility:   Rolling/Turning to Left: Moderate assistance  Scooting/Bridging: Moderate Assistance  Supine to Sit: Moderate Assistance    Transfers:  Sit <> Stand Assistance: Minimum Assistance  Sit <> Stand Assistive Device: Rolling Walker    Gait:   Gait Distance: 50ft with seated break then 20ft  (Pt required verbal cues to continue advancing BLE. Pt required verbal and active assistance to continue pushing rolling walker. )  Assistance 1: Minimum assistance  Gait Assistive Device: Rolling walker, L AFO  Gait Pattern: reciprocal  Gait Deviation(s): decreased caroline, increased time in double stance, decreased velocity of limb motion, decreased step length, decreased stride length, decreased toe-to-floor clearance, forward lean    Balance:   Static Sit: FAIR: Maintains without assist, but unable to take any challenges   Static Stand: FAIR: Maintains without assist but unable to take challenges  Dynamic stand: POOR: Needs Minimum Assistance during gait    Therapeutic Activities and Exercises:  Pt performed the following exercises seated in chair reclined with legs elevated BLE x10.     Quad sets   Glute sets   Hip Abduction/Adduction (with active assistance on LLE)   Ankle DF/PF (only on RLE secondary to history of L  footdrop)      AM-PAC 6 CLICK MOBILITY  How much help from another person does this patient currently need?   1 = Unable, Total/Dependent Assistance  2 = A lot, Maximum/Moderate Assistance  3 = A little, Minimum/Contact Guard/Supervision  4 = None, Modified [place]/Independent    Turning over in bed (including adjusting bedclothes, sheets and blankets)?: 2  Sitting down on and standing up from a chair with arms (e.g., wheelchair, bedside commode, etc.): 3  Moving from lying on back to sitting on the side of the bed?: 2  Moving to and from a bed to a chair (including a wheelchair)?: 3  Need to walk in hospital room?: 2  Climbing 3-5 steps with a railing?: 1  Total Score: 13    AM-PAC Raw Score CMS G-Code Modifier Level of Impairment Assistance   6 % Total / Unable   7 - 9 CM 80 - 100% Maximal Assist   10 - 14 CL 60 - 80% Moderate Assist   15 - 19 CK 40 - 60% Moderate Assist   20 - 22 CJ 20 - 40% Minimal Assist   23 CI 1-20% SBA / CGA   24 CH 0% Independent/ Mod I     Patient left up in chair with all lines intact, call button in reach, nurse Riya notified and private sitter present.    Assessment:  Pearl Woodard is a 76 y.o. female with a medical diagnosis of Closed left hip fracture and presents 4 days s/p L hip IMN. Pt with trouble initiating bed mobility, exercises, and gait training requiring constant verbal cues to continue moving. Pt able to ambulate 70ft overall requiring one seated break with minimum assistance using a rolling walker. Pt would continue to benefit from skilled PT services BID while in hospital.     Rehab identified problem list/impairments: Rehab identified problem list/impairments: weakness, impaired endurance, impaired self care skills, impaired balance, gait instability, impaired functional mobilty, decreased coordination, decreased lower extremity function, pain, orthopedic precautions, impaired coordination, decreased safety awareness, impaired cognition    Rehab  potential is fair+    Activity tolerance: Fair    Discharge recommendations: Discharge Facility/Level Of Care Needs: nursing facility, skilled, home health PT (SNF vs Home Health with 24 hour care.)     Barriers to discharge: Barriers to Discharge: None    Equipment recommendations: Equipment Needed After Discharge:  walker, rolling, Wheelchair  (pt owns bedside commode)    GOALS:    Physical Therapy Goals        Problem: Physical Therapy Goal    Goal Priority Disciplines Outcome Goal Variances Interventions   Physical Therapy Goal     PT/OT, PT Ongoing (interventions implemented as appropriate)     Description:  Goals to be met by: 2017    Patient will increase functional independence with mobility by performin. Supine to sit with Stand-by Assistance  2. Sit to stand transfer with Supervision  3. Gait  x 100 feet with Stand-by Assistance using Rolling Walker.   4. Lower extremity exercise program x20 reps per handout, with supervision  5. Bed to chair transfer with Stand-by Assistance using Rolling Walker.                        PLAN:    Patient to be seen BID  to address the above listed problems via gait training, therapeutic activities, therapeutic exercises  Plan of Care expires: 17  Plan of Care reviewed with: patient         TITO Santos  2017

## 2017-06-12 NOTE — PT/OT/SLP PROGRESS
Occupational Therapy  Treatment    Pearl Woodard   MRN: [id number]   Admitting Diagnosis: Closed left hip fracture    OT Date of Treatment: 06/12/17   OT Start Time: 1526  OT Stop Time: 1601  OT Total Time (min): 35 min    Billable Minutes:  Self Care/Home Management 20, Therapeutic Exercise 15 and Total Time 35    General Precautions: Standard, fall  Orthopedic Precautions: LLE weight bearing as tolerated  Braces: AFO (LLE)         Subjective:  Communicated with patient and son  prior to session.    Pain/Comfort  Pain Rating 1: 0/10    Objective:  Patient found with: peripheral IV     Functional Mobility:  Bed Mobility:  Scooting/Bridging: Minimum Assistance  Supine to Sit: Moderate Assistance, Minimum Assistance, WIth side rail, With leg lift  Sit to Supine: Maximum Assistance, With leg lift    Transfers:   Sit <> Stand Assistance: Minimum Assistance  Sit <> Stand Assistive Device: Rolling Walker    Functional Ambulation: Patient side stepped 4-5 stepa with min assist using a RW    Activities of Daily Living:  UE Dressing Level of Assistance: Moderate assistance (to don/doff gown while seated EOB)  LE Dressing Level of Assistance: Total assistance    Balance:   Static Sit: FAIR+: Able to take MINIMAL challenges from all directions  Dynamic Sit: FAIR+: Maintains balance through MINIMAL excursions of active trunk motion  Static Stand: FAIR: Maintains without assist but unable to take challenges  Dynamic stand: POOR: N/A    Therapeutic Activities and Exercises:  Pt was educated in use of theraband for UE therex. Pt was able to perform UE therex using red theraband. Pt was able to perform B shldr horiz abd x10, B shldr flex x10 and B elbow ext x10 reps. Pt was able to perform after demo and rest between reps. Patient required min assist to perform therex while seated EOB.    AM-PAC 6 CLICK ADL   How much help from another person does this patient currently need?   1 = Unable, Total/Dependent Assistance  2 = A  "lot, Maximum/Moderate Assistance  3 = A little, Minimum/Contact Guard/Supervision  4 = None, Modified [place]/Independent    Putting on and taking off regular lower body clothing? : 1  Bathing (including washing, rinsing, drying)?: 2  Toileting, which includes using toilet, bedpan, or urinal? : 1  Putting on and taking off regular upper body clothing?: 3  Taking care of personal grooming such as brushing teeth?: 3  Eating meals?: 3  Total Score: 13     AM-PAC Raw Score CMS "G-Code Modifier Level of Impairment Assistance   6 % Total / Unable   7 - 8 CM 80 - 100% Maximal Assist   9-13 CL 60 - 80% Moderate Assist   14 - 19 CK 40 - 60% Moderate Assist   20 - 22 CJ 20 - 40% Minimal Assist   23 CI 1-20% SBA / CGA   24 CH 0% Independent/ Mod I       Patient left supine with all lines intact, call button in reach, bed alarm on, PCT notified and son present  (PCT notified of the patient need to to be changed    ASSESSMENT:  Patient tolerated OT with increased time needed to perform all tasks.    Rehab identified problem list/impairments: Rehab identified problem list/impairments: weakness, impaired endurance, impaired self care skills, impaired balance, gait instability, impaired functional mobilty, impaired cognition, decreased lower extremity function, orthopedic precautions, impaired skin, decreased safety awareness    Rehab potential is fair.    Activity tolerance: Good    Discharge recommendations: Discharge Facility/Level Of Care Needs: nursing facility, skilled (If home, patient will need 24* care and  OT)     Barriers to discharge: Barriers to Discharge: None    Equipment recommendations: bedside commode, walker, rolling     GOALS:    Occupational Therapy Goals        Problem: Occupational Therapy Goal    Goal Priority Disciplines Outcome Interventions   Occupational Therapy Goal     OT, PT/OT Ongoing (interventions implemented as appropriate)    Description:  Goals to be met by: 06/30/17     Patient " will increase functional independence with ADLs by performing:    UE Dressing with Set-up Assistance.  Grooming while standing at sink with Contact Guard Assistance.  Toileting from bedside commode with Minimal Assistance for hygiene and clothing management.   Toilet transfer to bedside commode with Contact Guard Assistance.  Upper extremity exercise program x 10 reps with assistance as needed.  Met 6/12/17                       Plan:  Patient to be seen 5 x/week to address the above listed problems via self-care/home management, therapeutic activities, therapeutic exercises  Plan of Care expires: 06/30/17  Plan of Care reviewed with: patient, son         Marquita Strangeean, OT  06/12/2017

## 2017-06-12 NOTE — PLAN OF CARE
Problem: Occupational Therapy Goal  Goal: Occupational Therapy Goal  Goals to be met by: 06/30/17     Patient will increase functional independence with ADLs by performing:    UE Dressing with Set-up Assistance.  Grooming while standing at sink with Contact Guard Assistance.  Toileting from bedside commode with Minimal Assistance for hygiene and clothing management.   Toilet transfer to bedside commode with Contact Guard Assistance.  Upper extremity exercise program x 10 reps with assistance as needed.  Met 6/12/17      Outcome: Ongoing (interventions implemented as appropriate)  Patient will benefit from OT to address functional deficits.

## 2017-06-12 NOTE — SUBJECTIVE & OBJECTIVE
Interval History: Doing well. No complaints. Pain controlled.     Review of Systems   Constitutional: Negative for activity change.   Respiratory: Negative for chest tightness and shortness of breath.    Cardiovascular: Negative for chest pain.   Gastrointestinal: Negative for abdominal pain.     Objective:     Vital Signs (Most Recent):  Temp: 98.1 °F (36.7 °C) (06/12/17 0755)  Pulse: 88 (06/12/17 0755)  Resp: 17 (06/12/17 0755)  BP: (!) 144/80 (06/12/17 0755)  SpO2: 100 % (06/12/17 0755) Vital Signs (24h Range):  Temp:  [97.2 °F (36.2 °C)-99 °F (37.2 °C)] 98.1 °F (36.7 °C)  Pulse:  [] 88  Resp:  [17-20] 17  SpO2:  [83 %-100 %] 100 %  BP: (126-191)/(59-90) 144/80     Weight: 60.1 kg (132 lb 7.9 oz)  Body mass index is 21.71 kg/m².    Intake/Output Summary (Last 24 hours) at 06/12/17 0933  Last data filed at 06/12/17 0000   Gross per 24 hour   Intake              720 ml   Output                0 ml   Net              720 ml      Physical Exam   Constitutional: She is oriented to person, place, and time. She appears well-developed and well-nourished.   HENT:   Head: Normocephalic and atraumatic.   Cardiovascular: Normal rate.    Pulmonary/Chest: Effort normal.   Abdominal: Soft.   Neurological: She is alert and oriented to person, place, and time.     Interval History: Doing well. No complaints. Pain controlled.     Review of Systems   Constitutional: Negative for activity change.   Respiratory: Negative for chest tightness and shortness of breath.    Cardiovascular: Negative for chest pain.   Gastrointestinal: Negative for abdominal pain.     Objective:     Vital Signs (Most Recent):  Temp: 98.1 °F (36.7 °C) (06/12/17 0755)  Pulse: 88 (06/12/17 0755)  Resp: 17 (06/12/17 0755)  BP: (!) 144/80 (06/12/17 0755)  SpO2: 100 % (06/12/17 0755) Vital Signs (24h Range):  Temp:  [97.2 °F (36.2 °C)-99 °F (37.2 °C)] 98.1 °F (36.7 °C)  Pulse:  [] 88  Resp:  [17-20] 17  SpO2:  [83 %-100 %] 100 %  BP: (126-191)/(59-90)  144/80     Weight: 60.1 kg (132 lb 7.9 oz)  Body mass index is 21.71 kg/m².    Intake/Output Summary (Last 24 hours) at 06/12/17 0933  Last data filed at 06/12/17 0000   Gross per 24 hour   Intake              720 ml   Output                0 ml   Net              720 ml      Physical Exam   Constitutional: She is oriented to person, place, and time. She appears well-developed and well-nourished.   HENT:   Head: Normocephalic and atraumatic.   Cardiovascular: Normal rate.    Pulmonary/Chest: Effort normal.   Abdominal: Soft.   Neurological: She is alert and oriented to person, place, and time.     Interval History: Doing well. No complaints. Pain controlled.     Review of Systems   Constitutional: Negative for activity change.   Respiratory: Negative for chest tightness and shortness of breath.    Cardiovascular: Negative for chest pain.   Gastrointestinal: Negative for abdominal pain.     Objective:     Vital Signs (Most Recent):  Temp: 98.1 °F (36.7 °C) (06/12/17 0755)  Pulse: 88 (06/12/17 0755)  Resp: 17 (06/12/17 0755)  BP: (!) 144/80 (06/12/17 0755)  SpO2: 100 % (06/12/17 0755) Vital Signs (24h Range):  Temp:  [97.2 °F (36.2 °C)-99 °F (37.2 °C)] 98.1 °F (36.7 °C)  Pulse:  [] 88  Resp:  [17-20] 17  SpO2:  [83 %-100 %] 100 %  BP: (126-191)/(59-90) 144/80     Weight: 60.1 kg (132 lb 7.9 oz)  Body mass index is 21.71 kg/m².    Intake/Output Summary (Last 24 hours) at 06/12/17 0933  Last data filed at 06/12/17 0000   Gross per 24 hour   Intake              720 ml   Output                0 ml   Net              720 ml      Physical Exam   Constitutional: She is oriented to person, place, and time. She appears well-developed and well-nourished.   HENT:   Head: Normocephalic and atraumatic.   Cardiovascular: Normal rate.    Pulmonary/Chest: Effort normal.   Abdominal: Soft.   Neurological: She is alert and oriented to person, place, and time.     Interval History: Doing well. No complaints. Pain controlled.      Review of Systems   Constitutional: Negative for activity change.   Respiratory: Negative for chest tightness and shortness of breath.    Cardiovascular: Negative for chest pain.   Gastrointestinal: Negative for abdominal pain.     Objective:     Vital Signs (Most Recent):  Temp: 98.1 °F (36.7 °C) (06/12/17 0755)  Pulse: 88 (06/12/17 0755)  Resp: 17 (06/12/17 0755)  BP: (!) 144/80 (06/12/17 0755)  SpO2: 100 % (06/12/17 0755) Vital Signs (24h Range):  Temp:  [97.2 °F (36.2 °C)-99 °F (37.2 °C)] 98.1 °F (36.7 °C)  Pulse:  [] 88  Resp:  [17-20] 17  SpO2:  [83 %-100 %] 100 %  BP: (126-191)/(59-90) 144/80     Weight: 60.1 kg (132 lb 7.9 oz)  Body mass index is 21.71 kg/m².    Intake/Output Summary (Last 24 hours) at 06/12/17 0933  Last data filed at 06/12/17 0000   Gross per 24 hour   Intake              720 ml   Output                0 ml   Net              720 ml      Physical Exam   Constitutional: She is oriented to person, place, and time. She appears well-developed and well-nourished.   HENT:   Head: Normocephalic and atraumatic.   Cardiovascular: Normal rate.    Pulmonary/Chest: Effort normal.   Abdominal: Soft.   Neurological: She is alert and oriented to person, place, and time.       Significant Labs:   BMP:     Recent Labs  Lab 06/11/17  0332   GLU 98      K 4.0      CO2 19*   BUN 10   CREATININE 0.5   CALCIUM 8.4*     CBC:     Recent Labs  Lab 06/10/17  2238 06/11/17  0332   WBC 9.05  --    HGB 10.2* 10.7*   HCT 30.5* 31.6*   *  --        Magnesium:  No results for input(s): MG in the last 48 hours.    Significant Imaging:    Significant Labs:   BMP:     Recent Labs  Lab 06/11/17  0332   GLU 98      K 4.0      CO2 19*   BUN 10   CREATININE 0.5   CALCIUM 8.4*     CBC:     Recent Labs  Lab 06/10/17  2238 06/11/17  0332   WBC 9.05  --    HGB 10.2* 10.7*   HCT 30.5* 31.6*   *  --        Magnesium:  No results for input(s): MG in the last 48 hours.    Significant  Imaging:  Interval History: Doing well. No complaints. Pain controlled.     Review of Systems   Constitutional: Negative for activity change.   Respiratory: Negative for chest tightness and shortness of breath.    Cardiovascular: Negative for chest pain.   Gastrointestinal: Negative for abdominal pain.     Objective:     Vital Signs (Most Recent):  Temp: 98.1 °F (36.7 °C) (06/12/17 0755)  Pulse: 88 (06/12/17 0755)  Resp: 17 (06/12/17 0755)  BP: (!) 144/80 (06/12/17 0755)  SpO2: 100 % (06/12/17 0755) Vital Signs (24h Range):  Temp:  [97.2 °F (36.2 °C)-99 °F (37.2 °C)] 98.1 °F (36.7 °C)  Pulse:  [] 88  Resp:  [17-20] 17  SpO2:  [83 %-100 %] 100 %  BP: (126-191)/(59-90) 144/80     Weight: 60.1 kg (132 lb 7.9 oz)  Body mass index is 21.71 kg/m².    Intake/Output Summary (Last 24 hours) at 06/12/17 0933  Last data filed at 06/12/17 0000   Gross per 24 hour   Intake              720 ml   Output                0 ml   Net              720 ml      Physical Exam   Constitutional: She is oriented to person, place, and time. She appears well-developed and well-nourished.   HENT:   Head: Normocephalic and atraumatic.   Cardiovascular: Normal rate.    Pulmonary/Chest: Effort normal.   Abdominal: Soft.   Neurological: She is alert and oriented to person, place, and time.       Significant Labs:   BMP:     Recent Labs  Lab 06/11/17 0332   GLU 98      K 4.0      CO2 19*   BUN 10   CREATININE 0.5   CALCIUM 8.4*     CBC:     Recent Labs  Lab 06/10/17  2238 06/11/17 0332   WBC 9.05  --    HGB 10.2* 10.7*   HCT 30.5* 31.6*   *  --        Magnesium:  No results for input(s): MG in the last 48 hours.    Significant Imaging:    Significant Labs:   BMP:     Recent Labs  Lab 06/11/17 0332   GLU 98      K 4.0      CO2 19*   BUN 10   CREATININE 0.5   CALCIUM 8.4*     CBC:     Recent Labs  Lab 06/10/17  2238 06/11/17  0332   WBC 9.05  --    HGB 10.2* 10.7*   HCT 30.5* 31.6*   *  --         Magnesium:  No results for input(s): MG in the last 48 hours.    Significant Imaging:  Interval History: Doing well. No complaints. Pain controlled.     Review of Systems   Constitutional: Negative for activity change.   Respiratory: Negative for chest tightness and shortness of breath.    Cardiovascular: Negative for chest pain.   Gastrointestinal: Negative for abdominal pain.     Objective:     Vital Signs (Most Recent):  Temp: 98.1 °F (36.7 °C) (06/12/17 0755)  Pulse: 88 (06/12/17 0755)  Resp: 17 (06/12/17 0755)  BP: (!) 144/80 (06/12/17 0755)  SpO2: 100 % (06/12/17 0755) Vital Signs (24h Range):  Temp:  [97.2 °F (36.2 °C)-99 °F (37.2 °C)] 98.1 °F (36.7 °C)  Pulse:  [] 88  Resp:  [17-20] 17  SpO2:  [83 %-100 %] 100 %  BP: (126-191)/(59-90) 144/80     Weight: 60.1 kg (132 lb 7.9 oz)  Body mass index is 21.71 kg/m².    Intake/Output Summary (Last 24 hours) at 06/12/17 0933  Last data filed at 06/12/17 0000   Gross per 24 hour   Intake              720 ml   Output                0 ml   Net              720 ml      Physical Exam   Constitutional: She is oriented to person, place, and time. She appears well-developed and well-nourished.   HENT:   Head: Normocephalic and atraumatic.   Cardiovascular: Normal rate.    Pulmonary/Chest: Effort normal.   Abdominal: Soft.   Neurological: She is alert and oriented to person, place, and time.     Interval History: Doing well. No complaints. Pain controlled.     Review of Systems   Constitutional: Negative for activity change.   Respiratory: Negative for chest tightness and shortness of breath.    Cardiovascular: Negative for chest pain.   Gastrointestinal: Negative for abdominal pain.     Objective:     Vital Signs (Most Recent):  Temp: 98.1 °F (36.7 °C) (06/12/17 0755)  Pulse: 88 (06/12/17 0755)  Resp: 17 (06/12/17 0755)  BP: (!) 144/80 (06/12/17 0755)  SpO2: 100 % (06/12/17 0755) Vital Signs (24h Range):  Temp:  [97.2 °F (36.2 °C)-99 °F (37.2 °C)] 98.1 °F (36.7  °C)  Pulse:  [] 88  Resp:  [17-20] 17  SpO2:  [83 %-100 %] 100 %  BP: (126-191)/(59-90) 144/80     Weight: 60.1 kg (132 lb 7.9 oz)  Body mass index is 21.71 kg/m².    Intake/Output Summary (Last 24 hours) at 06/12/17 0933  Last data filed at 06/12/17 0000   Gross per 24 hour   Intake              720 ml   Output                0 ml   Net              720 ml      Physical Exam   Constitutional: She is oriented to person, place, and time. She appears well-developed and well-nourished.   HENT:   Head: Normocephalic and atraumatic.   Cardiovascular: Normal rate.    Pulmonary/Chest: Effort normal.   Abdominal: Soft.   Neurological: She is alert and oriented to person, place, and time.       Significant Labs:   BMP:     Recent Labs  Lab 06/11/17  0332   GLU 98      K 4.0      CO2 19*   BUN 10   CREATININE 0.5   CALCIUM 8.4*     CBC:     Recent Labs  Lab 06/10/17  2238 06/11/17  0332   WBC 9.05  --    HGB 10.2* 10.7*   HCT 30.5* 31.6*   *  --        Magnesium:  No results for input(s): MG in the last 48 hours.    Significant Imaging:    Significant Labs:   BMP:     Recent Labs  Lab 06/11/17  0332   GLU 98      K 4.0      CO2 19*   BUN 10   CREATININE 0.5   CALCIUM 8.4*     CBC:     Recent Labs  Lab 06/10/17  2238 06/11/17  0332   WBC 9.05  --    HGB 10.2* 10.7*   HCT 30.5* 31.6*   *  --        Magnesium:  No results for input(s): MG in the last 48 hours.    Significant Imaging:  Interval History: Doing well. No complaints. Pain controlled.     Review of Systems   Constitutional: Negative for activity change.   Respiratory: Negative for chest tightness and shortness of breath.    Cardiovascular: Negative for chest pain.   Gastrointestinal: Negative for abdominal pain.     Objective:     Vital Signs (Most Recent):  Temp: 98.1 °F (36.7 °C) (06/12/17 0755)  Pulse: 88 (06/12/17 0755)  Resp: 17 (06/12/17 0755)  BP: (!) 144/80 (06/12/17 0755)  SpO2: 100 % (06/12/17 0755) Vital Signs  (24h Range):  Temp:  [97.2 °F (36.2 °C)-99 °F (37.2 °C)] 98.1 °F (36.7 °C)  Pulse:  [] 88  Resp:  [17-20] 17  SpO2:  [83 %-100 %] 100 %  BP: (126-191)/(59-90) 144/80     Weight: 60.1 kg (132 lb 7.9 oz)  Body mass index is 21.71 kg/m².    Intake/Output Summary (Last 24 hours) at 06/12/17 0933  Last data filed at 06/12/17 0000   Gross per 24 hour   Intake              720 ml   Output                0 ml   Net              720 ml      Physical Exam   Constitutional: She is oriented to person, place, and time. She appears well-developed and well-nourished.   HENT:   Head: Normocephalic and atraumatic.   Cardiovascular: Normal rate.    Pulmonary/Chest: Effort normal.   Abdominal: Soft.   Neurological: She is alert and oriented to person, place, and time.       Significant Labs:   BMP:     Recent Labs  Lab 06/11/17  0332   GLU 98      K 4.0      CO2 19*   BUN 10   CREATININE 0.5   CALCIUM 8.4*     CBC:     Recent Labs  Lab 06/10/17  2238 06/11/17  0332   WBC 9.05  --    HGB 10.2* 10.7*   HCT 30.5* 31.6*   *  --        Magnesium:  No results for input(s): MG in the last 48 hours.    Significant Imaging:    Significant Labs:   BMP:     Recent Labs  Lab 06/11/17  0332   GLU 98      K 4.0      CO2 19*   BUN 10   CREATININE 0.5   CALCIUM 8.4*     CBC:     Recent Labs  Lab 06/10/17  2238 06/11/17  0332   WBC 9.05  --    HGB 10.2* 10.7*   HCT 30.5* 31.6*   *  --        Magnesium:  No results for input(s): MG in the last 48 hours.    Significant Imaging:

## 2017-06-12 NOTE — PT/OT/SLP PROGRESS
Physical Therapy  PM Treatment    Pearl Woodard   MRN: [id number]   Admitting Diagnosis: Closed left hip fracture    PT Received On: 06/12/17  PT Start Time: 1623     PT Stop Time: 1652    PT Total Time (min): 29 min       Billable Minutes:  Gait [id number]    Treatment Type: Treatment  PT/PTA: PT     PTA Visit Number: 0       General Precautions: Standard, fall  Orthopedic Precautions: LLE weight bearing as tolerated   Braces: AFO (Left )     Subjective:  Pt willing to participate in PT services.     Pain/Comfort  Pain Rating 1: 0/10  Location - Side 1: Left  Location 1: hip  Pain Addressed 1: Pre-medicate for activity  Pain Rating Post-Intervention 1:  (Pt reported that she had very little pain but did not rate with a number. )    Objective:   Patient found with: peripheral IV    Functional Mobility:  Bed Mobility:   Rolling/Turning Right: Moderate assistance  Scooting/Bridging: Minimum Assistance  Supine to Sit: Moderate Assistance  Sit to Supine: Moderate Assistance, With leg lift, With assist of  2    Transfers:  Sit <> Stand Assistance: Minimum Assistance  Sit <> Stand Assistive Device: Rolling Walker    Gait:   Gait Distance: 50ftx2 with a seated break (Pt required constant verbal cues to continue advancing BLE and rolling walker. Pt also required active assistance to navigate rolling walker. )  Assistance 1: Minimum assistance  Gait Assistive Device: Rolling walker  Gait Pattern: reciprocal  Gait Deviation(s): decreased caroline, increased time in double stance, decreased velocity of limb motion, decreased step length, decreased stride length, decreased toe-to-floor clearance    Balance:   Static Sit: FAIR: Maintains without assist, but unable to take any challenges   Static Stand: FAIR: Maintains without assist but unable to take challenges  Dynamic stand: POOR: Needs Minimum Assistance during gait    AM-PAC 6 CLICK MOBILITY  How much help from another person does this patient currently need?   1 =  Unable, Total/Dependent Assistance  2 = A lot, Maximum/Moderate Assistance  3 = A little, Minimum/Contact Guard/Supervision  4 = None, Modified [place]/Independent    Turning over in bed (including adjusting bedclothes, sheets and blankets)?: 2  Sitting down on and standing up from a chair with arms (e.g., wheelchair, bedside commode, etc.): 3  Moving from lying on back to sitting on the side of the bed?: 2  Moving to and from a bed to a chair (including a wheelchair)?: 2  Need to walk in hospital room?: 2  Climbing 3-5 steps with a railing?: 1  Total Score: 12    AM-PAC Raw Score CMS G-Code Modifier Level of Impairment Assistance   6 % Total / Unable   7 - 9 CM 80 - 100% Maximal Assist   10 - 14 CL 60 - 80% Moderate Assist   15 - 19 CK 40 - 60% Moderate Assist   20 - 22 CJ 20 - 40% Minimal Assist   23 CI 1-20% SBA / CGA   24 CH 0% Independent/ Mod I     Patient left HOB elevated with all lines intact, call button in reach, nurse notified and son present.    Assessment:  Pearl Woodard is a 76 y.o. female with a medical diagnosis of Closed left hip fracture and presents 4 days s/p L hip IMN. Pt progressing with gait distance and still requiring constant verbal cues to initiate movement. Pt would continue to benefit from skilled PT services BID while in the hospital.     Rehab identified problem list/impairments: Rehab identified problem list/impairments: weakness, impaired endurance, impaired self care skills, impaired functional mobilty, gait instability, impaired balance, pain, decreased coordination, decreased lower extremity function, impaired cognition, decreased safety awareness, orthopedic precautions    Rehab potential is fair+    Activity tolerance: Fair    Discharge recommendations: Discharge Facility/Level Of Care Needs: home health PT, nursing facility, skilled     Barriers to discharge: Barriers to Discharge: None    Equipment recommendations: Equipment Needed After Discharge:  walker, rolling, wheelchair     GOALS:    Physical Therapy Goals        Problem: Physical Therapy Goal    Goal Priority Disciplines Outcome Goal Variances Interventions   Physical Therapy Goal     PT/OT, PT Ongoing (interventions implemented as appropriate)     Description:  Goals to be met by: 2017    Patient will increase functional independence with mobility by performin. Supine to sit with Stand-by Assistance  2. Sit to stand transfer with Supervision  3. Gait  x 100 feet with Stand-by Assistance using Rolling Walker.   4. Lower extremity exercise program x20 reps per handout, with supervision  5. Bed to chair transfer with Stand-by Assistance using Rolling Walker.                        PLAN:    Patient to be seen BID  to address the above listed problems via gait training, therapeutic activities, therapeutic exercises  Plan of Care expires: 17  Plan of Care reviewed with: patient, juan Godfrey, TITO  2017

## 2017-06-12 NOTE — UM SECONDARY REVIEW
[facility]    Placement Issue    Hospital Course:  77 yo Female  Patient admitted to the hospital for eval and treatment of L hip fracture after mechanical fall.  Ortho was consulted and the patient underwent repair on 6/8.  was consulted for SNF placement.  PT/OT were consulted and recommend SNF.    Awaiting SNF placement. Spoke with  this am.   Likely Tuesday.    Approved Inpatient     Approved

## 2017-06-12 NOTE — PLAN OF CARE
Problem: Patient Care Overview  Goal: Plan of Care Review   06/12/17 4083   Coping/Psychosocial   Plan Of Care Reviewed With patient;son   No significant change present.Denies pain. Turned every 2 hours and prn. Dressing to left hip clean and dry. History of falls. Fall precautions reviewed

## 2017-06-12 NOTE — PROGRESS NOTES
Ortho Daily Progress Note    Pearl Woodard is a 76 y.o. female admitted on 6/7/2017      Chief Complaint/Reason for admission: Hip Pain (Pt. presents from home after fall. Per family report, pt. was reaching for walker while she fell. EMS personnel state that patient has shortening and rotation of the left leg. Pt. awake and alert. )       Hospital Day: 5  Post Op Day: 4 Days Post-Op     The patient was seen and examined this morning at the bedside. Patient reports no acute issues overnight.  Patient reports that pain is adequately controlled.    _______________    Vitals:    06/12/17 0033 06/12/17 0639 06/12/17 0755 06/12/17 1645   BP: (!) 191/78 (!) 190/70 (!) 144/80 (!) 140/66   Pulse: 100 98 88 80   Resp: 18 18 17 18   Temp: 97.2 °F (36.2 °C) 97.7 °F (36.5 °C) 98.1 °F (36.7 °C) 97.6 °F (36.4 °C)   TempSrc:   Oral Oral   SpO2: 95% 95% 100% 100%   Weight:       Height:           Vital Signs (Most Recent)  Temp: 97.6 °F (36.4 °C) (06/12/17 1645)  Pulse: 80 (06/12/17 1645)  Resp: 18 (06/12/17 1645)  BP: (!) 140/66 (06/12/17 1645)  SpO2: 100 % (06/12/17 1645)    Vital Signs Range (Last 24H):  Temp:  [97.2 °F (36.2 °C)-99 °F (37.2 °C)]   Pulse:  []   Resp:  [17-18]   BP: (140-191)/(66-90)   SpO2:  [94 %-100 %]       Physical:    AAOx3  Incision/ dressing clean/dry/intact  NVI Distally  Palpable distal pulses  CR<3sec      Recent Labs      06/10/17   0418  06/10/17   2238  06/11/17   0332   K  3.8   --   4.0   CALCIUM  8.0*   --   8.4*   WBC   --   9.05   --    HGB  7.4*  10.2*  10.7*   HCT  22.3*  30.5*  31.6*   PLT   --   112*   --        I/O last 3 completed shifts:  In: 1608.3 [P.O.:1200; I.V.:100; Blood:308.3]  Out: -           Assessment:  A/P POD 4 s/p left          Hip IMN     Plan:    PT/OT: WBAT  Pain Control  DVT Prophylaxis: lovenox    Discharge Plan: likely home or SNF        Haroldo Mohan MD  [facility]

## 2017-06-12 NOTE — PROGRESS NOTES
CHASITY received call back from Laurie LANCE @ [facility] advising that patient's information received and that she would f/u with SW in a.m. to give  further directives.  Karrie Akhtar LMSW, ROMAN-CHASITY, [facility]  06/12/2017

## 2017-06-12 NOTE — PLAN OF CARE
Problem: Fall Risk (Adult)  Goal: Identify Related Risk Factors and Signs and Symptoms  Related risk factors and signs and symptoms are identified upon initiation of Human Response Clinical Practice Guideline (CPG)   Outcome: Ongoing (interventions implemented as appropriate)   06/12/17 0315   Fall Risk   Related Risk Factors (Fall Risk) environment unfamiliar;neuro disease/injury;history of falls;gait/mobility problems;bladder function altered   Signs and Symptoms (Fall Risk) presence of risk factors     Goal: Absence of Falls  Patient will demonstrate the desired outcomes by discharge/transition of care.   Outcome: Ongoing (interventions implemented as appropriate)   06/12/17 0315   Fall Risk (Adult)   Absence of Falls making progress toward outcome       Problem: Pressure Ulcer Risk (Vinnie Scale) (Adult,Obstetrics,Pediatric)  Goal: Identify Related Risk Factors and Signs and Symptoms  Related risk factors and signs and symptoms are identified upon initiation of Human Response Clinical Practice Guideline (CPG)   Outcome: Ongoing (interventions implemented as appropriate)   06/12/17 0315   Pressure Ulcer Risk (Vinnie Scale)   Related Risk Factors (Pressure Ulcer Risk (Vinnie Scale)) skeletal deformities;mobility impaired     Goal: Skin Integrity  Patient will demonstrate the desired outcomes by discharge/transition of care.   Outcome: Ongoing (interventions implemented as appropriate)   06/12/17 0315   Pressure Ulcer Risk (Vinnie Scale) (Adult,Obstetrics,Pediatric)   Skin Integrity making progress toward outcome       Problem: Fractured Hip (Adult)  Goal: Signs and Symptoms of Listed Potential Problems Will be Absent, Minimized or Managed (Fractured Hip)  Signs and symptoms of listed potential problems will be absent, minimized or managed by discharge/transition of care (reference Fractured Hip (Adult) CPG).   Outcome: Ongoing (interventions implemented as appropriate)   06/12/17 0315   Fractured Hip   Problems  Assessed (Fractured Hip) all   Problems Present (Fractured Hip) none       Problem: Infection, Risk/Actual (Adult)  Goal: Identify Related Risk Factors and Signs and Symptoms  Related risk factors and signs and symptoms are identified upon initiation of Human Response Clinical Practice Guideline (CPG)   Outcome: Ongoing (interventions implemented as appropriate)   06/12/17 0315   Infection, Risk/Actual   Related Risk Factors (Infection, Risk/Actual) surgery/procedure;skin integrity impairment;trauma injury   Signs and Symptoms (Infection, Risk/Actual) pain;weakness     Goal: Infection Prevention/Resolution  Patient will demonstrate the desired outcomes by discharge/transition of care.   Outcome: Ongoing (interventions implemented as appropriate)   06/12/17 0315   Infection, Risk/Actual (Adult)   Infection Prevention/Resolution making progress toward outcome

## 2017-06-12 NOTE — PROGRESS NOTES
SW f/u with patient with son, Ousmane, at the bedside.  Discussed discharge plan of SNF.  Patient's son advised SW to f/u with patient's  Osmany.  SW f/u with patient's spouse via telephone to discuss SNF.  Patient's spouse stated that their only preferred provider was [facility].  SW clarified with patient's spouse that if no bed is available at [facility], the plan would be for patient to discharge to home with HH.  Patient's spouse confirmed and verbalized understanding.    Patient's choice form completed with via telephone contact with patient's spouse, Osmany.      Packet containing demographics, H&P, PT/OT notes, 142, PPD and PASRR sent to [facility] via [employer].  Awaiting response from [facility].    Karrie Akhtar LMSW, ROMAN-CHASITY, [facility]  06/12/2017

## 2017-06-12 NOTE — PROGRESS NOTES
[facility] Hospital Medicine  Progress Note    Patient Name: Pearl Woodard  MRN: [id number]  Patient Class: IP- Inpatient   Admission Date: 6/7/2017  Length of Stay: 5 days  Attending Physician: Matt Pacheco MD  Primary Care Provider: Jarad Daniels MD        Subjective:     Principal Problem:Closed left hip fracture    HPI:  Mrs. Pearl Woodard is a 76 y.o. female with essential hypertension, hypothyroidism (TSH unknown), rheumatoid arthritis, depression, and dementia who presents to [facility] ED with complaints of a fall today.  She was in her bathroom and was reaching for her walker tonight when she slipped and fell.  She cannot contribute meaningfully to her interview only to say that she is here for a fall and that she is nervous.  Further history is limited at this time.    Hospital Course:  Patient admitted to the hospital for eval and treatment of L hip fracture after mechanical fall.  Ortho was consulted and the patient underwent repair on 6/8.  was consulted for SNF placement.  PT/OT were consulted and recommend SNF.     Interval History: Doing well. No complaints. Pain controlled.     Review of Systems   Constitutional: Negative for activity change.   Respiratory: Negative for chest tightness and shortness of breath.    Cardiovascular: Negative for chest pain.   Gastrointestinal: Negative for abdominal pain.     Objective:     Vital Signs (Most Recent):  Temp: 98.1 °F (36.7 °C) (06/12/17 0755)  Pulse: 88 (06/12/17 0755)  Resp: 17 (06/12/17 0755)  BP: (!) 144/80 (06/12/17 0755)  SpO2: 100 % (06/12/17 0755) Vital Signs (24h Range):  Temp:  [97.2 °F (36.2 °C)-99 °F (37.2 °C)] 98.1 °F (36.7 °C)  Pulse:  [] 88  Resp:  [17-20] 17  SpO2:  [83 %-100 %] 100 %  BP: (126-191)/(59-90) 144/80     Weight: 60.1 kg (132 lb 7.9 oz)  Body mass index is 21.71 kg/m².    Intake/Output Summary (Last 24 hours) at 06/12/17 0933  Last data filed at 06/12/17 0000   Gross  per 24 hour   Intake              720 ml   Output                0 ml   Net              720 ml      Physical Exam   Constitutional: She is oriented to person, place, and time. She appears well-developed and well-nourished.   HENT:   Head: Normocephalic and atraumatic.   Cardiovascular: Normal rate.    Pulmonary/Chest: Effort normal.   Abdominal: Soft.   Neurological: She is alert and oriented to person, place, and time.     Interval History: Doing well. No complaints. Pain controlled.     Review of Systems   Constitutional: Negative for activity change.   Respiratory: Negative for chest tightness and shortness of breath.    Cardiovascular: Negative for chest pain.   Gastrointestinal: Negative for abdominal pain.     Objective:     Vital Signs (Most Recent):  Temp: 98.1 °F (36.7 °C) (06/12/17 0755)  Pulse: 88 (06/12/17 0755)  Resp: 17 (06/12/17 0755)  BP: (!) 144/80 (06/12/17 0755)  SpO2: 100 % (06/12/17 0755) Vital Signs (24h Range):  Temp:  [97.2 °F (36.2 °C)-99 °F (37.2 °C)] 98.1 °F (36.7 °C)  Pulse:  [] 88  Resp:  [17-20] 17  SpO2:  [83 %-100 %] 100 %  BP: (126-191)/(59-90) 144/80     Weight: 60.1 kg (132 lb 7.9 oz)  Body mass index is 21.71 kg/m².    Intake/Output Summary (Last 24 hours) at 06/12/17 0933  Last data filed at 06/12/17 0000   Gross per 24 hour   Intake              720 ml   Output                0 ml   Net              720 ml      Physical Exam   Constitutional: She is oriented to person, place, and time. She appears well-developed and well-nourished.   HENT:   Head: Normocephalic and atraumatic.   Cardiovascular: Normal rate.    Pulmonary/Chest: Effort normal.   Abdominal: Soft.   Neurological: She is alert and oriented to person, place, and time.     Interval History: Doing well. No complaints. Pain controlled.     Review of Systems   Constitutional: Negative for activity change.   Respiratory: Negative for chest tightness and shortness of breath.    Cardiovascular: Negative for chest  pain.   Gastrointestinal: Negative for abdominal pain.     Objective:     Vital Signs (Most Recent):  Temp: 98.1 °F (36.7 °C) (06/12/17 0755)  Pulse: 88 (06/12/17 0755)  Resp: 17 (06/12/17 0755)  BP: (!) 144/80 (06/12/17 0755)  SpO2: 100 % (06/12/17 0755) Vital Signs (24h Range):  Temp:  [97.2 °F (36.2 °C)-99 °F (37.2 °C)] 98.1 °F (36.7 °C)  Pulse:  [] 88  Resp:  [17-20] 17  SpO2:  [83 %-100 %] 100 %  BP: (126-191)/(59-90) 144/80     Weight: 60.1 kg (132 lb 7.9 oz)  Body mass index is 21.71 kg/m².    Intake/Output Summary (Last 24 hours) at 06/12/17 0933  Last data filed at 06/12/17 0000   Gross per 24 hour   Intake              720 ml   Output                0 ml   Net              720 ml      Physical Exam   Constitutional: She is oriented to person, place, and time. She appears well-developed and well-nourished.   HENT:   Head: Normocephalic and atraumatic.   Cardiovascular: Normal rate.    Pulmonary/Chest: Effort normal.   Abdominal: Soft.   Neurological: She is alert and oriented to person, place, and time.     Interval History: Doing well. No complaints. Pain controlled.     Review of Systems   Constitutional: Negative for activity change.   Respiratory: Negative for chest tightness and shortness of breath.    Cardiovascular: Negative for chest pain.   Gastrointestinal: Negative for abdominal pain.     Objective:     Vital Signs (Most Recent):  Temp: 98.1 °F (36.7 °C) (06/12/17 0755)  Pulse: 88 (06/12/17 0755)  Resp: 17 (06/12/17 0755)  BP: (!) 144/80 (06/12/17 0755)  SpO2: 100 % (06/12/17 0755) Vital Signs (24h Range):  Temp:  [97.2 °F (36.2 °C)-99 °F (37.2 °C)] 98.1 °F (36.7 °C)  Pulse:  [] 88  Resp:  [17-20] 17  SpO2:  [83 %-100 %] 100 %  BP: (126-191)/(59-90) 144/80     Weight: 60.1 kg (132 lb 7.9 oz)  Body mass index is 21.71 kg/m².    Intake/Output Summary (Last 24 hours) at 06/12/17 0933  Last data filed at 06/12/17 0000   Gross per 24 hour   Intake              720 ml   Output                 0 ml   Net              720 ml      Physical Exam   Constitutional: She is oriented to person, place, and time. She appears well-developed and well-nourished.   HENT:   Head: Normocephalic and atraumatic.   Cardiovascular: Normal rate.    Pulmonary/Chest: Effort normal.   Abdominal: Soft.   Neurological: She is alert and oriented to person, place, and time.       Significant Labs:   BMP:     Recent Labs  Lab 06/11/17 0332   GLU 98      K 4.0      CO2 19*   BUN 10   CREATININE 0.5   CALCIUM 8.4*     CBC:     Recent Labs  Lab 06/10/17  2238 06/11/17  0332   WBC 9.05  --    HGB 10.2* 10.7*   HCT 30.5* 31.6*   *  --        Magnesium:  No results for input(s): MG in the last 48 hours.    Significant Imaging:    Significant Labs:   BMP:     Recent Labs  Lab 06/11/17 0332   GLU 98      K 4.0      CO2 19*   BUN 10   CREATININE 0.5   CALCIUM 8.4*     CBC:     Recent Labs  Lab 06/10/17  2238 06/11/17  0332   WBC 9.05  --    HGB 10.2* 10.7*   HCT 30.5* 31.6*   *  --        Magnesium:  No results for input(s): MG in the last 48 hours.    Significant Imaging:  Interval History: Doing well. No complaints. Pain controlled.     Review of Systems   Constitutional: Negative for activity change.   Respiratory: Negative for chest tightness and shortness of breath.    Cardiovascular: Negative for chest pain.   Gastrointestinal: Negative for abdominal pain.     Objective:     Vital Signs (Most Recent):  Temp: 98.1 °F (36.7 °C) (06/12/17 0755)  Pulse: 88 (06/12/17 0755)  Resp: 17 (06/12/17 0755)  BP: (!) 144/80 (06/12/17 0755)  SpO2: 100 % (06/12/17 0755) Vital Signs (24h Range):  Temp:  [97.2 °F (36.2 °C)-99 °F (37.2 °C)] 98.1 °F (36.7 °C)  Pulse:  [] 88  Resp:  [17-20] 17  SpO2:  [83 %-100 %] 100 %  BP: (126-191)/(59-90) 144/80     Weight: 60.1 kg (132 lb 7.9 oz)  Body mass index is 21.71 kg/m².    Intake/Output Summary (Last 24 hours) at 06/12/17 0933  Last data filed at 06/12/17 0000    Gross per 24 hour   Intake              720 ml   Output                0 ml   Net              720 ml      Physical Exam   Constitutional: She is oriented to person, place, and time. She appears well-developed and well-nourished.   HENT:   Head: Normocephalic and atraumatic.   Cardiovascular: Normal rate.    Pulmonary/Chest: Effort normal.   Abdominal: Soft.   Neurological: She is alert and oriented to person, place, and time.       Significant Labs:   BMP:     Recent Labs  Lab 06/11/17 0332   GLU 98      K 4.0      CO2 19*   BUN 10   CREATININE 0.5   CALCIUM 8.4*     CBC:     Recent Labs  Lab 06/10/17  2238 06/11/17  0332   WBC 9.05  --    HGB 10.2* 10.7*   HCT 30.5* 31.6*   *  --        Magnesium:  No results for input(s): MG in the last 48 hours.    Significant Imaging:    Significant Labs:   BMP:     Recent Labs  Lab 06/11/17 0332   GLU 98      K 4.0      CO2 19*   BUN 10   CREATININE 0.5   CALCIUM 8.4*     CBC:     Recent Labs  Lab 06/10/17  2238 06/11/17  0332   WBC 9.05  --    HGB 10.2* 10.7*   HCT 30.5* 31.6*   *  --        Magnesium:  No results for input(s): MG in the last 48 hours.    Significant Imaging:  Interval History: Doing well. No complaints. Pain controlled.     Review of Systems   Constitutional: Negative for activity change.   Respiratory: Negative for chest tightness and shortness of breath.    Cardiovascular: Negative for chest pain.   Gastrointestinal: Negative for abdominal pain.     Objective:     Vital Signs (Most Recent):  Temp: 98.1 °F (36.7 °C) (06/12/17 0755)  Pulse: 88 (06/12/17 0755)  Resp: 17 (06/12/17 0755)  BP: (!) 144/80 (06/12/17 0755)  SpO2: 100 % (06/12/17 0755) Vital Signs (24h Range):  Temp:  [97.2 °F (36.2 °C)-99 °F (37.2 °C)] 98.1 °F (36.7 °C)  Pulse:  [] 88  Resp:  [17-20] 17  SpO2:  [83 %-100 %] 100 %  BP: (126-191)/(59-90) 144/80     Weight: 60.1 kg (132 lb 7.9 oz)  Body mass index is 21.71 kg/m².    Intake/Output Summary  (Last 24 hours) at 06/12/17 0933  Last data filed at 06/12/17 0000   Gross per 24 hour   Intake              720 ml   Output                0 ml   Net              720 ml      Physical Exam   Constitutional: She is oriented to person, place, and time. She appears well-developed and well-nourished.   HENT:   Head: Normocephalic and atraumatic.   Cardiovascular: Normal rate.    Pulmonary/Chest: Effort normal.   Abdominal: Soft.   Neurological: She is alert and oriented to person, place, and time.     Interval History: Doing well. No complaints. Pain controlled.     Review of Systems   Constitutional: Negative for activity change.   Respiratory: Negative for chest tightness and shortness of breath.    Cardiovascular: Negative for chest pain.   Gastrointestinal: Negative for abdominal pain.     Objective:     Vital Signs (Most Recent):  Temp: 98.1 °F (36.7 °C) (06/12/17 0755)  Pulse: 88 (06/12/17 0755)  Resp: 17 (06/12/17 0755)  BP: (!) 144/80 (06/12/17 0755)  SpO2: 100 % (06/12/17 0755) Vital Signs (24h Range):  Temp:  [97.2 °F (36.2 °C)-99 °F (37.2 °C)] 98.1 °F (36.7 °C)  Pulse:  [] 88  Resp:  [17-20] 17  SpO2:  [83 %-100 %] 100 %  BP: (126-191)/(59-90) 144/80     Weight: 60.1 kg (132 lb 7.9 oz)  Body mass index is 21.71 kg/m².    Intake/Output Summary (Last 24 hours) at 06/12/17 0933  Last data filed at 06/12/17 0000   Gross per 24 hour   Intake              720 ml   Output                0 ml   Net              720 ml      Physical Exam   Constitutional: She is oriented to person, place, and time. She appears well-developed and well-nourished.   HENT:   Head: Normocephalic and atraumatic.   Cardiovascular: Normal rate.    Pulmonary/Chest: Effort normal.   Abdominal: Soft.   Neurological: She is alert and oriented to person, place, and time.       Significant Labs:   BMP:     Recent Labs  Lab 06/11/17  0332   GLU 98      K 4.0      CO2 19*   BUN 10   CREATININE 0.5   CALCIUM 8.4*     CBC:     Recent  Labs  Lab 06/10/17  2238 06/11/17  0332   WBC 9.05  --    HGB 10.2* 10.7*   HCT 30.5* 31.6*   *  --        Magnesium:  No results for input(s): MG in the last 48 hours.    Significant Imaging:    Significant Labs:   BMP:     Recent Labs  Lab 06/11/17 0332   GLU 98      K 4.0      CO2 19*   BUN 10   CREATININE 0.5   CALCIUM 8.4*     CBC:     Recent Labs  Lab 06/10/17  2238 06/11/17  0332   WBC 9.05  --    HGB 10.2* 10.7*   HCT 30.5* 31.6*   *  --        Magnesium:  No results for input(s): MG in the last 48 hours.    Significant Imaging:  Interval History: Doing well. No complaints. Pain controlled.     Review of Systems   Constitutional: Negative for activity change.   Respiratory: Negative for chest tightness and shortness of breath.    Cardiovascular: Negative for chest pain.   Gastrointestinal: Negative for abdominal pain.     Objective:     Vital Signs (Most Recent):  Temp: 98.1 °F (36.7 °C) (06/12/17 0755)  Pulse: 88 (06/12/17 0755)  Resp: 17 (06/12/17 0755)  BP: (!) 144/80 (06/12/17 0755)  SpO2: 100 % (06/12/17 0755) Vital Signs (24h Range):  Temp:  [97.2 °F (36.2 °C)-99 °F (37.2 °C)] 98.1 °F (36.7 °C)  Pulse:  [] 88  Resp:  [17-20] 17  SpO2:  [83 %-100 %] 100 %  BP: (126-191)/(59-90) 144/80     Weight: 60.1 kg (132 lb 7.9 oz)  Body mass index is 21.71 kg/m².    Intake/Output Summary (Last 24 hours) at 06/12/17 0933  Last data filed at 06/12/17 0000   Gross per 24 hour   Intake              720 ml   Output                0 ml   Net              720 ml      Physical Exam   Constitutional: She is oriented to person, place, and time. She appears well-developed and well-nourished.   HENT:   Head: Normocephalic and atraumatic.   Cardiovascular: Normal rate.    Pulmonary/Chest: Effort normal.   Abdominal: Soft.   Neurological: She is alert and oriented to person, place, and time.       Significant Labs:   BMP:     Recent Labs  Lab 06/11/17  0332   GLU 98      K 4.0       CO2 19*   BUN 10   CREATININE 0.5   CALCIUM 8.4*     CBC:     Recent Labs  Lab 06/10/17  2238 06/11/17  0332   WBC 9.05  --    HGB 10.2* 10.7*   HCT 30.5* 31.6*   *  --        Magnesium:  No results for input(s): MG in the last 48 hours.    Significant Imaging:    Significant Labs:   BMP:     Recent Labs  Lab 06/11/17  0332   GLU 98      K 4.0      CO2 19*   BUN 10   CREATININE 0.5   CALCIUM 8.4*     CBC:     Recent Labs  Lab 06/10/17  2238 06/11/17  0332   WBC 9.05  --    HGB 10.2* 10.7*   HCT 30.5* 31.6*   *  --        Magnesium:  No results for input(s): MG in the last 48 hours.    Significant Imaging:    Assessment/Plan:      * Closed left hip fracture    S/p repair on 6/8.  Doing well. PT/OT rec. SNF  PPD placed 6/9        Malnutrition of moderate degree    Will discuss with patient           Acute blood loss anemia    As expected from surgery. Noted drop on 6/10. Will recheck at 12.  Received 2 units of blood.           Dementia without behavioral disturbance    Stable; will continue her home regimen of memantine.        Depression    Stable; will continue her home regimen of sertraline.        Essential hypertension    Patient's blood pressure is well-controlled; will continue home regimen of amlodipine, and provide as-needed clonidine.        Hypothyroidism    Will continue her home regimen of levothyroxine.        Rheumatoid arthritis    Stable; will continue her home regimen of methotrexate.          VTE Risk Mitigation         Ordered     enoxaparin injection 40 mg  Every 24 hours (non-standard times)     Route:  Subcutaneous        06/08/17 1642     Medium Risk of VTE  Once      06/08/17 1642        Awaiting SNF placement. Spoke with  this am.   Likely Tuesday.   Matt Fagan MD  Department of Hospital Medicine   [facility]

## 2017-06-12 NOTE — PLAN OF CARE
Problem: Physical Therapy Goal  Goal: Physical Therapy Goal  Goals to be met by: 2017    Patient will increase functional independence with mobility by performin. Supine to sit with Stand-by Assistance  2. Sit to stand transfer with Supervision  3. Gait  x 100 feet with Stand-by Assistance using Rolling Walker.   4. Lower extremity exercise program x20 reps per handout, with supervision  5. Bed to chair transfer with Stand-by Assistance using Rolling Walker.       Outcome: Ongoing (interventions implemented as appropriate)  Pt progressing with gait distance and still requiring constant verbal cues to initiate movement. Pt would continue to benefit from skilled PT services BID while in the hospital.

## 2017-06-13 PROBLEM — K59.01 SLOW TRANSIT CONSTIPATION: Status: ACTIVE | Noted: 2017-06-13

## 2017-06-13 PROCEDURE — 94799 UNLISTED PULMONARY SVC/PX: CPT

## 2017-06-13 PROCEDURE — 97116 GAIT TRAINING THERAPY: CPT

## 2017-06-13 PROCEDURE — 25000003 PHARM REV CODE 250: Performed by: INTERNAL MEDICINE

## 2017-06-13 PROCEDURE — 12000002 HC ACUTE/MED SURGE SEMI-PRIVATE ROOM

## 2017-06-13 PROCEDURE — 94761 N-INVAS EAR/PLS OXIMETRY MLT: CPT

## 2017-06-13 PROCEDURE — 63600175 PHARM REV CODE 636 W HCPCS: Performed by: ORTHOPAEDIC SURGERY

## 2017-06-13 PROCEDURE — 97110 THERAPEUTIC EXERCISES: CPT

## 2017-06-13 PROCEDURE — 25000003 PHARM REV CODE 250: Performed by: HOSPITALIST

## 2017-06-13 RX ORDER — AMLODIPINE BESYLATE 5 MG/1
10 TABLET ORAL DAILY
Status: DISCONTINUED | OUTPATIENT
Start: 2017-06-14 | End: 2017-06-14 | Stop reason: HOSPADM

## 2017-06-13 RX ORDER — LACTULOSE 10 G/15ML
30 SOLUTION ORAL DAILY PRN
Status: DISCONTINUED | OUTPATIENT
Start: 2017-06-14 | End: 2017-06-13

## 2017-06-13 RX ORDER — LACTULOSE 10 G/15ML
20 SOLUTION ORAL DAILY PRN
Status: DISCONTINUED | OUTPATIENT
Start: 2017-06-14 | End: 2017-06-14 | Stop reason: HOSPADM

## 2017-06-13 RX ADMIN — FOLIC ACID 1 MG: 1 TABLET ORAL at 09:06

## 2017-06-13 RX ADMIN — SERTRALINE HYDROCHLORIDE 25 MG: 25 TABLET ORAL at 09:06

## 2017-06-13 RX ADMIN — ENOXAPARIN SODIUM 40 MG: 100 INJECTION SUBCUTANEOUS at 06:06

## 2017-06-13 RX ADMIN — ACETAMINOPHEN 500 MG: 500 TABLET ORAL at 11:06

## 2017-06-13 RX ADMIN — LACTULOSE 20 G: 20 SOLUTION ORAL at 01:06

## 2017-06-13 RX ADMIN — MEMANTINE HYDROCHLORIDE 28 MG: 28 CAPSULE, EXTENDED RELEASE ORAL at 09:06

## 2017-06-13 RX ADMIN — ASPIRIN 81 MG: 81 TABLET, COATED ORAL at 09:06

## 2017-06-13 RX ADMIN — ACETAMINOPHEN 500 MG: 500 TABLET ORAL at 07:06

## 2017-06-13 RX ADMIN — LEVOTHYROXINE SODIUM 25 MCG: 25 TABLET ORAL at 06:06

## 2017-06-13 RX ADMIN — AMLODIPINE BESYLATE 5 MG: 5 TABLET ORAL at 09:06

## 2017-06-13 NOTE — PT/OT/SLP PROGRESS
Physical Therapy  AM Treatment    Pearl Woodard   MRN: [id number]   Admitting Diagnosis: Closed left hip fracture    PT Received On: 06/13/17  PT Start Time: 1036     PT Stop Time: 1106    PT Total Time (min): 30 min       Billable Minutes:  Gait [id number] and Therapeutic Exercise 15    Treatment Type: Treatment  PT/PTA: PT     PTA Visit Number: 0       General Precautions: Standard, fall  Orthopedic Precautions: LLE weight bearing as tolerated         Subjective:  Communicated with nurse Pimentel prior to session.    Pt willing to participate in PT services.     Pain/Comfort  Pain Rating 1: 5/10  Location - Side 1: Left  Location 1: hip  Pain Addressed 1:  (Pt denied need for pain medicine before PT, but requested for tylenol after PT. )    Objective:   Patient found with: peripheral IV    Functional Mobility:  Bed Mobility:   Rolling/Turning Right: Moderate assistance  Scooting/Bridging: Minimum Assistance  Supine to Sit: Moderate Assistance, With leg lift    Transfers:  Sit <> Stand Assistance: Minimum Assistance  Sit <> Stand Assistive Device: Rolling Walker    Gait:   Gait Distance: 100ft  (Pt required less verbal cues to continue advancing BLE today, but still required active assistance with pushing and navigating the rolling walker.)  Assistance 1: Minimum assistance  Gait Assistive Device: Rolling walker, L AFO  Gait Pattern: reciprocal  Gait Deviation(s): decreased caroline, increased time in double stance, decreased velocity of limb motion, decreased step length, decreased toe-to-floor clearance      Balance:   Static Sit: FAIR: Maintains without assist, but unable to take any challenges   Static Stand: FAIR: Maintains without assist but unable to take challenges  Dynamic stand:POOR: Needs Minimum Assistance during gate    Therapeutic Activities and Exercises:  Pt performed the following therex seated BLE x 10     Hip flexion (with active assistance on LLE)  Hip Abduction/Adduction (with active  assistance on LLE)  LAQ   Heel slides   Glute sets     AM-PAC 6 CLICK MOBILITY  How much help from another person does this patient currently need?   1 = Unable, Total/Dependent Assistance  2 = A lot, Maximum/Moderate Assistance  3 = A little, Minimum/Contact Guard/Supervision  4 = None, Modified [place]/Independent    Turning over in bed (including adjusting bedclothes, sheets and blankets)?: 2  Sitting down on and standing up from a chair with arms (e.g., wheelchair, bedside commode, etc.): 3  Moving from lying on back to sitting on the side of the bed?: 2  Moving to and from a bed to a chair (including a wheelchair)?: 2  Need to walk in hospital room?: 3  Climbing 3-5 steps with a railing?: 1  Total Score: 13    AM-PAC Raw Score CMS G-Code Modifier Level of Impairment Assistance   6 % Total / Unable   7 - 9 CM 80 - 100% Maximal Assist   10 - 14 CL 60 - 80% Moderate Assist   15 - 19 CK 40 - 60% Moderate Assist   20 - 22 CJ 20 - 40% Minimal Assist   23 CI 1-20% SBA / CGA   24 CH 0% Independent/ Mod I     Patient left up in chair with pillow under Bilateral lower leg, all lines intact, call button in reach, nurse Loren  notified and spouse present.    Assessment:  Pearl Woodard is a 76 y.o. female with a medical diagnosis of Closed left hip fracture and presents 5 days s/p L hip IMN. Pt required less verbal cues to continue advancing BLE during gait training today. Pt able to ambulate 100ft without any seated breaks. Pt would continue to benefit from skilled PT services BID while in the hospital.     Rehab identified problem list/impairments: Rehab identified problem list/impairments: weakness, impaired balance, gait instability, impaired functional mobilty, pain, impaired cognition, decreased safety awareness, decreased lower extremity function, orthopedic precautions, impaired coordination, decreased coordination, impaired self care skills    Rehab potential is fair+.    Activity tolerance:  Fair    Discharge recommendations: Discharge Facility/Level Of Care Needs: nursing facility, skilled, home health PT     Barriers to discharge: Barriers to Discharge: None    Equipment recommendations: Equipment Needed After Discharge: walker, rolling, wheelchair     GOALS:    Physical Therapy Goals        Problem: Physical Therapy Goal    Goal Priority Disciplines Outcome Goal Variances Interventions   Physical Therapy Goal     PT/OT, PT Ongoing (interventions implemented as appropriate)     Description:  Goals to be met by: 2017    Patient will increase functional independence with mobility by performin. Supine to sit with Stand-by Assistance  2. Sit to stand transfer with Supervision  3. Gait  x 100 feet with Stand-by Assistance using Rolling Walker.   4. Lower extremity exercise program x20 reps per handout, with supervision  5. Bed to chair transfer with Stand-by Assistance using Rolling Walker.                        PLAN:    Patient to be seen BID  to address the above listed problems via gait training, therapeutic activities, therapeutic exercises  Plan of Care expires: 17  Plan of Care reviewed with: patient, spouse         TITO Santos  2017

## 2017-06-13 NOTE — PROGRESS NOTES
LABS FAXED TO [facility] AS REQUESTED.  NO CXR ON FILE.  WILL REQUEST ORDER.  RABIA BECKWITH LMSW, [facility]AS[facility], [facility]  6/13/2017

## 2017-06-13 NOTE — PLAN OF CARE
Problem: Physical Therapy Goal  Goal: Physical Therapy Goal  Goals to be met by: 2017    Patient will increase functional independence with mobility by performin. Supine to sit with Stand-by Assistance  2. Sit to stand transfer with Supervision  3. Gait  x 100 feet with Stand-by Assistance using Rolling Walker.   4. Lower extremity exercise program x20 reps per handout, with supervision  5. Bed to chair transfer with Stand-by Assistance using Rolling Walker.       Outcome: Ongoing (interventions implemented as appropriate)  Pt progressing well with gait distance and requiring less verbal cues during gait training. Pt would continue to benefit from skilled PT services BID while in the hospital.

## 2017-06-13 NOTE — PROGRESS NOTES
POD # 5. Afebrile. Drop foot on the left which pre-existed surgery. Dressing is dry. Last h&h 10.7 & 31.6. Waiting on SNF.

## 2017-06-13 NOTE — PT/OT/SLP PROGRESS
Physical Therapy  PM Treatment    Pearl Woodard   MRN: [id number]   Admitting Diagnosis: Closed left hip fracture    PT Received On: 06/13/17  PT Start Time: 1414     PT Stop Time: 1442    PT Total Time (min): 28 min       Billable Minutes:  Gait [id number] and Therapeutic Exercise 14    Treatment Type: Treatment  PT/PTA: PT     PTA Visit Number: 0       General Precautions: Standard, fall  Orthopedic Precautions: LLE weight bearing as tolerated   Braces:  L AFO     Subjective:  Pt reported just receiving a laxative, but that she was willing to participate in PT services.     Pain/Comfort  Pain Rating 1: 0/10  Location - Side 1: Left  Location 1: hip      Objective:   Patient found with: peripheral IV    Functional Mobility:  Bed Mobility:   Rolling/Turning Right: Moderate assistance  Scooting/Bridging: Minimum Assistance  Supine to Sit: Moderate Assistance, With leg lift  Sit to Supine: Minimum Assistance, With leg lift    Transfers:  Sit <> Stand Assistance: Minimum Assistance  Sit <> Stand Assistive Device: Rolling Walker    Gait:   Gait Distance: 120ft  (Pt required verbal cues active assistance to continue pushing and navigating rolling walker. Pt required verbal cues to widen base of support. )  Assistance 1: Minimum Assistance   Gait Assistive Device: Rolling walker  Gait Pattern: reciprocal  Gait Deviation(s): decreased caroline, decreased toe-to-floor clearance, increased time in double stance, decreased velocity of limb motion, decreased step length, decreased stride length    Balance:   Static Sit: FAIR: Maintains without assist, but unable to take any challenges   Static Stand: FAIR: Maintains without assist but unable to take challenges  Dynamic stand: POOR: Requires Minimum Assistance during gait     Therapeutic Activities and Exercises:  Pt performed therex BLE x10 supine     Straight leg raises (active assistance with LLE)  Glute sets   Hip Abduction/Adduction (active assistance with LLE)   Quad  sets     AM-PAC 6 CLICK MOBILITY  How much help from another person does this patient currently need?   1 = Unable, Total/Dependent Assistance  2 = A lot, Maximum/Moderate Assistance  3 = A little, Minimum/Contact Guard/Supervision  4 = None, Modified [place]/Independent    Turning over in bed (including adjusting bedclothes, sheets and blankets)?: 2  Sitting down on and standing up from a chair with arms (e.g., wheelchair, bedside commode, etc.): 3  Moving from lying on back to sitting on the side of the bed?: 2  Moving to and from a bed to a chair (including a wheelchair)?: 2  Need to walk in hospital room?: 3  Climbing 3-5 steps with a railing?: 1  Total Score: 13    AM-PAC Raw Score CMS G-Code Modifier Level of Impairment Assistance   6 % Total / Unable   7 - 9 CM 80 - 100% Maximal Assist   10 - 14 CL 60 - 80% Moderate Assist   15 - 19 CK 40 - 60% Moderate Assist   20 - 22 CJ 20 - 40% Minimal Assist   23 CI 1-20% SBA / CGA   24 CH 0% Independent/ Mod I     Patient left HOB elevated with bilateral heels offloaded, all lines intact, call button in reach, nurse  notified and son present.    Assessment:  Pearl Woodard is a 76 y.o. female with a medical diagnosis of Closed left hip fracture and presents 5 days s/p L hip IMN. Pt progressing well with gait distance and requiring less verbal commands during gait training. Pt still requires active assistance to continue pushing and navigating the rolling walker. Pt would continue to benefit from skilled PT services BID while in hospital.     Rehab identified problem list/impairments: Rehab identified problem list/impairments: weakness, impaired endurance, impaired self care skills, impaired functional mobilty, gait instability, impaired balance, impaired cognition, decreased coordination, decreased lower extremity function, pain, decreased safety awareness, orthopedic precautions, impaired coordination    Rehab potential is fair+    Activity  tolerance: Fair    Discharge recommendations: Discharge Facility/Level Of Care Needs: nursing facility, skilled, home health PT     Barriers to discharge: Barriers to Discharge: None    Equipment recommendations: Equipment Needed After Discharge: walker, rolling, wheelchair     GOALS:    Physical Therapy Goals        Problem: Physical Therapy Goal    Goal Priority Disciplines Outcome Goal Variances Interventions   Physical Therapy Goal     PT/OT, PT Ongoing (interventions implemented as appropriate)     Description:  Goals to be met by: 2017    Patient will increase functional independence with mobility by performin. Supine to sit with Stand-by Assistance  2. Sit to stand transfer with Supervision  3. Gait  x 100 feet with Stand-by Assistance using Rolling Walker.   4. Lower extremity exercise program x20 reps per handout, with supervision  5. Bed to chair transfer with Stand-by Assistance using Rolling Walker.                        PLAN:    Patient to be seen BID  to address the above listed problems via gait training, therapeutic activities, therapeutic exercises  Plan of Care expires: 17  Plan of Care reviewed with: patient, juan         Herb Epifanio, [facility]  2017

## 2017-06-13 NOTE — PROGRESS NOTES
Update:  Patient needs CXR.  Order obtain. Message left for Laurie Bass this date advising that labs had been faxed.

## 2017-06-13 NOTE — CONSULTS
SW f/u with patient with son, Ousmane, at the bedside.  Discussed discharge plan of SNF.  Patient's son advised SW to f/u with patient's  Osmany.  SW f/u with patient's spouse via telephone to discuss SNF.  Patient's spouse stated that their only preferred provider was [facility].  SW clarified with patient's spouse that if no bed is available at [facility], the plan would be for patient to discharge to home with HH.  Patient's spouse confirmed and verbalized understanding.    Patient's choice form completed with via telephone contact with patient's spouse, Osmany.      Packet containing demographics, H&P, PT/OT notes, 142, PPD and PASRR sent to [facility] via [employer].  Awaiting response from [facility].    Karrie Akhtar LMSW, [facility]AS[facility], [facility]  06/12/2017

## 2017-06-13 NOTE — PLAN OF CARE
Problem: Fall Risk (Adult)  Goal: Identify Related Risk Factors and Signs and Symptoms  Related risk factors and signs and symptoms are identified upon initiation of Human Response Clinical Practice Guideline (CPG)   Outcome: Ongoing (interventions implemented as appropriate)   06/12/17 1847   Fall Risk   Related Risk Factors (Fall Risk) age-related changes;gait/mobility problems;history of falls   Signs and Symptoms (Fall Risk) presence of risk factors     Goal: Absence of Falls  Patient will demonstrate the desired outcomes by discharge/transition of care.   Outcome: Ongoing (interventions implemented as appropriate)   06/13/17 0537   Fall Risk (Adult)   Absence of Falls making progress toward outcome       Problem: Fractured Hip (Adult)  Goal: Signs and Symptoms of Listed Potential Problems Will be Absent, Minimized or Managed (Fractured Hip)  Signs and symptoms of listed potential problems will be absent, minimized or managed by discharge/transition of care (reference Fractured Hip (Adult) CPG).   Outcome: Ongoing (interventions implemented as appropriate)   06/12/17 0315 06/13/17 0537   Fractured Hip   Problems Assessed (Fractured Hip) all --    Problems Present (Fractured Hip) --  none       Problem: Infection, Risk/Actual (Adult)  Goal: Identify Related Risk Factors and Signs and Symptoms  Related risk factors and signs and symptoms are identified upon initiation of Human Response Clinical Practice Guideline (CPG)   Outcome: Ongoing (interventions implemented as appropriate)   06/12/17 0315   Infection, Risk/Actual   Related Risk Factors (Infection, Risk/Actual) surgery/procedure;skin integrity impairment;trauma injury   Signs and Symptoms (Infection, Risk/Actual) pain;weakness     Goal: Infection Prevention/Resolution  Patient will demonstrate the desired outcomes by discharge/transition of care.   Outcome: Ongoing (interventions implemented as appropriate)   06/13/17 0537   Infection, Risk/Actual (Adult)    Infection Prevention/Resolution making progress toward outcome

## 2017-06-14 VITALS
OXYGEN SATURATION: 100 % | BODY MASS INDEX: 21.29 KG/M2 | HEIGHT: 66 IN | DIASTOLIC BLOOD PRESSURE: 65 MMHG | TEMPERATURE: 99 F | RESPIRATION RATE: 18 BRPM | WEIGHT: 132.5 LBS | SYSTOLIC BLOOD PRESSURE: 167 MMHG | HEART RATE: 89 BPM

## 2017-06-14 PROCEDURE — G8979 MOBILITY GOAL STATUS: HCPCS | Mod: CJ

## 2017-06-14 PROCEDURE — G8978 MOBILITY CURRENT STATUS: HCPCS | Mod: CK

## 2017-06-14 PROCEDURE — 63600175 PHARM REV CODE 636 W HCPCS: Performed by: ORTHOPAEDIC SURGERY

## 2017-06-14 PROCEDURE — G8989 SELF CARE D/C STATUS: HCPCS | Mod: CK

## 2017-06-14 PROCEDURE — 97535 SELF CARE MNGMENT TRAINING: CPT

## 2017-06-14 PROCEDURE — 97110 THERAPEUTIC EXERCISES: CPT

## 2017-06-14 PROCEDURE — 25000003 PHARM REV CODE 250: Performed by: INTERNAL MEDICINE

## 2017-06-14 PROCEDURE — 94761 N-INVAS EAR/PLS OXIMETRY MLT: CPT

## 2017-06-14 PROCEDURE — G8987 SELF CARE CURRENT STATUS: HCPCS | Mod: CK

## 2017-06-14 PROCEDURE — 97116 GAIT TRAINING THERAPY: CPT

## 2017-06-14 PROCEDURE — 94799 UNLISTED PULMONARY SVC/PX: CPT

## 2017-06-14 PROCEDURE — G8980 MOBILITY D/C STATUS: HCPCS | Mod: CK

## 2017-06-14 PROCEDURE — G8988 SELF CARE GOAL STATUS: HCPCS | Mod: CJ

## 2017-06-14 RX ORDER — AMOXICILLIN 250 MG
1 CAPSULE ORAL 2 TIMES DAILY
Status: DISCONTINUED | OUTPATIENT
Start: 2017-06-14 | End: 2017-06-14 | Stop reason: HOSPADM

## 2017-06-14 RX ORDER — LACTULOSE 10 G/15ML
20 SOLUTION ORAL DAILY PRN
Qty: 30 ML | Refills: 0 | Status: SHIPPED | OUTPATIENT
Start: 2017-06-14 | End: 2017-06-14

## 2017-06-14 RX ORDER — TRAMADOL HYDROCHLORIDE 50 MG/1
50 TABLET ORAL EVERY 6 HOURS PRN
Qty: 20 TABLET | Refills: 0 | Status: SHIPPED | OUTPATIENT
Start: 2017-06-14 | End: 2017-07-06 | Stop reason: ALTCHOICE

## 2017-06-14 RX ORDER — LACTULOSE 10 G/15ML
20 SOLUTION ORAL DAILY PRN
Qty: 480 ML | Refills: 0 | Status: SHIPPED | OUTPATIENT
Start: 2017-06-14

## 2017-06-14 RX ORDER — AMOXICILLIN 250 MG
1 CAPSULE ORAL 2 TIMES DAILY
COMMUNITY
Start: 2017-06-14 | End: 2017-07-06

## 2017-06-14 RX ADMIN — MEMANTINE HYDROCHLORIDE 28 MG: 28 CAPSULE, EXTENDED RELEASE ORAL at 08:06

## 2017-06-14 RX ADMIN — DOCUSATE SODIUM AND SENNOSIDES 1 TABLET: 8.6; 5 TABLET, FILM COATED ORAL at 08:06

## 2017-06-14 RX ADMIN — LACTULOSE 20 G: 20 SOLUTION ORAL at 06:06

## 2017-06-14 RX ADMIN — HYDROCODONE BITARTRATE AND ACETAMINOPHEN 1 TABLET: 5; 325 TABLET ORAL at 12:06

## 2017-06-14 RX ADMIN — ENOXAPARIN SODIUM 40 MG: 100 INJECTION SUBCUTANEOUS at 06:06

## 2017-06-14 RX ADMIN — CLONIDINE HYDROCHLORIDE 0.1 MG: 0.1 TABLET ORAL at 06:06

## 2017-06-14 RX ADMIN — SERTRALINE HYDROCHLORIDE 25 MG: 25 TABLET ORAL at 08:06

## 2017-06-14 RX ADMIN — AMLODIPINE BESYLATE 10 MG: 5 TABLET ORAL at 08:06

## 2017-06-14 RX ADMIN — SODIUM PHOSPHATE, DIBASIC AND SODIUM PHOSPHATE, MONOBASIC 1 ENEMA: 7; 19 ENEMA RECTAL at 07:06

## 2017-06-14 RX ADMIN — ACETAMINOPHEN 500 MG: 500 TABLET ORAL at 08:06

## 2017-06-14 RX ADMIN — ACETAMINOPHEN 500 MG: 500 TABLET ORAL at 05:06

## 2017-06-14 RX ADMIN — ASPIRIN 81 MG: 81 TABLET, COATED ORAL at 08:06

## 2017-06-14 RX ADMIN — FOLIC ACID 1 MG: 1 TABLET ORAL at 08:06

## 2017-06-14 RX ADMIN — LEVOTHYROXINE SODIUM 25 MCG: 25 TABLET ORAL at 06:06

## 2017-06-14 NOTE — PROGRESS NOTES
[facility] Hospital Medicine  Progress Note    Patient Name: Pearl Woodard  MRN: [id number]  Patient Class: IP- Inpatient   Admission Date: 6/7/2017  Length of Stay: 6 days  Attending Physician: Tati Gabriel MD  Primary Care Provider: Jarad Daniels MD        Subjective:     Principal Problem:Closed left hip fracture    HPI:  Mrs. Pearl Woodard is a 76 y.o. female with essential hypertension, hypothyroidism (TSH unknown), rheumatoid arthritis, depression, and dementia who presents to [facility] ED with complaints of a fall today.  She was in her bathroom and was reaching for her walker tonight when she slipped and fell.  She cannot contribute meaningfully to her interview only to say that she is here for a fall and that she is nervous.  Further history is limited at this time.    Hospital Course:  Patient admitted to the hospital for eval and treatment of L hip fracture after mechanical fall.  Ortho was consulted and the patient underwent repair on 6/8.  was consulted for SNF placement.  PT/OT were consulted and recommend SNF.     Interval History: complaining of constipation, otherwise no other complaints. Feels well. Participating with PT    Review of Systems   Constitutional: Negative.    HENT: Negative.    Eyes: Negative.    Respiratory: Negative.    Cardiovascular: Negative.    Gastrointestinal: Positive for constipation.   Genitourinary: Negative.    Musculoskeletal: Negative.    Skin: Negative.    Neurological: Negative.    Hematological: Negative.    Psychiatric/Behavioral: Negative.      Objective:     Vital Signs (Most Recent):  Temp: 97.7 °F (36.5 °C) (06/13/17 1959)  Pulse: 97 (06/13/17 1959)  Resp: 18 (06/13/17 1959)  BP: (!) 174/74 (06/13/17 1959)  SpO2: 98 % (06/13/17 1959) Vital Signs (24h Range):  Temp:  [97.7 °F (36.5 °C)-99.1 °F (37.3 °C)] 97.7 °F (36.5 °C)  Pulse:  [77-97] 97  Resp:  [16-18] 18  SpO2:  [94 %-99 %] 98 %  BP: (137-174)/(70-83) 174/74      Weight: 60.1 kg (132 lb 7.9 oz)  Body mass index is 21.71 kg/m².    Intake/Output Summary (Last 24 hours) at 06/13/17 2017  Last data filed at 06/13/17 1745   Gross per 24 hour   Intake              840 ml   Output                0 ml   Net              840 ml      Physical Exam   Constitutional: She is oriented to person, place, and time. She appears well-developed and well-nourished.   HENT:   Head: Normocephalic and atraumatic.   Eyes: Conjunctivae and EOM are normal.   Neck: Normal range of motion.   Cardiovascular: Normal rate.    Pulmonary/Chest: Effort normal.   Abdominal: Soft. Bowel sounds are normal. She exhibits no distension. There is no tenderness. There is no guarding.   Musculoskeletal: Normal range of motion. She exhibits no edema or tenderness.   Neurological: She is alert and oriented to person, place, and time.   Psychiatric: She has a normal mood and affect. Her behavior is normal. Judgment and thought content normal.   Nursing note and vitals reviewed.      Significant Labs: All pertinent labs within the past 24 hours have been reviewed.    Magnesium:  No results for input(s): MG in the last 48 hours.    Significant Imaging: I have reviewed all pertinent imaging results/findings within the past 24 hours.  I have reviewed and interpreted all pertinent imaging results/findings within the past 24 hours.    Assessment/Plan:      Slow transit constipation    Trial of PRN lactulose          Malnutrition of moderate degree    Will discuss with patient           Acute blood loss anemia    As expected from surgery. Noted drop on 6/10. Now stable          Dementia without behavioral disturbance    Stable; will continue her home regimen of memantine.        Depression    Stable; will continue her home regimen of sertraline.        Essential hypertension    Patient's blood pressure is well-controlled; will continue home regimen of amlodipine (increase dose), and provide as-needed clonidine.         Hypothyroidism    Will continue her home regimen of levothyroxine.        Rheumatoid arthritis    Stable; will continue her home regimen of methotrexate.        * Closed left hip fracture    S/p repair on 6/8.  Doing well. PT/OT rec. SNF  PPD placed 6/9          VTE Risk Mitigation         Ordered     enoxaparin injection 40 mg  Every 24 hours (non-standard times)     Route:  Subcutaneous        06/08/17 1642     Medium Risk of VTE  Once      06/08/17 1642        Pending SNF    Tati Herrmann MD  Department of Hospital Medicine   [facility]

## 2017-06-14 NOTE — PT/OT/SLP PROGRESS
Physical Therapy  AM Treatment    Pearl Woodard   MRN: [id number]   Admitting Diagnosis: Closed left hip fracture    PT Received On: 06/14/17  PT Start Time: 1103     PT Stop Time: 1139    PT Total Time (min): 36 min       Billable Minutes:  Gait [id number] and Therapeutic Exercise 18    Treatment Type: Treatment  PT/PTA: PT     PTA Visit Number: 0       General Precautions: Standard, fall  Orthopedic Precautions: LLE weight bearing as tolerated   Braces:  L AFO     Subjective:  Pt reported that her L hip was hurting today and that she had a rough morning.     Pain/Comfort  Pain Rating 1: 8/10  Location - Side 1: Left  Location 1: hip  Pain Addressed 1: Pre-medicate for activity    Objective:   Patient found with: peripheral IV    Functional Mobility:  Bed Mobility:   Rolling/Turning to Left: Contact guard assistance, With side rail  Scooting/Bridging: Minimum Assistance  Supine to Sit: With leg lift, With side rail, Moderate Assistance    Transfers:  Sit <> Stand Assistance: Minimum Assistance  Sit <> Stand Assistive Device: Rolling Walker L AFO    Gait:   Gait Distance: 30ft then 40ft with seated break  (Pt required less assistance and verbal cues with managing rolling walker today.)  Assistance 1: Contact Guard Assistance  Gait Assistive Device: Rolling walker  Gait Pattern: reciprocal  Gait Deviation(s): decreased caroline, increased time in double stance, decreased velocity of limb motion, decreased step length, decreased stride length, decreased toe-to-floor clearance      Balance:   Static Sit: FAIR: Maintains without assist, but unable to take any challenges   Static Stand: FAIR: Maintains without assist but unable to take challenges  Dynamic stand: FAIR: Needs CONTACT GUARD during gait     Therapeutic Activities and Exercises:  Pt performed BLE therex x 10 seated     Ankle DF/PF (only on RLE secondary to history of L footdrop)  Hip flexion (active assistance on LLE)   Glute sets   Hip  abduction/adduction   Quad sets   LAQ        AM-PAC 6 CLICK MOBILITY  How much help from another person does this patient currently need?   1 = Unable, Total/Dependent Assistance  2 = A lot, Maximum/Moderate Assistance  3 = A little, Minimum/Contact Guard/Supervision  4 = None, Modified [place]/Independent    Turning over in bed (including adjusting bedclothes, sheets and blankets)?: 3  Sitting down on and standing up from a chair with arms (e.g., wheelchair, bedside commode, etc.): 3  Moving from lying on back to sitting on the side of the bed?: 2  Moving to and from a bed to a chair (including a wheelchair)?: 2  Need to walk in hospital room?: 3  Climbing 3-5 steps with a railing?: 2  Total Score: 15    AM-PAC Raw Score CMS G-Code Modifier Level of Impairment Assistance   6 % Total / Unable   7 - 9 CM 80 - 100% Maximal Assist   10 - 14 CL 60 - 80% Moderate Assist   15 - 19 CK 40 - 60% Moderate Assist   20 - 22 CJ 20 - 40% Minimal Assist   23 CI 1-20% SBA / CGA   24 CH 0% Independent/ Mod I     Patient left up in chair on seat cushion, feet elevated,with pillow under bilateral lower legs, all lines intact, call button in reach, nurse  notified and spouse present.    Assessment:  Pearl Woodard is a 76 y.o. female with a medical diagnosis of Closed left hip fracture and presents 6 days s/p L hip IMN. Pt required less verbal cues during gait training and was able to push walker on her own today. Pt ambulated 70ft with contact guard using a rolling walker this morning. Pt would continue to benefit from skilled PT services BID while in the hospital.     Rehab identified problem list/impairments: Rehab identified problem list/impairments: weakness, impaired endurance, impaired functional mobilty, impaired cognition, decreased coordination, gait instability, impaired balance, decreased lower extremity function, pain, decreased safety awareness, impaired coordination, orthopedic precautions    Rehab  potential is fair+    Activity tolerance: Fair    Discharge recommendations: Discharge Facility/Level Of Care Needs: nursing facility, skilled, home health PT     Barriers to discharge: Barriers to Discharge: None    Equipment recommendations: Equipment Needed After Discharge: walker, rolling, wheelchair     GOALS:    Physical Therapy Goals        Problem: Physical Therapy Goal    Goal Priority Disciplines Outcome Goal Variances Interventions   Physical Therapy Goal     PT/OT, PT Ongoing (interventions implemented as appropriate)     Description:  Goals to be met by: 2017    Patient will increase functional independence with mobility by performin. Supine to sit with Stand-by Assistance  2. Sit to stand transfer with Supervision  3. Gait  x 100 feet with Stand-by Assistance using Rolling Walker.   4. Lower extremity exercise program x20 reps per handout, with supervision  5. Bed to chair transfer with Stand-by Assistance using Rolling Walker.                        PLAN:    Patient to be seen BID  to address the above listed problems via gait training, therapeutic activities, therapeutic exercises  Plan of Care expires: 17  Plan of Care reviewed with: patient, spouse         TITO Santos  2017

## 2017-06-14 NOTE — PLAN OF CARE
06/14/17 1617   Final Note   Assessment Type Final Discharge Note   Discharge Disposition SNF   Discharge planning education complete? Yes   What phone number can be called within the next 1-3 days to see how you are doing after discharge? [phone]   Hospital Follow Up  Appt(s) scheduled? Yes   Discharge plans and expectations educations in teach back method with documentation complete? Yes   Offered [facility] -- Bedside Delivery? n/a   Discharge/Hospital Encounter Summary to (non[facility]) PCP No   Referral to Outpatient Case Management complete? No   Referral to / orders for Home Health Complete? No   30 day supply of medicines given at discharge, if documented non-compliance / non-adherence? No   Any social issues identified prior to discharge? No   Did you assess the readiness or willingness of the family or caregiver to support self management of care? Yes   Right Care Referral Info   Post Acute Recommendation SNF   Referral Type SNF   Facility Name [facility] [address] [place]  [address]   Nurse, Katharine, provided with call report to information.  Transportation scheduled.  Estimated arrival time 6:00 p.m.  via [facility]  [phone].  Karrie Akhtar, JAVIER, ACM-SW, CCM

## 2017-06-14 NOTE — PLAN OF CARE
Problem: Fall Risk (Adult)  Goal: Identify Related Risk Factors and Signs and Symptoms  Related risk factors and signs and symptoms are identified upon initiation of Human Response Clinical Practice Guideline (CPG)   Outcome: Ongoing (interventions implemented as appropriate)   06/12/17 1847   Fall Risk   Related Risk Factors (Fall Risk) age-related changes;gait/mobility problems;history of falls   Signs and Symptoms (Fall Risk) presence of risk factors     Goal: Absence of Falls  Patient will demonstrate the desired outcomes by discharge/transition of care.   Outcome: Ongoing (interventions implemented as appropriate)   06/14/17 0302   Fall Risk (Adult)   Absence of Falls making progress toward outcome       Problem: Pressure Ulcer Risk (Vinnie Scale) (Adult,Obstetrics,Pediatric)  Goal: Identify Related Risk Factors and Signs and Symptoms  Related risk factors and signs and symptoms are identified upon initiation of Human Response Clinical Practice Guideline (CPG)   Outcome: Ongoing (interventions implemented as appropriate)   06/12/17 1847   Pressure Ulcer Risk (Vinnie Scale)   Related Risk Factors (Pressure Ulcer Risk (Vinnie Scale)) age extremes;cognitive impairment;fluid intake inadequate;mobility impaired     Goal: Skin Integrity  Patient will demonstrate the desired outcomes by discharge/transition of care.   Outcome: Ongoing (interventions implemented as appropriate)   06/14/17 0302   Pressure Ulcer Risk (Vinnie Scale) (Adult,Obstetrics,Pediatric)   Skin Integrity making progress toward outcome       Problem: Infection, Risk/Actual (Adult)  Goal: Identify Related Risk Factors and Signs and Symptoms  Related risk factors and signs and symptoms are identified upon initiation of Human Response Clinical Practice Guideline (CPG)   Outcome: Ongoing (interventions implemented as appropriate)   06/12/17 0315   Infection, Risk/Actual   Related Risk Factors (Infection, Risk/Actual) surgery/procedure;skin integrity  impairment;trauma injury   Signs and Symptoms (Infection, Risk/Actual) pain;weakness     Goal: Infection Prevention/Resolution  Patient will demonstrate the desired outcomes by discharge/transition of care.   Outcome: Ongoing (interventions implemented as appropriate)   06/14/17 0302   Infection, Risk/Actual (Adult)   Infection Prevention/Resolution making progress toward outcome

## 2017-06-14 NOTE — PROGRESS NOTES
Pt IV d/c w/tip intact 2x2 & tape applied, pt given discharge f/u & prescription info, pt &  verbalized understanding & all questions addressed, pt wheeled off [facility]SU per SPD via WC w/ at side and all belongings nadn

## 2017-06-14 NOTE — PROGRESS NOTES
Monitored freq.throughout the shift. Pt.resting at present with no complaints and no acute distress noted. Med.for pain as needed & med.helpful. Spouse at bedside. Call light in reach.

## 2017-06-14 NOTE — NURSING
Call placed to Dr. Gama , informed him that patient's  is at bedside requesting that patient receive a suppository in addition to the already given Lactulose.. MD stated to let the Lactulose work and repeat tomorrow as ordered.  Md stated that he would not be ordering any suppositories at this time.

## 2017-06-14 NOTE — ASSESSMENT & PLAN NOTE
Patient's blood pressure is well-controlled; will continue home regimen of amlodipine (increase dose), and provide as-needed clonidine.

## 2017-06-14 NOTE — PROGRESS NOTES
Call report to [facility], [facility], , Nurse Rommel, [phone].  Karrie Akhtar LMSw, aCJOHN-SW, CCM  06/14/2017

## 2017-06-14 NOTE — PT/OT/SLP PROGRESS
Occupational Therapy  Treatment    Pearl Woodard   MRN: [id number]   Admitting Diagnosis: Closed left hip fracture    OT Date of Treatment: 06/14/17   OT Start Time: 1544  OT Stop Time: 1614  OT Total Time (min): 30 min    Billable Minutes:  Self Care/Home Management 30    General Precautions: Standard, fall  Orthopedic Precautions: LLE weight bearing as tolerated  Braces: AFO (;eft)         Subjective:  Communicated with nurseIlda prior to session.    Pain/Comfort  Pain Rating 1: 0/10    Objective:  Patient found with: peripheral IV, bed alarm     Functional Mobility:  Bed Mobility:  Scooting/Bridging: Minimum Assistance  Supine to Sit: Maximum Assistance  Sit to Supine: Maximum Assistance    Transfers:   Sit <> Stand Assistance: Contact Guard Assistance, Minimum Assistance  Sit <> Stand Assistive Device: Rolling Walker  Toilet Transfer Assistance: Minimum Assistance  Toilet Transfer Assistive Device: Rolling Walker, grab bar    Functional Ambulation: Patient amb using a RW with CGA and min assist to manage the RW. Patient required multiple VC for safe RW use.    Activities of Daily Living:  UE Dressing Level of Assistance: Moderate assistance (to doff back gown)  LE Dressing Level of Assistance: Total assistance (to don socks and don/doff diaper)       Grooming Level of Assistance: Total assistance  Toileting Where Assessed: Toilet  Toileting Level of Assistance: Total assistance      Balance:   Static Sit: FAIR+: Able to take MINIMAL challenges from all directions  Dynamic Sit: FAIR+: Maintains balance through MINIMAL excursions of active trunk motion  Static Stand: FAIR+: Takes MINIMAL challenges from all directions  Dynamic stand: FAIR: Needs CONTACT GUARD during gait      AM-PAC 6 CLICK ADL   How much help from another person does this patient currently need?   1 = Unable, Total/Dependent Assistance  2 = A lot, Maximum/Moderate Assistance  3 = A little, Minimum/Contact Guard/Supervision  4 = None,  "Modified [place]/Independent    Putting on and taking off regular lower body clothing? : 1  Bathing (including washing, rinsing, drying)?: 2  Toileting, which includes using toilet, bedpan, or urinal? : 3  Putting on and taking off regular upper body clothing?: 3  Taking care of personal grooming such as brushing teeth?: 3  Eating meals?: 4  Total Score: 16     AM-PAC Raw Score CMS "G-Code Modifier Level of Impairment Assistance   6 % Total / Unable   7 - 8 CM 80 - 100% Maximal Assist   9-13 CL 60 - 80% Moderate Assist   14 - 19 CK 40 - 60% Moderate Assist   20 - 22 CJ 20 - 40% Minimal Assist   23 CI 1-20% SBA / CGA   24 CH 0% Independent/ Mod I       Patient left supine with all lines intact, call button in reach, bed alarm on and spouse present    ASSESSMENT:      Rehab identified problem list/impairments: Rehab identified problem list/impairments: weakness, gait instability, impaired functional mobilty, impaired self care skills, impaired balance, decreased safety awareness, decreased lower extremity function, impaired skin, orthopedic precautions    Rehab potential is good.    Activity tolerance: Good    Discharge recommendations: Discharge Facility/Level Of Care Needs: nursing facility, skilled     Barriers to discharge: Barriers to Discharge: None    Equipment recommendations: none     GOALS:    Occupational Therapy Goals        Problem: Occupational Therapy Goal    Goal Priority Disciplines Outcome Interventions   Occupational Therapy Goal     OT, PT/OT Ongoing (interventions implemented as appropriate)    Description:  Goals to be met by: 06/30/17     Patient will increase functional independence with ADLs by performing:    UE Dressing with Set-up Assistance.  Grooming while standing at sink with Contact Guard Assistance.  Toileting from bedside commode with Minimal Assistance for hygiene and clothing management.   Toilet transfer to bedside commode with Contact Guard Assistance.  Upper extremity " exercise program x 10 reps with assistance as needed.  Met 6/12/17                       Plan:  Patient to be seen 5 x/week to address the above listed problems via self-care/home management, therapeutic activities, therapeutic exercises  Plan of Care expires: 06/30/17  Plan of Care reviewed with: patient, spouse         Marquita Kamari, OT  06/14/2017

## 2017-06-14 NOTE — PLAN OF CARE
[facility]     Department of Hospital Medicine     [facility] [place] [address]     [phone] after hours  [phone] fax       NURSING HOME ORDERS    Patient Name: Pearl Woodard  YOB: [birth year]    Admit to Nursing Home:    Skilled Bed                                               Diagnoses:  Active Hospital Problems    Diagnosis  POA    *Closed left hip fracture [S72.002A]  Yes    Slow transit constipation [K59.01]  Yes    Acute blood loss anemia [D62]  No    Malnutrition of moderate degree [E44.0]  Yes    Essential hypertension [I10]  Yes     Chronic    Depression [F32.9]  Yes     Chronic    Dementia without behavioral disturbance [F03.90]  Yes     Chronic    Hypothyroidism [E03.9]  Yes     Chronic     2013: Diagnosed.      Rheumatoid arthritis [M06.9]  Yes     Chronic     2005: Diagnosed.        Resolved Hospital Problems    Diagnosis Date Resolved POA   No resolved problems to display.       Patient is homebound due to:  Closed left hip fracture    Allergies:  Review of patient's allergies indicates:   Allergen Reactions    Statins-hmg-coa reductase inhibitors Other (See Comments)     2013: Severe muscle pain.    Demerol [meperidine]     Sulfa (sulfonamide antibiotics)        Vitals:      Every shift (Skilled Nursing patients)    Diet: 2 gram sodium diet              Supplement:  1 can every three times a day with meals                         Type:  House   Acitivities:   - Up in a chair each morning as tolerated  - Ambulate with assistance to bathroom  - May use walker, cane, or self-propelled wheelchair      LABS:  Per facility protocol     CMP, CBC each month for 3 months   PT-INR each week for 1 month then monthly   Pre-albumin each month for 3 months   TSH every year    Nursing Precautions:    - Aspiration precautions:             - Total assistance with meals            -  Upright 90 degrees befor  during and after meals             -  Suction at bedside          - Fall precautions per nursing home protocol   - Seizure precaution per senior care protocol   - Decubitus precautions:        -  for positioning   - Pressure reducing foam mattress   - Turn patient every two hours. Use wedge pillows to anchor patient    CONSULTS:      Physical Therapy to evaluate and treat     Occupational Therapy to evaluate and treat                      Medications: Discontinue all previous medication orders, if any. See new list below.     Derek Woodardia Jovanni   [place] Medication Instructions AYDE:[id number]    Printed on:06/14/17 4452   Medication Information                      amlodipine (NORVASC) 5 MG tablet  Take 1 tablet (5 mg total) by mouth once daily.             aspirin (ECOTRIN) 81 MG EC tablet  Take 1 tablet (81 mg total) by mouth once daily.             CETIRIZINE HCL (ZYRTEC ORAL)  Take 1 capsule by mouth once daily.                        folic acid (FOLVITE) 1 MG tablet  Take one tablet (1 mg) once daily             lactulose (CHRONULAC) 20 gram/30 mL Soln  Take 30 mLs (20 g total) by mouth daily as needed.             levothyroxine (SYNTHROID) 50 MCG tablet  Take 25 mcg by mouth before breakfast.              memantine (NAMENDA) 10 MG Tab  Take 10 mg by mouth once daily.             methotrexate 2.5 MG Tab  Take 20 mg by mouth every 7 days.              mometasone (NASONEX) 50 mcg/actuation nasal spray  2 sprays by Nasal route once daily. As needed             montelukast (SINGULAIR) 10 mg tablet  Take 10 mg by mouth every evening.             senna-docusate 8.6-50 mg (PERICOLACE) 8.6-50 mg per tablet  Take 1 tablet by mouth 2 (two) times daily.             sertraline (ZOLOFT) 100 MG tablet  Take 100 mg by mouth 2 (two) times daily.             tramadol (ULTRAM) 50 mg tablet  Take 50 mg by mouth every 6 (six) hours as needed for Pain.                   Electronically  signed    _________________________________  Tati Herrmann MD  06/14/2017

## 2017-06-14 NOTE — PROGRESS NOTES
Rx needed for Tramadol per Laurie Bass at [facility].  Dr. Herrmann advised.  Karrie Akhtar, JAVIER, ACM-SW, [facility]  06/14/2017.

## 2017-06-14 NOTE — SUBJECTIVE & OBJECTIVE
Interval History: complaining of constipation, otherwise no other complaints. Feels well. Participating with PT    Review of Systems   Constitutional: Negative.    HENT: Negative.    Eyes: Negative.    Respiratory: Negative.    Cardiovascular: Negative.    Gastrointestinal: Positive for constipation.   Genitourinary: Negative.    Musculoskeletal: Negative.    Skin: Negative.    Neurological: Negative.    Hematological: Negative.    Psychiatric/Behavioral: Negative.      Objective:     Vital Signs (Most Recent):  Temp: 97.7 °F (36.5 °C) (06/13/17 1959)  Pulse: 97 (06/13/17 1959)  Resp: 18 (06/13/17 1959)  BP: (!) 174/74 (06/13/17 1959)  SpO2: 98 % (06/13/17 1959) Vital Signs (24h Range):  Temp:  [97.7 °F (36.5 °C)-99.1 °F (37.3 °C)] 97.7 °F (36.5 °C)  Pulse:  [77-97] 97  Resp:  [16-18] 18  SpO2:  [94 %-99 %] 98 %  BP: (137-174)/(70-83) 174/74     Weight: 60.1 kg (132 lb 7.9 oz)  Body mass index is 21.71 kg/m².    Intake/Output Summary (Last 24 hours) at 06/13/17 2017  Last data filed at 06/13/17 1745   Gross per 24 hour   Intake              840 ml   Output                0 ml   Net              840 ml      Physical Exam   Constitutional: She is oriented to person, place, and time. She appears well-developed and well-nourished.   HENT:   Head: Normocephalic and atraumatic.   Eyes: Conjunctivae and EOM are normal.   Neck: Normal range of motion.   Cardiovascular: Normal rate.    Pulmonary/Chest: Effort normal.   Abdominal: Soft. Bowel sounds are normal. She exhibits no distension. There is no tenderness. There is no guarding.   Musculoskeletal: Normal range of motion. She exhibits no edema or tenderness.   Neurological: She is alert and oriented to person, place, and time.   Psychiatric: She has a normal mood and affect. Her behavior is normal. Judgment and thought content normal.   Nursing note and vitals reviewed.      Significant Labs: All pertinent labs within the past 24 hours have been  reviewed.    Magnesium:  No results for input(s): MG in the last 48 hours.    Significant Imaging: I have reviewed all pertinent imaging results/findings within the past 24 hours.  I have reviewed and interpreted all pertinent imaging results/findings within the past 24 hours.

## 2017-06-14 NOTE — PROGRESS NOTES
Wheelchair van scheduled for pickup.  Will not be able to arrive for 2 hours per dispatch at [facility]  [phone].  Rx for Tramadol faxed to [facility] and orders amended regarding folic acid and peridex.  Awaiting call report to information and transportation.  Karrie Akhtar LMSW, ROMAN-Sanjiv, CCM  06/14/2017

## 2017-06-14 NOTE — PROGRESS NOTES
CXR results faxed to Laurie Bass at [facility].  Karrie Akhtar LMSW, ROMAN-Sanjiv, [facility]  06/14/2017

## 2017-06-15 ENCOUNTER — PATIENT OUTREACH (OUTPATIENT)
Dept: ADMINISTRATIVE | Facility: CLINIC | Age: 77
End: 2017-06-15
Payer: MEDICARE

## 2017-06-15 NOTE — PT/OT/SLP DISCHARGE
Physical Therapy Discharge Summary    Pearl Woodard  MRN: [id number]   Closed left hip fracture   Patient Discharged from acute Physical Therapy on 17.  Please refer to prior PT noted date on 17 for functional status.     Assessment:   Goals partially met. Patient appropriate for care in another setting.  GOALS:    Physical Therapy Goals        Problem: Physical Therapy Goal    Goal Priority Disciplines Outcome Goal Variances Interventions   Physical Therapy Goal     PT/OT, PT Unable to achieve outcome(s) by discharge     Description:  Goals to be met by: 2017    Patient will increase functional independence with mobility by performin. Supine to sit with Stand-by Assistance  2. Sit to stand transfer with Supervision  3. Gait  x 100 feet with Stand-by Assistance using Rolling Walker.   4. Lower extremity exercise program x20 reps per handout, with supervision  5. Bed to chair transfer with Stand-by Assistance using Rolling Walker.                      Reasons for Discontinuation of Therapy Services  Transfer to alternate level of care.      Plan:  Patient Discharged to: Skilled Nursing Facility.

## 2017-06-17 NOTE — DISCHARGE SUMMARY
[facility] Hospital Medicine  Discharge Summary      Patient Name: Pearl Woodard  MRN: [id number]  Admission Date: 6/7/2017  Hospital Length of Stay: 7 days  Discharge Date and Time:  06/14/2017 7:39 PM  Attending Physician: No att. providers found   Discharging Provider: Tati Herrmann MD  Primary Care Provider: Jarad Daniels MD      HPI:   Mrs. Pearl Woodard is a 76 y.o. female with essential hypertension, hypothyroidism (TSH unknown), rheumatoid arthritis, depression, and dementia who presents to [facility] ED with complaints of a fall today.  She was in her bathroom and was reaching for her walker tonight when she slipped and fell.  She cannot contribute meaningfully to her interview only to say that she is here for a fall and that she is nervous.  Further history is limited at this time.    Procedure(s) (LRB):  IM NAILING OF FEMUR (Left)      Indwelling Lines/Drains at time of discharge:   Lines/Drains/Airways          No matching active lines, drains, or airways        Hospital Course:   Patient admitted to the hospital for eval and treatment of L hip fracture after mechanical fall.  Ortho was consulted and the patient underwent repair on 6/8. No reported complications, other than expected mild blood loss anemia for which she did not become symptomatic. Patient showed great recovery and worked with PT/OT successfully. Treated with lactulose PRN for constipation. No issues with dementia or depression. BP remained well controlled on home regimen. Pain well controlled with as needed tramadol. No issues with rheumatoid arthritis.  was consulted for SNF placement. Transferred on 6/14 in stable condition.  Low sodium diet. Activity as tolerated.     Consults:   Consults         Status Ordering Provider     Inpatient consult to Orthopedic Surgery  Once     Provider:  Aquiles Conteh MD    Completed STEVE FRANKS consult case management/social work  Once      Provider:  (Not yet assigned)    ROSINA Flor              Pending Diagnostic Studies:     Procedure Component Value Units Date/Time    SURG Fl Surgery FL[place] Greater Than 1 Hour [[id number]] Resulted:  06/08/17 1221    Order Status:  Sent Lab Status:  In process Updated:  06/08/17 1331        Final Active Diagnoses:    Diagnosis Date Noted POA    PRINCIPAL PROBLEM:  Closed left hip fracture [S72.002A] 06/07/2017 Yes    Slow transit constipation [K59.01] 06/13/2017 Yes    Acute blood loss anemia [D62] 06/09/2017 No    Malnutrition of moderate degree [E44.0] 06/09/2017 Yes    Essential hypertension [I10] 06/08/2017 Yes     Chronic    Depression [F32.9] 06/08/2017 Yes     Chronic    Dementia without behavioral disturbance [F03.90] 06/08/2017 Yes     Chronic    Hypothyroidism [E03.9] 04/16/2015 Yes     Chronic    Rheumatoid arthritis [M06.9] 03/14/2013 Yes     Chronic      Problems Resolved During this Admission:    Diagnosis Date Noted Date Resolved POA        Discharged Condition: good    Disposition: Skilled Nursing Facility    Follow Up:  Follow-up Information     Jack Mohan On 6/20/2017.    Why:  1:30 p.m. on June 20, 2017 see Dr. Mohan for your post op followup visit.  Appointment will be in the [place] Office.  Contact information:  [address] [place] [address] [phone]             [facility].    Why:  SNF  Contact information:  [address] [facility] [address]               Medications:  Reconciled Home Medications:   Discharge Medication List as of 6/14/2017  6:30 PM      START taking these medications    Details   senna-docusate 8.6-50 mg (PERICOLACE) 8.6-50 mg per tablet Take 1 tablet by mouth 2 (two) times daily., Starting Wed 6/14/2017, OTC         CONTINUE these medications which have CHANGED    Details   lactulose (CHRONULAC) 20 gram/30 mL Soln Take 30 mLs (20 g total) by mouth daily as needed., Starting Wed 6/14/2017, Normal       tramadol (ULTRAM) 50 mg tablet Take 1 tablet (50 mg total) by mouth every 6 (six) hours as needed for Pain., Starting Wed 6/14/2017, Print         CONTINUE these medications which have NOT CHANGED    Details   amlodipine (NORVASC) 5 MG tablet Take 1 tablet (5 mg total) by mouth once daily., Starting 4/25/2017, Until Discontinued, Normal      aspirin (ECOTRIN) 81 MG EC tablet Take 1 tablet (81 mg total) by mouth once daily., Starting 4/25/2017, Until Wed 4/25/18, OTC      CETIRIZINE HCL (ZYRTEC ORAL) Take 1 capsule by mouth once daily., Until Discontinued, Historical Med      folic acid (FOLVITE) 1 MG tablet Starting 9/22/2015, Until Discontinued, Historical Med      levothyroxine (SYNTHROID) 50 MCG tablet Take 25 mcg by mouth before breakfast. , Starting 7/2/2015, Until Discontinued, Historical Med      memantine (NAMENDA) 10 MG Tab Take 10 mg by mouth once daily., Historical Med      sertraline (ZOLOFT) 100 MG tablet Take 100 mg by mouth 2 (two) times daily., Until Discontinued, Historical Med      chlorhexidine (PERIDEX) 0.12 % solution Starting 7/17/2015, Until Discontinued, Historical Med      methotrexate 2.5 MG Tab Take 20 mg by mouth every 7 days. , Starting 7/27/2015, Until Discontinued, Historical Med      mometasone (NASONEX) 50 mcg/actuation nasal spray 2 sprays by Nasal route once daily. As needed, Until Discontinued, Historical Med      montelukast (SINGULAIR) 10 mg tablet Take 10 mg by mouth every evening., Until Discontinued, Historical Med         STOP taking these medications       lorazepam (ATIVAN) 1 MG tablet Comments:   Reason for Stopping:         MYRBETRIQ 25 mg Tb24 Comments:   Reason for Stopping:         MYRBETRIQ 50 mg Tb24 Comments:   Reason for Stopping:         azelastine (ASTELIN) 137 mcg nasal spray Comments:   Reason for Stopping:         buPROPion (WELLBUTRIN SR) 150 MG TBSR 12 hr tablet Comments:   Reason for Stopping:         fexofenadine-pseudoephedrine (ALLEGRA-D 24) 180-240  mg per 24 hr tablet Comments:   Reason for Stopping:         OXYCODONE HCL (OXYCODONE, BULK, MISC) Comments:   Reason for Stopping:         phenylephrine-chlorpheniramine-dihydrocodeine (PANCOF-PD) 2-7.5-3 mg/5 mL syrup Comments:   Reason for Stopping:         predniSONE (DELTASONE) 10 MG tablet Comments:   Reason for Stopping:         trazodone-dietary supp #8 50 mg Cmpk Comments:   Reason for Stopping:         URIBEL 118-10-40.8-36 mg Cap Comments:   Reason for Stopping:             Time spent on the discharge of patient: > 45 minutes    Tati Herrmann MD  Department of Hospital Medicine  [facility]

## 2017-07-06 ENCOUNTER — PATIENT OUTREACH (OUTPATIENT)
Dept: ADMINISTRATIVE | Facility: CLINIC | Age: 77
End: 2017-07-06

## 2017-07-06 RX ORDER — HYDROXYCHLOROQUINE SULFATE 200 MG/1
TABLET, FILM COATED ORAL DAILY
COMMUNITY
End: 2018-05-16

## 2017-07-06 RX ORDER — ACETAMINOPHEN 500 MG
TABLET ORAL 2 TIMES DAILY PRN
COMMUNITY
End: 2017-07-06

## 2017-07-06 RX ORDER — ACETAMINOPHEN 500 MG
1 TABLET ORAL DAILY
COMMUNITY

## 2017-07-06 RX ORDER — SENNOSIDES 8.6 MG/1
1 TABLET ORAL 2 TIMES DAILY
COMMUNITY

## 2017-07-06 RX ORDER — LORAZEPAM 1 MG/1
1 TABLET ORAL DAILY PRN
COMMUNITY

## 2017-07-06 RX ORDER — TRAMADOL HYDROCHLORIDE 50 MG/1
50 TABLET ORAL 2 TIMES DAILY PRN
COMMUNITY

## 2017-07-06 NOTE — PATIENT INSTRUCTIONS
Mechanical Fall  You have had a fall today. It appears that the cause is what we call mechanical. That means that you slipped, tripped or lost your balance. If your fall had been due to fainting or a seizure, other tests might be required.  It is normal to feel sore and tight in your muscles and back the next day, and not just the muscles you injured at first. Remember, all the parts of your body are connected, so while initially one area hurts, the next day another may hurt. Also, when you injure yourself, it causes inflammation, which then causes the muscles to tighten up and hurt more. After the initial worsening, it should gradually improve over the next few days. However, report more severe pain.  Even without a definite head injury, you can still get a concussion from your head suddenly jerking forward, backward or sideways when falling. Concussions and even bleeding can still occur, especially if you have had a recent injury or take blood thinner medicine. It is not unusual to have a mild headache and feel tired and even nauseous or dizzy.   Home care  1. Rest today and resume your normal activities when you are feeling back to normal.  2. If you were injured during the fall, follow the advice from your doctor regarding care of your injury.  3. At first, do not try to stretch out the sore spots. If there is a strain, stretching may make it worse.  Massage may help relax the muscles without stretching them.  4. You can use an ice pack or cold compress on and off to the sore spots 10 to 20 at a time, as often as you feel comfortable. This may help reduce the inflammation, swelling and pain.  5. If you have any scrapes or abrasions, they usually heal within 10 days. It is important to keep the abrasions clean while they initially start to heal. However, an infection may occur even with proper care, so watch for early signs of infection.  Medications  · Talk to your doctor before taking new medicines,  especially if you have other medical problems or are taking other medicines.  · If you need anything for pain, you can take acetaminophen or ibuprofen, unless you were given a different pain medicine to use. Talk with your doctor before using these medicines if you have chronic liver or kidney disease, or ever had a stomach ulcer or gastrointestinal bleeding, or are taking blood thinner medicines.  · Be careful if you are given prescription pain medicines, narcotics, or medicine for muscle spasm. They can make you sleepy, dizzy and can affect your coordination, reflexes and judgment. Do not drive or do work where you can injure yourself when taking them.  Fall prevention  · Was there anything that caused your fall that can be fixed, removed, or replaced?  · Make your home safe by keeping walkways clear of objects you may trip over.  · Use non-slip pads under rugs. Don't use small area rugs or throw rugs.  · Do not walk in poorly lit areas.  · Do not stand on chairs or wobbly ladders.  · Use caution when reaching overhead or looking upward. This position can cause a loss of balance.  · Be sure your shoes fit properly, have non-slip bottoms and are in good condition.  · Be cautious when going up and down curbs, and walking on uneven sidewalks.  · If your balance is poor, consider using a cane or walker.  · Stay as active as you can. Balance, flexibility, strength, and endurance all come from exercise. They all play a role in preventing falls.  · If you have pets, know where they are before you stand up or walk so you don't trip over them.  · Limit alcohol intake. Alcohol can cause balance problems and increase the risk of falls.  · Use night lights.  Follow-up  Follow up with your doctor or as advised by our staff. If X-rays or CT scans were done, you will be notified if there is a change in the reading, especially if it affects treatment.  Call 911  Call 911 if any of these occur:  · Trouble breathing  · Confused or  difficulty arousing  · Fainting or loss of consciousness  · Rapid or very slow heart rate  · Seizure  · Difficulty with speech or vision, weakness of an arm or leg  · Difficulty walking or talking, loss of balance, numbness or weakness in one side of your body, facial droop  When to seek medical advice  Call your healthcare provider right away if any of these occur:  · Repeated mechanical falls, or unexplained falls  · Dizziness  · Severe headache  · Blood in vomit, stools (black or red color)  Date Last Reviewed: 11/5/2015 © 2000-2016 [employer]. [address] [place] [address]. All rights reserved. This information is not intended as a substitute for professional medical care. Always follow your healthcare professional's instructions.

## 2017-10-31 PROBLEM — I10 ESSENTIAL HYPERTENSION: Chronic | Status: RESOLVED | Noted: 2017-06-08 | Resolved: 2017-10-31

## 2018-04-24 ENCOUNTER — OFFICE VISIT (OUTPATIENT)
Dept: CARDIOLOGY | Facility: CLINIC | Age: 78
End: 2018-04-24
Attending: INTERNAL MEDICINE
Payer: MEDICARE

## 2018-04-24 VITALS
DIASTOLIC BLOOD PRESSURE: 76 MMHG | HEIGHT: 65 IN | SYSTOLIC BLOOD PRESSURE: 139 MMHG | WEIGHT: 124 LBS | HEART RATE: 73 BPM | BODY MASS INDEX: 20.66 KG/M2

## 2018-04-24 DIAGNOSIS — Z87.898 HISTORY OF SYNCOPE: ICD-10-CM

## 2018-04-24 DIAGNOSIS — M05.762 RHEUMATOID ARTHRITIS INVOLVING BOTH KNEES WITH POSITIVE RHEUMATOID FACTOR: Chronic | ICD-10-CM

## 2018-04-24 DIAGNOSIS — I10 ESSENTIAL HYPERTENSION: ICD-10-CM

## 2018-04-24 DIAGNOSIS — E03.4 HYPOTHYROIDISM DUE TO ACQUIRED ATROPHY OF THYROID: Chronic | ICD-10-CM

## 2018-04-24 DIAGNOSIS — I45.10 RIGHT BUNDLE BRANCH BLOCK: ICD-10-CM

## 2018-04-24 DIAGNOSIS — I65.23 BILATERAL CAROTID ARTERY STENOSIS: ICD-10-CM

## 2018-04-24 DIAGNOSIS — E78.00 HYPERCHOLESTEREMIA: ICD-10-CM

## 2018-04-24 DIAGNOSIS — M05.761 RHEUMATOID ARTHRITIS INVOLVING BOTH KNEES WITH POSITIVE RHEUMATOID FACTOR: Chronic | ICD-10-CM

## 2018-04-24 PROCEDURE — 99214 OFFICE O/P EST MOD 30 MIN: CPT | Mod: S$GLB,,, | Performed by: INTERNAL MEDICINE

## 2018-04-24 RX ORDER — ASPIRIN 81 MG/1
81 TABLET ORAL DAILY
Qty: 90 TABLET | Refills: 3 | COMMUNITY
Start: 2018-04-24 | End: 2020-06-17 | Stop reason: SDUPTHER

## 2018-04-24 RX ORDER — AMLODIPINE BESYLATE 5 MG/1
5 TABLET ORAL DAILY
Qty: 90 TABLET | Refills: 3 | Status: SHIPPED | OUTPATIENT
Start: 2018-04-24 | End: 2020-06-17 | Stop reason: SDUPTHER

## 2018-04-24 NOTE — PROGRESS NOTES
Subjective:     Pearl Woodard is a 77 y.o. female with hypertension and hypercholesterolemia. She had a calcium score of zero. She had a Carotid Duplex on 10/3/2013 that revealed mild plaquing with increased velocity in the right external carotid artery. She had some heaviness in chest 1/12/2014 related to eating and taking medications. She has a dropped left foot. Worried about loosing teeth. On 4/13/2015 had syncope after voiding. Felt dizzy and fell. Had a fall due to her dropfoot 9/17/2015 but no loss of consciousness. Worried about her memory but that has settled with Namenda. She is currently undergoing further therapy for her rheumatoid arthritis. She was treated for UTI but stopped the antibiotics due to upset stomach. She had a fall 6/7/2017 and suffered a broken hip. She has had difficulty walking since and uses a walker. No exertional chest pain or exertional dyspnea. Occasionally feeling heart beating but no palpitation. Feeling fair overall.    Loss of Consciousness   The problem has been resolved. Associated symptoms include back pain and dizziness. Pertinent negatives include no abdominal pain, auditory change, chest pain, clumsiness, confusion, diaphoresis, fever, focal sensory loss, focal weakness, headaches, light-headedness, malaise/fatigue, nausea, palpitations, slurred speech, vertigo, visual change, vomiting or weakness.   Hypertension   This is a chronic problem. The current episode started more than 1 year ago. The problem is unchanged. The problem is controlled (usually 120-140/60-70 mmHg at home). Pertinent negatives include no anxiety, blurred vision, chest pain, headaches, malaise/fatigue, neck pain, orthopnea, palpitations, peripheral edema, PND, shortness of breath or sweats. There is no history of chronic renal disease.   Hyperlipidemia   This is a chronic problem. The current episode started more than 1 year ago. The problem is uncontrolled. Recent lipid tests were reviewed  and are high. She has no history of chronic renal disease, diabetes, hypothyroidism, liver disease, obesity or nephrotic syndrome. Pertinent negatives include no chest pain, focal sensory loss, focal weakness, leg pain, myalgias or shortness of breath.       Review of Systems   Constitution: Negative for diaphoresis, fever, weakness and malaise/fatigue.   HENT: Negative for nosebleeds.    Eyes: Positive for visual disturbance. Negative for blurred vision and double vision.   Cardiovascular: Positive for syncope. Negative for chest pain, claudication, dyspnea on exertion, irregular heartbeat, leg swelling, near-syncope, orthopnea, palpitations and paroxysmal nocturnal dyspnea.   Respiratory: Negative for cough, hemoptysis, shortness of breath, sputum production and wheezing.    Endocrine: Negative for cold intolerance and heat intolerance.   Hematologic/Lymphatic: Negative for bleeding problem. Does not bruise/bleed easily.   Skin: Negative for color change and rash.   Musculoskeletal: Positive for arthritis and back pain. Negative for falls, gout, myalgias, neck pain and stiffness.   Gastrointestinal: Negative for abdominal pain, heartburn, hematemesis, hematochezia, hemorrhoids, jaundice, melena, nausea and vomiting.   Genitourinary: Positive for frequency and nocturia. Negative for dysuria, hematuria and menorrhagia.   Neurological: Positive for disturbances in coordination and dizziness. Negative for excessive daytime sleepiness, focal weakness, headaches, light-headedness, loss of balance and vertigo.   Psychiatric/Behavioral: Negative for altered mental status, confusion, depression and memory loss. The patient is not nervous/anxious.    Allergic/Immunologic: Negative for hives and persistent infections.       Current Outpatient Prescriptions on File Prior to Visit   Medication Sig Dispense Refill    ACETAMINOPHEN (TYLENOL ARTHRITIS ORAL) Take by mouth 2 (two) times daily as needed.      amlodipine (NORVASC)  "5 MG tablet Take 1 tablet (5 mg total) by mouth once daily. 90 tablet 3    aspirin (ECOTRIN) 81 MG EC tablet Take 1 tablet (81 mg total) by mouth once daily. 90 tablet 3    CETIRIZINE HCL (ZYRTEC ORAL) Take 10 mg by mouth once daily.       chlorhexidine (PERIDEX) 0.12 % solution   1    cholecalciferol, vitamin D3, (VITAMIN D3) 2,000 unit Cap Take 1 capsule by mouth once daily.      CRANBERRY FRUIT EXTRACT (CRANBERRY EXTRACT ORAL) Take 2 tablets by mouth once daily.      folic acid (FOLVITE) 1 MG tablet once daily.       hydroxychloroquine (PLAQUENIL) 200 mg tablet Take by mouth once daily. Per [facility]      lactulose (CHRONULAC) 20 gram/30 mL Soln Take 30 mLs (20 g total) by mouth daily as needed. 480 mL 0    levothyroxine (SYNTHROID) 25 MCG tablet TAKE 1 TABLET BY MOUTH EVERY DAY 30 tablet 11    levothyroxine (SYNTHROID) 50 MCG tablet Take 25 mcg by mouth before breakfast.   5    lorazepam (ATIVAN) 1 MG tablet Take 1 mg by mouth daily as needed for Anxiety.      memantine (NAMENDA) 10 MG Tab Take 28 mg by mouth once daily. Per [facility]      methotrexate 2.5 MG Tab Take 2.5 mg by mouth every 7 days. Takes 8 tabs, every Wednesday  0    mirabegron (MYRBETRIQ) 50 mg Tb24 Take by mouth every evening.      montelukast (SINGULAIR) 10 mg tablet Take 10 mg by mouth every evening.      POLYETHYLENE GLYCOL 3350 (MIRALAX ORAL) Take by mouth as needed.      ranitidine (ZANTAC) 150 MG capsule Take 150 mg by mouth once daily. Per [facility]      senna (SENOKOT) 8.6 mg tablet Take 1 tablet by mouth 2 (two) times daily. Per [facility]      sertraline (ZOLOFT) 100 MG tablet 25 mg once daily.       tramadol (ULTRAM) 50 mg tablet Take 50 mg by mouth 2 (two) times daily as needed for Pain.      UNABLE TO FIND 1 tablet once daily. ANNETTE RED       No current facility-administered medications on file prior to visit.        /76   Pulse 73   Ht 5' 5" (1.651 m)  "  Wt 56.2 kg (124 lb)   BMI 20.63 kg/m²       Objective:     Physical Exam   Constitutional: She is oriented to person, place, and time. She appears well-developed and well-nourished. She does not appear ill. No distress.   HENT:   Head: Normocephalic and atraumatic.   Nose: Nose normal.   Eyes: Right eye exhibits no discharge. Left eye exhibits no discharge. Right conjunctiva is not injected. Left conjunctiva is not injected. No scleral icterus. Right pupil is round. Left pupil is round. Pupils are equal.   Neck: Neck supple. No JVD present. Carotid bruit is not present. No thyromegaly present.   Cardiovascular: Normal rate, regular rhythm, S1 normal and S2 normal.   No extrasystoles are present. Exam reveals no gallop, no S3 and no S4.    No murmur heard.  Pulses:       Radial pulses are 2+ on the right side, and 2+ on the left side.        Dorsalis pedis pulses are 2+ on the right side, and 2+ on the left side.        Posterior tibial pulses are 2+ on the right side, and 2+ on the left side.   Pulmonary/Chest: Effort normal and breath sounds normal.   Abdominal: Soft. Normal appearance. There is no hepatosplenomegaly. There is no tenderness.   Musculoskeletal:        Right ankle: She exhibits no swelling, no ecchymosis and no deformity.        Left ankle: She exhibits no swelling, no ecchymosis and no deformity.   Lymphadenopathy:        Head (right side): No submandibular adenopathy present.        Head (left side): No submandibular adenopathy present.     She has no cervical adenopathy.   Neurological: She is alert and oriented to person, place, and time. She is not disoriented. No cranial nerve deficit or sensory deficit.   Skin: Skin is warm, dry and intact. No rash noted. She is not diaphoretic. Nails show no clubbing.   Psychiatric: She has a normal mood and affect. Her speech is normal and behavior is normal. Judgment and thought content normal. Cognition and memory are impaired.       Assessment:     1.  Bilateral carotid artery stenosis    2. History of syncope    3. Right bundle branch block    4. Essential hypertension    5. Hypercholesteremia    6. Hypothyroidism due to acquired atrophy of thyroid    7. Rheumatoid arthritis involving both knees with positive rheumatoid factor        Plan:     1. Carotid Artery Disease   10/3/2013: Carotid Duplex: EVERT: Mild. RECA: 2.0 m/s. LICA: Mild.   10/28/2015: Carotid Duplex: EVERT: Moderate plaquing. LICA: Moderate plaquing.   10/24/2017: Carotid Duplex: EVERT: Moderate plaquing - <50%. LICA: Severe plaquing - 50-60%.   On aspirin 81 mg Q24.     Stable.   10/2018: Plan next Carotid Duplex. Then yearly.    2. History of Syncope   4/13/2015: Syncope.   Related to bathroom visit.   Reassurance.    3. Right Bundle Branch Block   2/6/2015: Diagnosed with right bundle branch block.     4. Hypertension   2008: Diagnosed.   On amlodipine 5 mg Q24.   Keeping log at home.   Well controlled.    5. Hypercholesterolemia   8/5/2013: Chol 277. HDL 75. . .   Not treated in setting of calcium score of 0 and high HDL with intolerance to statin.   9/24/2015: Chol 221. HDL 60. . TG 81.    Reassurance.    6. Hypothyroidism   2013: Diagnosed.   Levothyroxine 25 mcg Q24.    7. Rheumatic Arthritis   2005: Diagnosed.   On methotrexate.   Dr. Omar Howe.    8. Mental Decline   2017: Appears to have been diagnosed with dementia.   On Namenda.    9. Primary Care   Dr. Jarad Daniels     F/u 6 months.    Tea Weeks M.D.      10/4/2018 10:02 AM, Addendum:    10/3/2018: Carotid Duplex: EVERT: Moderate plaquing - <50%. LICA: Moderate plaquing - <50%.    I discussed the above test result and the implications of the findings over the phone.    Tea Weeks M.D.

## 2018-04-25 ENCOUNTER — HOSPITAL ENCOUNTER (INPATIENT)
Facility: HOSPITAL | Age: 78
LOS: 1 days | Discharge: HOME HEALTH | DRG: 390 | End: 2018-04-27
Attending: EMERGENCY MEDICINE | Admitting: SURGERY
Payer: MEDICARE

## 2018-04-25 DIAGNOSIS — Z01.89 ENCOUNTER FOR IMAGING STUDY TO CONFIRM NASOGASTRIC TUBE PLACEMENT: ICD-10-CM

## 2018-04-25 DIAGNOSIS — K56.609 INTESTINAL OBSTRUCTION, UNSPECIFIED CAUSE, UNSPECIFIED WHETHER PARTIAL OR COMPLETE: Primary | ICD-10-CM

## 2018-04-25 LAB
ALBUMIN SERPL BCP-MCNC: 4.2 G/DL
ALP SERPL-CCNC: 76 U/L
ALT SERPL W/O P-5'-P-CCNC: 19 U/L
ANION GAP SERPL CALC-SCNC: 8 MMOL/L
AST SERPL-CCNC: 28 U/L
BACTERIA #/AREA URNS HPF: ABNORMAL /HPF
BASOPHILS # BLD AUTO: 0.02 K/UL
BASOPHILS NFR BLD: 0.2 %
BILIRUB SERPL-MCNC: 0.9 MG/DL
BILIRUB UR QL STRIP: NEGATIVE
BUN SERPL-MCNC: 14 MG/DL
CALCIUM SERPL-MCNC: 10 MG/DL
CHLORIDE SERPL-SCNC: 100 MMOL/L
CLARITY UR: CLEAR
CO2 SERPL-SCNC: 28 MMOL/L
COLOR UR: YELLOW
CREAT SERPL-MCNC: 0.8 MG/DL
DIFFERENTIAL METHOD: ABNORMAL
EOSINOPHIL # BLD AUTO: 0 K/UL
EOSINOPHIL NFR BLD: 0 %
ERYTHROCYTE [DISTWIDTH] IN BLOOD BY AUTOMATED COUNT: 14.7 %
EST. GFR  (AFRICAN AMERICAN): >60 ML/MIN/1.73 M^2
EST. GFR  (NON AFRICAN AMERICAN): >60 ML/MIN/1.73 M^2
GLUCOSE SERPL-MCNC: 121 MG/DL
GLUCOSE UR QL STRIP: NEGATIVE
HCT VFR BLD AUTO: 44.4 %
HGB BLD-MCNC: 14.9 G/DL
HGB UR QL STRIP: ABNORMAL
HYALINE CASTS #/AREA URNS LPF: 0 /LPF
KETONES UR QL STRIP: ABNORMAL
LEUKOCYTE ESTERASE UR QL STRIP: NEGATIVE
LIPASE SERPL-CCNC: 13 U/L
LYMPHOCYTES # BLD AUTO: 0.9 K/UL
LYMPHOCYTES NFR BLD: 9 %
MCH RBC QN AUTO: 34.1 PG
MCHC RBC AUTO-ENTMCNC: 33.6 G/DL
MCV RBC AUTO: 102 FL
MICROSCOPIC COMMENT: ABNORMAL
MONOCYTES # BLD AUTO: 0.5 K/UL
MONOCYTES NFR BLD: 5.7 %
NEUTROPHILS # BLD AUTO: 8.1 K/UL
NEUTROPHILS NFR BLD: 84.9 %
NITRITE UR QL STRIP: NEGATIVE
NON-SQ EPI CELLS #/AREA URNS HPF: 1 /HPF
PH UR STRIP: 6 [PH] (ref 5–8)
PLATELET # BLD AUTO: 248 K/UL
PMV BLD AUTO: 9.7 FL
POTASSIUM SERPL-SCNC: 4.1 MMOL/L
PROT SERPL-MCNC: 7.5 G/DL
PROT UR QL STRIP: ABNORMAL
RBC # BLD AUTO: 4.37 M/UL
RBC #/AREA URNS HPF: 60 /HPF (ref 0–4)
SODIUM SERPL-SCNC: 136 MMOL/L
SP GR UR STRIP: 1.02 (ref 1–1.03)
URN SPEC COLLECT METH UR: ABNORMAL
UROBILINOGEN UR STRIP-ACNC: ABNORMAL EU/DL
WBC # BLD AUTO: 9.52 K/UL
WBC #/AREA URNS HPF: 5 /HPF (ref 0–5)

## 2018-04-25 PROCEDURE — 80053 COMPREHEN METABOLIC PANEL: CPT

## 2018-04-25 PROCEDURE — 87086 URINE CULTURE/COLONY COUNT: CPT

## 2018-04-25 PROCEDURE — 96375 TX/PRO/DX INJ NEW DRUG ADDON: CPT

## 2018-04-25 PROCEDURE — 96361 HYDRATE IV INFUSION ADD-ON: CPT

## 2018-04-25 PROCEDURE — 25000003 PHARM REV CODE 250: Performed by: EMERGENCY MEDICINE

## 2018-04-25 PROCEDURE — 81000 URINALYSIS NONAUTO W/SCOPE: CPT

## 2018-04-25 PROCEDURE — 83690 ASSAY OF LIPASE: CPT

## 2018-04-25 PROCEDURE — 96372 THER/PROPH/DIAG INJ SC/IM: CPT

## 2018-04-25 PROCEDURE — 96374 THER/PROPH/DIAG INJ IV PUSH: CPT

## 2018-04-25 PROCEDURE — 63600175 PHARM REV CODE 636 W HCPCS: Performed by: EMERGENCY MEDICINE

## 2018-04-25 PROCEDURE — 99285 EMERGENCY DEPT VISIT HI MDM: CPT

## 2018-04-25 PROCEDURE — 85025 COMPLETE CBC W/AUTO DIFF WBC: CPT

## 2018-04-25 RX ORDER — SODIUM CHLORIDE 9 MG/ML
1000 INJECTION, SOLUTION INTRAVENOUS
Status: COMPLETED | OUTPATIENT
Start: 2018-04-25 | End: 2018-04-26

## 2018-04-25 RX ORDER — LORAZEPAM 2 MG/ML
1 INJECTION INTRAMUSCULAR
Status: COMPLETED | OUTPATIENT
Start: 2018-04-26 | End: 2018-04-26

## 2018-04-25 RX ORDER — ONDANSETRON 2 MG/ML
4 INJECTION INTRAMUSCULAR; INTRAVENOUS
Status: COMPLETED | OUTPATIENT
Start: 2018-04-25 | End: 2018-04-25

## 2018-04-25 RX ADMIN — SODIUM CHLORIDE 1000 ML: 0.9 INJECTION, SOLUTION INTRAVENOUS at 11:04

## 2018-04-25 RX ADMIN — ONDANSETRON 4 MG: 2 INJECTION INTRAMUSCULAR; INTRAVENOUS at 09:04

## 2018-04-26 PROBLEM — K56.609 INTESTINAL OBSTRUCTION: Status: ACTIVE | Noted: 2018-04-26

## 2018-04-26 PROCEDURE — 25000003 PHARM REV CODE 250: Performed by: EMERGENCY MEDICINE

## 2018-04-26 PROCEDURE — 63600175 PHARM REV CODE 636 W HCPCS: Performed by: EMERGENCY MEDICINE

## 2018-04-26 PROCEDURE — 25500020 PHARM REV CODE 255: Performed by: EMERGENCY MEDICINE

## 2018-04-26 PROCEDURE — 11000001 HC ACUTE MED/SURG PRIVATE ROOM

## 2018-04-26 PROCEDURE — 25000003 PHARM REV CODE 250: Performed by: SURGERY

## 2018-04-26 RX ORDER — GLYCERIN 1 G/1
1 SUPPOSITORY RECTAL ONCE
Status: COMPLETED | OUTPATIENT
Start: 2018-04-26 | End: 2018-04-26

## 2018-04-26 RX ORDER — ONDANSETRON 8 MG/1
8 TABLET, ORALLY DISINTEGRATING ORAL EVERY 8 HOURS PRN
Status: DISCONTINUED | OUTPATIENT
Start: 2018-04-26 | End: 2018-04-28 | Stop reason: HOSPADM

## 2018-04-26 RX ORDER — HYDRALAZINE HYDROCHLORIDE 20 MG/ML
10 INJECTION INTRAMUSCULAR; INTRAVENOUS EVERY 6 HOURS PRN
Status: DISCONTINUED | OUTPATIENT
Start: 2018-04-26 | End: 2018-04-28 | Stop reason: HOSPADM

## 2018-04-26 RX ORDER — ACETAMINOPHEN 650 MG/1
650 SUPPOSITORY RECTAL EVERY 4 HOURS PRN
Status: DISCONTINUED | OUTPATIENT
Start: 2018-04-26 | End: 2018-04-28 | Stop reason: HOSPADM

## 2018-04-26 RX ORDER — DIPHENHYDRAMINE HYDROCHLORIDE 50 MG/ML
25 INJECTION INTRAMUSCULAR; INTRAVENOUS EVERY 4 HOURS PRN
Status: DISCONTINUED | OUTPATIENT
Start: 2018-04-26 | End: 2018-04-28 | Stop reason: HOSPADM

## 2018-04-26 RX ORDER — DEXTROSE MONOHYDRATE, SODIUM CHLORIDE, AND POTASSIUM CHLORIDE 50; 1.49; 9 G/1000ML; G/1000ML; G/1000ML
INJECTION, SOLUTION INTRAVENOUS CONTINUOUS
Status: DISCONTINUED | OUTPATIENT
Start: 2018-04-26 | End: 2018-04-28 | Stop reason: HOSPADM

## 2018-04-26 RX ORDER — LABETALOL HYDROCHLORIDE 5 MG/ML
10 INJECTION, SOLUTION INTRAVENOUS EVERY 6 HOURS PRN
Status: DISCONTINUED | OUTPATIENT
Start: 2018-04-26 | End: 2018-04-28 | Stop reason: HOSPADM

## 2018-04-26 RX ORDER — MORPHINE SULFATE 10 MG/ML
1 INJECTION INTRAMUSCULAR; INTRAVENOUS; SUBCUTANEOUS EVERY 4 HOURS PRN
Status: DISCONTINUED | OUTPATIENT
Start: 2018-04-26 | End: 2018-04-28 | Stop reason: HOSPADM

## 2018-04-26 RX ORDER — SODIUM CHLORIDE 0.9 % (FLUSH) 0.9 %
3 SYRINGE (ML) INJECTION
Status: DISCONTINUED | OUTPATIENT
Start: 2018-04-26 | End: 2018-04-28 | Stop reason: HOSPADM

## 2018-04-26 RX ORDER — MORPHINE SULFATE 10 MG/ML
2 INJECTION INTRAMUSCULAR; INTRAVENOUS; SUBCUTANEOUS EVERY 4 HOURS PRN
Status: DISCONTINUED | OUTPATIENT
Start: 2018-04-26 | End: 2018-04-28 | Stop reason: HOSPADM

## 2018-04-26 RX ORDER — SODIUM CHLORIDE 9 MG/ML
500 INJECTION, SOLUTION INTRAVENOUS
Status: COMPLETED | OUTPATIENT
Start: 2018-04-26 | End: 2018-04-26

## 2018-04-26 RX ORDER — RAMELTEON 8 MG/1
8 TABLET ORAL NIGHTLY PRN
Status: DISCONTINUED | OUTPATIENT
Start: 2018-04-26 | End: 2018-04-28 | Stop reason: HOSPADM

## 2018-04-26 RX ORDER — HALOPERIDOL 5 MG/ML
5 INJECTION INTRAMUSCULAR
Status: COMPLETED | OUTPATIENT
Start: 2018-04-26 | End: 2018-04-26

## 2018-04-26 RX ADMIN — HALOPERIDOL LACTATE 5 MG: 5 INJECTION, SOLUTION INTRAMUSCULAR at 02:04

## 2018-04-26 RX ADMIN — DEXTROSE MONOHYDRATE, SODIUM CHLORIDE, AND POTASSIUM CHLORIDE: 50; 9; 1.49 INJECTION, SOLUTION INTRAVENOUS at 05:04

## 2018-04-26 RX ADMIN — SODIUM PHOSPHATE, DIBASIC AND SODIUM PHOSPHATE, MONOBASIC 1 ENEMA: 7; 19 ENEMA RECTAL at 03:04

## 2018-04-26 RX ADMIN — DEXTROSE MONOHYDRATE, SODIUM CHLORIDE, AND POTASSIUM CHLORIDE: 50; 9; 1.49 INJECTION, SOLUTION INTRAVENOUS at 06:04

## 2018-04-26 RX ADMIN — RAMELTEON 8 MG: 8 TABLET, FILM COATED ORAL at 08:04

## 2018-04-26 RX ADMIN — MORPHINE SULFATE 2 MG: 10 INJECTION INTRAVENOUS at 08:04

## 2018-04-26 RX ADMIN — IOHEXOL 70 ML: 350 INJECTION, SOLUTION INTRAVENOUS at 12:04

## 2018-04-26 RX ADMIN — SODIUM CHLORIDE 500 ML: 0.9 INJECTION, SOLUTION INTRAVENOUS at 12:04

## 2018-04-26 RX ADMIN — LORAZEPAM 1 MG: 2 INJECTION INTRAMUSCULAR; INTRAVENOUS at 12:04

## 2018-04-26 RX ADMIN — GLYCERIN 1 SUPPOSITORY: 2.1 SUPPOSITORY RECTAL at 12:04

## 2018-04-26 NOTE — PROGRESS NOTES
[facility]  Adult Nutrition  Progress Note    SUMMARY       Recommendations    Recommendation/Intervention: 1) If unable to advance diet, recommend ppn:  Clinimix 4.25/10 @ 80 cc/hr with 250 mL 20% IV Lipids daily, provides 1479, 81 g, 1920 cc IV Fluid. 2) Monitor electrolytes, replete prn. 3) Monitor Triglycerides. 4) When medically feasible advance patient to a Clear Liquid diet with Boost Breeze peach TID 5) RD to monitor  Goals: 1) patient to receive diet order or nutrition support within 24 - 48 hours  Nutrition Goal Status: new  Communication of RD Recs: reviewed with RN (care plan/notes )    Reason for Assessment    Reason for Assessment: identified at risk by screening criteria (MST, N/R (NGT placement))  Diagnosis: other (see comments) (NGT placement, intestinal obstruction (partial or complete))  Relevant Medical History: HTN, dementia with behavioral disturbances  General Information Comments: Patient NPO secondary to po intake intolerance. + N/V.  IVF infusing. +NGT. Rectal exam performed, retained stool present however not impacted. Enema administered for stool clearance.  at bedside. Reports patient has a good appetite, no recent weight loss. Minimal weight loss noted per records over past year. Denies patient having chewing or swallowing difficulties. RN reports small, soft bowel movement today. No blood in stool.     Nutrition Discharge Planning: Patient to discharge irvin Regular diet (GI soft/bland restricition if warranted)    Nutrition Risk Screen    Nutrition Risk Screen: other (see comments) (NGT present)    Nutrition/Diet History    Patient Reported Diet/Restrictions/Preferences: general  Do you have any cultural, spiritual, Presybeterian conflicts, given your current situation?: No cultural, Presybeterian or ethnic food preferences.   Food Allergies: NKFA  Factors Affecting Nutritional Intake: abdominal pain, altered gastrointestinal function, nausea/vomiting,  "NPO    Anthropometrics    Temp: 98.2 °F (36.8 °C)  Height Method: Stated  Height: 5' 5" (165.1 cm)  Height (inches): 65 in  Weight Method: Bed Scale  Weight: 57.6 kg (126 lb 15.8 oz)  Weight (lb): 126.99 lb  Ideal Body Weight (IBW), Female: 125 lb  % Ideal Body Weight, Female (lb): 101.59 lb  BMI (Calculated): 21.2  BMI Grade: 18.5-24.9 - normal  Weight Loss: unintentional  Usual Body Weight (UBW), k.1 kg  Weight Change Amount: 5 lb 8.2 oz (over 11 months)  % Usual Body Weight: 96.04  % Weight Change From Usual Weight: -4.16 %       Lab/Procedures/Meds    Pertinent Labs Reviewed: reviewed  Pertinent Labs Comments: Alb 4.2 (WNL)  Pertinent Medications Reviewed: reviewed  Pertinent Medications Comments: IVF, Enema, anti-emetic    Physical Findings/Assessment    Overall Physical Appearance: advanced age, lethargic  Tubes: nasogastric tube (to suction)  Oral/Mouth Cavity: WDL  Skin: intact (Vinnie Score 19)    Estimated/Assessed Needs    Weight Used For Calorie Calculations: 57.6 kg (126 lb 15.8 oz)     Energy Need Method: [place]-St Jeor (1485 (RMR x 1.4))  Protein Requirements: 70 - 80 g  Weight Used For Protein Calculations: 57.6 kg (126 lb 15.8 oz)  Fluid Need Method: RDA Method, other (see comments) (or per MD)     CHO Requirement: 160 g      Nutrition Prescription Ordered    Current Diet Order: NPO    Evaluation of Received Nutrient/Fluid Intake    Other Calories (kcal): 510  % Kcal Needs: 34  % Protein Needs: 0  IV Fluid (mL): 3000  I/O: not recorded  Energy Calories Required: not meeting needs  Protein Required: not meeting needs  Fluid Required: meeting needs  Comments: LBM: ; 780 cc NGT output  % Intake of Estimated Energy Needs: 25 - 50 %  % Meal Intake: NPO    Nutrition Risk    Level of Risk/Frequency of Follow-up:  (F/U 2x weekly)     Assessment and Plan    Nutrition Diagnosis:  Problem: Inadequate energy intake  Etiology: altered GI function  Signs/Symptoms: NPO, +NGT to suction  Status:  new   "     Monitor and Evaluation    Food and Nutrient Intake: energy intake, food and beverage intake, parenteral nutrition intake  Food and Nutrient Adminstration: diet order, enteral and parenteral nutrition administration, other (specify) (supplement order)  Knowledge/Beliefs/Attitudes: food and nutrition knowledge/skill, beliefs and attitudes  Physical Activity and Function: nutrition-related ADLs and IADLs  Anthropometric Measurements: weight, weight change, body mass index  Biochemical Data, Medical Tests and Procedures: electrolyte and renal panel, gastrointestinal profile, glucose/endocrine profile, inflammatory profile, lipid profile  Nutrition-Focused Physical Findings: overall appearance, skin     Nutrition Follow-Up    RD Follow-up?: Yes

## 2018-04-26 NOTE — PLAN OF CARE
Problem: Patient Care Overview  Goal: Plan of Care Review  04/26/2018    Recommendations    Recommendation/Intervention: 1) If unable to advance diet, recommend ppn:  Clinimix 4.25/10 @ 80 cc/hr with 250 mL 20% IV Lipids daily, provides 1479, 81 g, 1920 cc IV Fluid. 2) Monitor electrolytes, replete prn. 3) Monitor Triglycerides. 4) When medically feasible advance patient to a Clear Liquid diet with Boost Breeze peach TID 5) RD to monitor  Goals: 1) patient to receive diet order or nutrition support within 24 - 48 hours  Nutrition Goal Status: new  Communication of RD Recs: reviewed with RN (care plan/notes )    Dilia Alexis, MPH, RD, LDN

## 2018-04-26 NOTE — PLAN OF CARE
04/26/18 1150   Discharge Assessment   Assessment Type Discharge Planning Assessment   Confirmed/corrected address and phone number on facesheet? Yes   Assessment information obtained from? Medical Record   Prior to hospitilization cognitive status: Unable to Assess   Prior to hospitalization functional status: Assistive Equipment;Needs Assistance   Current cognitive status: Unable to Assess   Current Functional Status: Assistive Equipment;Needs Assistance   Facility Arrived From: home   Lives With spouse  (sitter 2 times a week)   Able to Return to Prior Arrangements unable to determine at this time (comments)   Is patient able to care for self after discharge? No   Who are your caregiver(s) and their phone number(s)? spouse Osmany [phone]   Patient's perception of discharge disposition other (comments)  (TBD)   Readmission Within The Last 30 Days no previous admission in last 30 days   Patient currently being followed by outpatient case management? No   Patient currently receives any other outside agency services? Yes   Equipment Currently Used at Home walker, rolling;wheelchair   Do you have any problems affording any of your prescribed medications? No   Is the patient taking medications as prescribed? yes   Does the patient have transportation home? Yes   Discharge Plan A Home with family;Home Health;Other  (private sitters)   Discharge Plan B Other  (TBD)   Patient/Family In Agreement With Plan unable to assess   Readmission Questionnaire   Have you felt down, depressed, or hopeless? Unable to Assess   Have you felt little interest or pleasure in doing things? Unable to assess     [facility] [place] [address]  [address]  [place] [address]  Phone: [phone] Fax: [phone]    [facility] - [place], [place] - [address]  [address]  [address]  [facility] [address]  Phone: [phone] Fax: [phone]

## 2018-04-26 NOTE — H&P
"    [facility]         General Surgery History and Physical    04/26/2018  10:41 AM    Pearl Woodard  [id number]    SUBJECTIVE:                                                                                           Reason for Admission: Abdominal distention, nausea/vomiting, PO intake intolerance. R/O SBO.    HPI: Case of 78 y/o female with h/o dementia, hypothyroidism, RA, HTN, HLD, carotid stenosis and previous stercoral perforation requiring ex-lap, who presents to the ED with  for evaluation of 2-3 days' worth of abdominal distention, nausea, vomiting, PO intolerance and obstipation.  alleges patient has been burping, but not truly passing any flatus. He denies any fevers or chills and notes vomitus was non-bilious. He denies similar episodes in the past other than her colonic perforation (2-stage procedure).    CC: "When can I get out of here?"     ROS - All pertinent positives and negatives listed in HPI.    Objective:                                                                                                  Past Medical History:   Diagnosis Date    Asthma     Carotid stenosis 10/7/2013    10/3/2013: Carotid Duplex: EVERT: mild. RECA: 2.0 m/s. LICA: mild    History of syncope 4/16/2015 4/13/2015: Syncope.    HTN (hypertension), malignant 3/14/2013    Hypercholesteremia 3/14/2013    Hypertension     Hypothyroidism 4/16/2015 4/13/2015: Syncope.    Rheumatoid arthritis(714.0) 3/14/2013    Dr Grimes follows.    Right bundle branch block 2/6/2015 2/6/2015: Right bundle branch block noted.    Thyroid disease     Hypothyroid       Past Surgical History:   Procedure Laterality Date    BACK SURGERY      KNEE SURGERY  12/2012    After fall       History reviewed. No pertinent family history.    Social History     Social History    Marital status:      Spouse name: N/A    Number of children: N/A    Years of education: N/A     Occupational History "    Not on file.     Social History Main Topics    Smoking status: Former Smoker     Packs/day: 0.50     Years: 4.00     Start date: 1/1/1960     Quit date: 1/1/1964    Smokeless tobacco: Never Used    Alcohol use Yes      Comment: Wine socially    Drug use: No    Sexual activity: Not on file     Other Topics Concern    Not on file     Social History Narrative    No narrative on file       Vitals:    04/26/18 0750   BP: (!) 165/72   Pulse: 86   Resp: 17   Temp: 98.5 °F (36.9 °C)       Recent Labs      04/25/18   2143   WBC  9.52   HGB  14.9   HCT  44.4   PLT  248        Recent Labs      04/25/18   2143   NA  136   K  4.1   CL  100   CO2  28   BUN  14   CREATININE  0.8   GLU  121*        Imaging Results          X-Ray Abdomen AP 1 View (KUB) (Final result)  Result time 04/26/18 02:39:19    Final result by Damian Conteh MD (04/26/18 02:39:19)                 Impression:      NG tube in place.      Electronically signed by: Damian Conteh MD  Date:    04/26/2018  Time:    02:39             Narrative:    EXAMINATION:  XR ABDOMEN AP 1 VIEW    CLINICAL HISTORY:  Encounter for other specified special examinations    TECHNIQUE:  Single AP View of the abdomen was performed.    COMPARISON:  CT abdomen and pelvis from the same date.    FINDINGS:  Interval placement of NG tube which extends well below the diaphragm into the stomach.  There is abnormal dilatation of small bowel loops consistent with small-bowel obstruction as seen on earlier CT exam.  No free air identified.  Contrast excretion is seen from the kidneys.                               CT Abdomen Pelvis With Contrast (Final result)  Result time 04/26/18 00:50:54    Final result by Damian Conteh MD (04/26/18 00:50:54)                 Impression:      1. Dilatation of proximal to mid small bowel loops consistent with small-bowel obstruction with no definite mechanical cause for obstruction seen.  Distal small bowel loops are decompressed, however  transition point is difficult to identify.  2. Additional findings as detailed above.      Electronically signed by: Damian Conteh MD  Date:    04/26/2018  Time:    00:50             Narrative:    EXAMINATION:  CT ABDOMEN PELVIS WITH CONTRAST    CLINICAL HISTORY:  Abdominal pain, unspecified;    TECHNIQUE:  Low dose axial images, sagittal and coronal reformations were obtained from the lung bases to the pubic symphysis following the IV administration of 70 mL of Omnipaque 350 .  Oral contrast was not given.    COMPARISON:  None.    FINDINGS:  The visualized portion of the heart is unremarkable.  Mild atelectatic changes are seen at the lung bases.    The liver is normal in size, contour, and attenuation with no focal hepatic abnormality.  There is no intra-or extrahepatic biliary ductal dilatation.  Gallbladder has been removed.  The pancreas, spleen, and adrenal glands are unremarkable.    Kidneys are functioning.  No evidence of hydronephrosis.  Ureters and urinary bladder are unremarkable.  Uterus is small or has been removed.    There is abnormal dilatation of proximal to mid small bowel loops measuring upwards of 4.5 cm in diameter consistent with small-bowel obstruction.  Transition point is difficult to identify and no definite mechanical cause for obstruction is seen.  Distal small bowel loops are decompressed.  Mild scattered stool is seen within the colon.  No free air or free fluid.    Aorta tapers normally with mild atherosclerosis.    Degenerative changes are seen in the spine with posterior instrumented lumbar fusion at the L4-5 level.  Postsurgical ORIF changes with intramedullary gely seen within the partially visualized left femur.  Subcutaneous soft tissue structures are unremarkable.                                PHYSICAL EXAM:    GEN: Alert and awake. NAD. Appears well-nourished. No apparent pain/discomfort at rest.   HEENT: NC. AT. EOMI. Anicteric sclera. NGT in place and draining light  brown-tinged gastric content. MOM.  RESP: CTAB. No rhonchi, crackles or rales. No SOB, dyspnea or tachypnea.   CV: RRR.   ABD: Non-distended. Well-healed laparotomy and ostomy incisions seen. Abdomen is soft, depressible and entirely non-tender to palpation. Hypoactive bowel sounds with occasional borborygmi. No rebound tenderness or peritoneal signs.   RECTAL: Adequate tone. Abundant amount of stool within rectal vault. No masses or strictures appreciated. No gross blood either.   EXT: No asymmetric swelling or calf tenderness.  MSK: Some generalized loss of muscle bulk. Adequate tone.   SKIN: Thin and dry. Occasional bruising/ecchymosis noted at IV puncture sites. No rashes, lacerations or excoriations seen. No decubitus pressure ulcer present.   PSYCH: Adequate mood. Does not have good insight into present disease process.  NEURO: CN II-XII grossly normal. Able to follow commands.     ASSESSMENT / PLAN:                                                                             Case of 77 y.o. female admitted overnight with the above history and findings. No acute events since admission other than some pulling of PIV's. Pt is afebrile and hemodynamically stable with vitals remarkable for systolic hypertension. On exam, abdomen is not distended, but there are some high-pitched bowel sounds. Rectal exam reveals abundant amount of retained stool that isn't impacted or hard, but may be contributing to present symptomatology. Plan for today:    - continue to monitor NGT output  - Glycerin suppository this AM  - possible enema to help with stool clearance later today  - possible SBFT later today if patient fails to progress with NGT decompression  - improve BP control with IV prn        Anjana Romero MD  [facility] Surgery PGY-IV

## 2018-04-26 NOTE — ED PROVIDER NOTES
Encounter Date: 4/25/2018       History     Chief Complaint   Patient presents with    Abdominal Pain     Pt reports abd pain with n/v times 2 days.  Seen at urgent care and discharged with Zofran.  Told if no better to come to ER for a CT.      77-year-old female since for evaluation of lower abdominal pain for the past 2 days.  Patient reports having a recent Botox procedure for her overactive bladder.  Denies fever or chills but does nausea and vomiting.  Sensation seen in urgent care earlier today and given prescription for Zofran which relieved her symptoms temporarily but they have since returned.  She reports being unable to provide urine when she was seen at urgent care earlier today.          Review of patient's allergies indicates:   Allergen Reactions    Statins-hmg-coa reductase inhibitors Other (See Comments)     2013: Severe muscle pain.    Demerol [meperidine]     Sulfa (sulfonamide antibiotics)      Past Medical History:   Diagnosis Date    Asthma     Carotid stenosis 10/7/2013    10/3/2013: Carotid Duplex: EVERT: mild. RECA: 2.0 m/s. LICA: mild    History of syncope 4/16/2015 4/13/2015: Syncope.    HTN (hypertension), malignant 3/14/2013    Hypercholesteremia 3/14/2013    Hypertension     Hypothyroidism 4/16/2015 4/13/2015: Syncope.    Rheumatoid arthritis(714.0) 3/14/2013    Dr Grimes follows.    Right bundle branch block 2/6/2015 2/6/2015: Right bundle branch block noted.    Thyroid disease     Hypothyroid     Past Surgical History:   Procedure Laterality Date    BACK SURGERY      KNEE SURGERY  12/2012    After fall     No family history on file.  Social History   Substance Use Topics    Smoking status: Former Smoker     Packs/day: 0.50     Years: 4.00     Start date: 1/1/1960     Quit date: 1/1/1964    Smokeless tobacco: Never Used    Alcohol use Yes      Comment: Wine socially     Review of Systems   Constitutional: Negative for chills and fever.   HENT: Negative for  congestion and trouble swallowing.    Eyes: Negative for photophobia and visual disturbance.   Respiratory: Negative for cough, chest tightness and shortness of breath.    Cardiovascular: Negative for chest pain and palpitations.   Gastrointestinal: Positive for abdominal pain, nausea and vomiting. Negative for blood in stool, constipation and diarrhea.   Endocrine: Negative for polyuria.   Genitourinary: Positive for decreased urine volume and difficulty urinating. Negative for dysuria.   Musculoskeletal: Positive for back pain and myalgias.   Neurological: Negative for dizziness, syncope, light-headedness and numbness.       Physical Exam     Initial Vitals [04/25/18 2100]   BP Pulse Resp Temp SpO2   (!) 205/83 84 18 98.2 °F (36.8 °C) 96 %      MAP       123.67         Physical Exam    Nursing note and vitals reviewed.  Constitutional: She is not diaphoretic. No distress.   HENT:   Head: Normocephalic and atraumatic.   Mouth/Throat: No oropharyngeal exudate.   Eyes: EOM are normal. Pupils are equal, round, and reactive to light.   Neck: Normal range of motion. Neck supple. No JVD present.   Cardiovascular: Normal rate, regular rhythm and normal heart sounds.   Pulmonary/Chest: Breath sounds normal. No respiratory distress. She has no wheezes. She has no rhonchi. She has no rales.   Abdominal: Soft. She exhibits distension (suprapubic). There is tenderness (suprapubic). There is no rebound and no guarding.   Musculoskeletal: Normal range of motion. She exhibits no edema or tenderness.   Neurological: She is alert. No cranial nerve deficit or sensory deficit.   Skin: Skin is warm and dry.   Psychiatric: She has a normal mood and affect.         ED Course   Procedures  Labs Reviewed   CULTURE, URINE   CBC W/ AUTO DIFFERENTIAL   COMPREHENSIVE METABOLIC PANEL   LIPASE   URINALYSIS             Medical Decision Making:   Differential Diagnosis:   Retention  UTI  GERD  Gastritis  Musculoskeletal  pain  Nephrolithiasis  Diverticulitis  ED Management:  Afebrile no acute distress at time of history and physical.  Patient to be hypertensive.  Patient reports being unable to take her blood pressure medication due to nausea and vomiting.  Abdominal exam notable for suprapubic distention and tenderness. Bladder scan showed only 430 cc of urine.  Abdominal x-ray notable for findings concerning for small bowel obstruction.  CT scan abdomen and pelvis demonstrates dilated loops of small bowel consistent with small-bowel obstruction.  NG tube placed.  Patient be admitted to General surgery for further management of small-bowel obstruction  Other:   I have discussed this case with another health care provider.       <> Summary of the Discussion: Consult: I have spoken with General surgery on call regarding the patient's presentation and study results.  At this time, the recommendation is NG tube decompression, NPO admission to General surgery for further management..                          Clinical Impression:   The primary encounter diagnosis was Intestinal obstruction, unspecified cause, unspecified whether partial or complete. A diagnosis of Encounter for imaging study to confirm nasogastric tube placement was also pertinent to this visit.    Disposition:   Disposition: Admitted  Condition: Fair                        Crystal Cao MD  05/02/18 [id number]

## 2018-04-26 NOTE — PROGRESS NOTES
Appointment scheduled    Follow-up Information     Donnie Clarke MD On 5/16/2018.    Specialty:  General Surgery  Why:  Wednesday at 2pm.   Contact information:  [address] [place] [id number] [phone]

## 2018-04-27 LAB
ANION GAP SERPL CALC-SCNC: 4 MMOL/L
BACTERIA UR CULT: NO GROWTH
BASOPHILS # BLD AUTO: 0.03 K/UL
BASOPHILS NFR BLD: 0.3 %
BUN SERPL-MCNC: 7 MG/DL
CALCIUM SERPL-MCNC: 8 MG/DL
CHLORIDE SERPL-SCNC: 112 MMOL/L
CO2 SERPL-SCNC: 23 MMOL/L
CREAT SERPL-MCNC: 0.5 MG/DL
DIFFERENTIAL METHOD: ABNORMAL
EOSINOPHIL # BLD AUTO: 0.1 K/UL
EOSINOPHIL NFR BLD: 1.5 %
ERYTHROCYTE [DISTWIDTH] IN BLOOD BY AUTOMATED COUNT: 14.8 %
EST. GFR  (AFRICAN AMERICAN): >60 ML/MIN/1.73 M^2
EST. GFR  (NON AFRICAN AMERICAN): >60 ML/MIN/1.73 M^2
GLUCOSE SERPL-MCNC: 115 MG/DL
HCT VFR BLD AUTO: 36.9 %
HGB BLD-MCNC: 12.2 G/DL
LYMPHOCYTES # BLD AUTO: 1.8 K/UL
LYMPHOCYTES NFR BLD: 20.9 %
MAGNESIUM SERPL-MCNC: 1.7 MG/DL
MCH RBC QN AUTO: 34.2 PG
MCHC RBC AUTO-ENTMCNC: 33.1 G/DL
MCV RBC AUTO: 103 FL
MONOCYTES # BLD AUTO: 1.1 K/UL
MONOCYTES NFR BLD: 12.5 %
NEUTROPHILS # BLD AUTO: 5.7 K/UL
NEUTROPHILS NFR BLD: 64.5 %
PHOSPHATE SERPL-MCNC: 1.5 MG/DL
PLATELET # BLD AUTO: 196 K/UL
PMV BLD AUTO: 9.3 FL
POTASSIUM SERPL-SCNC: 3.6 MMOL/L
RBC # BLD AUTO: 3.57 M/UL
SODIUM SERPL-SCNC: 139 MMOL/L
WBC # BLD AUTO: 8.82 K/UL

## 2018-04-27 PROCEDURE — 11000001 HC ACUTE MED/SURG PRIVATE ROOM

## 2018-04-27 PROCEDURE — 63600175 PHARM REV CODE 636 W HCPCS: Performed by: EMERGENCY MEDICINE

## 2018-04-27 PROCEDURE — 36415 COLL VENOUS BLD VENIPUNCTURE: CPT

## 2018-04-27 PROCEDURE — G8988 SELF CARE GOAL STATUS: HCPCS | Mod: CM

## 2018-04-27 PROCEDURE — 85025 COMPLETE CBC W/AUTO DIFF WBC: CPT

## 2018-04-27 PROCEDURE — G8987 SELF CARE CURRENT STATUS: HCPCS | Mod: CM

## 2018-04-27 PROCEDURE — 25000003 PHARM REV CODE 250: Performed by: HOSPITALIST

## 2018-04-27 PROCEDURE — 25500020 PHARM REV CODE 255: Performed by: SURGERY

## 2018-04-27 PROCEDURE — S0028 INJECTION, FAMOTIDINE, 20 MG: HCPCS | Performed by: HOSPITALIST

## 2018-04-27 PROCEDURE — 84100 ASSAY OF PHOSPHORUS: CPT

## 2018-04-27 PROCEDURE — G8989 SELF CARE D/C STATUS: HCPCS | Mod: CM

## 2018-04-27 PROCEDURE — 83735 ASSAY OF MAGNESIUM: CPT

## 2018-04-27 PROCEDURE — 97530 THERAPEUTIC ACTIVITIES: CPT

## 2018-04-27 PROCEDURE — 25000003 PHARM REV CODE 250: Performed by: EMERGENCY MEDICINE

## 2018-04-27 PROCEDURE — 97165 OT EVAL LOW COMPLEX 30 MIN: CPT

## 2018-04-27 PROCEDURE — 80048 BASIC METABOLIC PNL TOTAL CA: CPT

## 2018-04-27 RX ORDER — FAMOTIDINE 10 MG/ML
20 INJECTION INTRAVENOUS 2 TIMES DAILY
Status: DISCONTINUED | OUTPATIENT
Start: 2018-04-27 | End: 2018-04-28 | Stop reason: HOSPADM

## 2018-04-27 RX ADMIN — FAMOTIDINE 20 MG: 10 INJECTION INTRAVENOUS at 12:04

## 2018-04-27 RX ADMIN — MORPHINE SULFATE 2 MG: 10 INJECTION INTRAVENOUS at 02:04

## 2018-04-27 RX ADMIN — DEXTROSE MONOHYDRATE, SODIUM CHLORIDE, AND POTASSIUM CHLORIDE: 50; 9; 1.49 INJECTION, SOLUTION INTRAVENOUS at 02:04

## 2018-04-27 RX ADMIN — FAMOTIDINE 20 MG: 10 INJECTION INTRAVENOUS at 11:04

## 2018-04-27 RX ADMIN — DEXTROSE MONOHYDRATE, SODIUM CHLORIDE, AND POTASSIUM CHLORIDE: 50; 9; 1.49 INJECTION, SOLUTION INTRAVENOUS at 06:04

## 2018-04-27 RX ADMIN — RAMELTEON 8 MG: 8 TABLET, FILM COATED ORAL at 11:04

## 2018-04-27 RX ADMIN — DIATRIZOATE MEGLUMINE AND DIATRIZOATE SODIUM 240 ML: 660; 100 LIQUID ORAL; RECTAL at 10:04

## 2018-04-27 RX ADMIN — DEXTROSE MONOHYDRATE, SODIUM CHLORIDE, AND POTASSIUM CHLORIDE: 50; 9; 1.49 INJECTION, SOLUTION INTRAVENOUS at 10:04

## 2018-04-27 NOTE — PROGRESS NOTES
"   [facility]      General Surgery Progress Note    04/27/2018  4:56 PM      Patient: Pearl Woodard  MRN: [id number]  Admit Date: 4/25/2018  LOS: 1    Abdominal distention, N/V, PO intake intolerance. R/O SBO.    Subjective                                                                                                        "Are you going to remove this tube?" RN notes passing of a large BM and flatus after SBFT completed.     Events Overnight: None    Patient Active Problem List   Diagnosis    Hypertension    Rheumatoid arthritis    Hypercholesteremia    Carotid artery stenosis    Right bundle branch block    History of syncope    Hypothyroidism    Closed left hip fracture    Depression    Dementia without behavioral disturbance    Acute blood loss anemia    Malnutrition of moderate degree    Slow transit constipation    Intestinal obstruction       No current facility-administered medications on file prior to encounter.      Current Outpatient Prescriptions on File Prior to Encounter   Medication Sig Dispense Refill    ACETAMINOPHEN (TYLENOL ARTHRITIS ORAL) Take by mouth 2 (two) times daily as needed.      amLODIPine (NORVASC) 5 MG tablet Take 1 tablet (5 mg total) by mouth once daily. 90 tablet 3    aspirin (ECOTRIN) 81 MG EC tablet Take 1 tablet (81 mg total) by mouth once daily. 90 tablet 3    CETIRIZINE HCL (ZYRTEC ORAL) Take 10 mg by mouth once daily.       cholecalciferol, vitamin D3, (VITAMIN D3) 2,000 unit Cap Take 1 capsule by mouth once daily.      CRANBERRY FRUIT EXTRACT (CRANBERRY EXTRACT ORAL) Take 2 tablets by mouth once daily.      folic acid (FOLVITE) 1 MG tablet once daily.       hydroxychloroquine (PLAQUENIL) 200 mg tablet Take by mouth once daily. Per [facility]      levothyroxine (SYNTHROID) 25 MCG tablet TAKE 1 TABLET BY MOUTH EVERY DAY 30 tablet 11    levothyroxine (SYNTHROID) 50 MCG tablet Take 25 mcg by mouth before breakfast.  "  5    lorazepam (ATIVAN) 1 MG tablet Take 1 mg by mouth daily as needed for Anxiety.      memantine (NAMENDA) 10 MG Tab Take 28 mg by mouth once daily. Per [facility]      methotrexate 2.5 MG Tab Take 2.5 mg by mouth every 7 days. Takes 8 tabs, every Wednesday  0    mirabegron (MYRBETRIQ) 50 mg Tb24 Take by mouth every evening.      montelukast (SINGULAIR) 10 mg tablet Take 10 mg by mouth every evening.      POLYETHYLENE GLYCOL 3350 (MIRALAX ORAL) Take by mouth as needed.      senna (SENOKOT) 8.6 mg tablet Take 1 tablet by mouth 2 (two) times daily. Per [facility]      sertraline (ZOLOFT) 100 MG tablet 25 mg once daily.       tramadol (ULTRAM) 50 mg tablet Take 50 mg by mouth 2 (two) times daily as needed for Pain.      UNABLE TO FIND 1 tablet once daily. ANNETTE RED      chlorhexidine (PERIDEX) 0.12 % solution   1    lactulose (CHRONULAC) 20 gram/30 mL Soln Take 30 mLs (20 g total) by mouth daily as needed. 480 mL 0    ranitidine (ZANTAC) 150 MG capsule Take 150 mg by mouth once daily. Per [facility]         Objective                                                                                                          Vitals:    04/27/18 0838 04/27/18 1238 04/27/18 1523 04/27/18 1530   BP: 130/78 (!) 175/75 (!) 177/73 138/84   BP Location: Left arm Right arm Left arm Left arm   Patient Position: Lying Lying Lying Lying   Pulse:  82 79    Resp:  16 16    Temp:  98.5 °F (36.9 °C) 98.3 °F (36.8 °C)    TempSrc:  Oral Oral    SpO2:  98% 98%    Weight:       Height:           CBC   Recent Labs  Lab 04/25/18  2143   WBC 9.52   HGB 14.9   HCT 44.4          CHEM   Recent Labs  Lab 04/25/18  2143      K 4.1      CO2 28   BUN 14   CREATININE 0.8   *       PHYSICAL EXAM:    GEN: Alert and awake. NAD. No apparent pain/discomfort at rest.   HEENT: NC. AT. EOMI. NGT in place draining non-bilious, non-bloody gastric content.   RESP: No SOB, dyspnea or  tachypnea.   CV: RRR  ABD: Non-distended, soft, depressible and non-tender to palpation.     Assessment / Plan:                                                                                           A/P: Case of 77 y.o. F admitted for management of abdominal distention with the above findings. No acute events overnight. SBFT confirmed passage of contrast into the colon without signs of obstruction. Patient reportedly had many BM's therafter. Given low NGT output and evidence of bowel function, NGT was discontinued and patient started on sips and chips. If tolerated overnight and no issues arise, will start on CLD in the morning and ADAT.    Anjana Romero MD  [facility] Surgery PGY-IV

## 2018-04-27 NOTE — SUBJECTIVE & OBJECTIVE
Past Medical History:   Diagnosis Date    Asthma     Carotid stenosis 10/7/2013    10/3/2013: Carotid Duplex: EVERT: mild. RECA: 2.0 m/s. LICA: mild    History of syncope 4/16/2015 4/13/2015: Syncope.    HTN (hypertension), malignant 3/14/2013    Hypercholesteremia 3/14/2013    Hypertension     Hypothyroidism 4/16/2015 4/13/2015: Syncope.    Rheumatoid arthritis(714.0) 3/14/2013    Dr Grimes follows.    Right bundle branch block 2/6/2015 2/6/2015: Right bundle branch block noted.    Thyroid disease     Hypothyroid       Past Surgical History:   Procedure Laterality Date    BACK SURGERY      KNEE SURGERY  12/2012    After fall       Review of patient's allergies indicates:   Allergen Reactions    Statins-hmg-coa reductase inhibitors Other (See Comments)     2013: Severe muscle pain.    Demerol [meperidine]     Sulfa (sulfonamide antibiotics)        No current facility-administered medications on file prior to encounter.      Current Outpatient Prescriptions on File Prior to Encounter   Medication Sig    ACETAMINOPHEN (TYLENOL ARTHRITIS ORAL) Take by mouth 2 (two) times daily as needed.    amLODIPine (NORVASC) 5 MG tablet Take 1 tablet (5 mg total) by mouth once daily.    aspirin (ECOTRIN) 81 MG EC tablet Take 1 tablet (81 mg total) by mouth once daily.    CETIRIZINE HCL (ZYRTEC ORAL) Take 10 mg by mouth once daily.     cholecalciferol, vitamin D3, (VITAMIN D3) 2,000 unit Cap Take 1 capsule by mouth once daily.    CRANBERRY FRUIT EXTRACT (CRANBERRY EXTRACT ORAL) Take 2 tablets by mouth once daily.    folic acid (FOLVITE) 1 MG tablet once daily.     hydroxychloroquine (PLAQUENIL) 200 mg tablet Take by mouth once daily. Per [facility]    levothyroxine (SYNTHROID) 25 MCG tablet TAKE 1 TABLET BY MOUTH EVERY DAY    levothyroxine (SYNTHROID) 50 MCG tablet Take 25 mcg by mouth before breakfast.     lorazepam (ATIVAN) 1 MG tablet Take 1 mg by mouth daily as needed for Anxiety.     memantine (NAMENDA) 10 MG Tab Take 28 mg by mouth once daily. Per [facility]    methotrexate 2.5 MG Tab Take 2.5 mg by mouth every 7 days. Takes 8 tabs, every Wednesday    mirabegron (MYRBETRIQ) 50 mg Tb24 Take by mouth every evening.    montelukast (SINGULAIR) 10 mg tablet Take 10 mg by mouth every evening.    POLYETHYLENE GLYCOL 3350 (MIRALAX ORAL) Take by mouth as needed.    senna (SENOKOT) 8.6 mg tablet Take 1 tablet by mouth 2 (two) times daily. Per [facility]    sertraline (ZOLOFT) 100 MG tablet 25 mg once daily.     tramadol (ULTRAM) 50 mg tablet Take 50 mg by mouth 2 (two) times daily as needed for Pain.    UNABLE TO FIND 1 tablet once daily. ANNETTE RED    chlorhexidine (PERIDEX) 0.12 % solution     lactulose (CHRONULAC) 20 gram/30 mL Soln Take 30 mLs (20 g total) by mouth daily as needed.    ranitidine (ZANTAC) 150 MG capsule Take 150 mg by mouth once daily. Per [facility]     Family History     None        Social History Main Topics    Smoking status: Former Smoker     Packs/day: 0.50     Years: 4.00     Start date: 1/1/1960     Quit date: 1/1/1964    Smokeless tobacco: Never Used    Alcohol use Yes      Comment: Wine socially    Drug use: No    Sexual activity: Not on file     Review of Systems   Constitutional: Positive for appetite change.   HENT: Negative.    Eyes: Negative.    Respiratory: Negative.    Cardiovascular: Negative.    Gastrointestinal: Positive for abdominal distention, nausea and vomiting.   Endocrine: Negative.    Genitourinary: Negative.    Musculoskeletal: Positive for arthralgias.   Skin: Negative.    Neurological: Negative.    Psychiatric/Behavioral: Negative.      Objective:     Vital Signs (Most Recent):  Temp: 99.4 °F (37.4 °C) (04/27/18 0812)  Pulse: 80 (04/27/18 0812)  Resp: 18 (04/27/18 0812)  BP: 130/78 (04/27/18 0838)  SpO2: 98 % (04/27/18 0812) Vital Signs (24h Range):  Temp:  [97.4 °F (36.3 °C)-99.4 °F (37.4 °C)] 99.4  °F (37.4 °C)  Pulse:  [80-89] 80  Resp:  [17-18] 18  SpO2:  [95 %-99 %] 98 %  BP: (128-177)/(68-83) 130/78     Weight: 57.6 kg (126 lb 15.8 oz)  Body mass index is 21.13 kg/m².    Physical Exam   Constitutional: She is oriented to person, place, and time. She appears well-developed and well-nourished. No distress.   HENT:   Head: Normocephalic and atraumatic.   NGT in place    Eyes: Conjunctivae and EOM are normal.   Neck: Normal range of motion. Neck supple. No JVD present.   Cardiovascular: Normal rate, regular rhythm, normal heart sounds and intact distal pulses.    Pulmonary/Chest: Effort normal and breath sounds normal. No respiratory distress.   Abdominal: Soft. There is no tenderness. There is no rebound.   Musculoskeletal: Normal range of motion. She exhibits no edema.   Neurological: She is alert and oriented to person, place, and time.   Skin: Skin is warm and dry. Capillary refill takes less than 2 seconds.   Psychiatric: She has a normal mood and affect. Her behavior is normal.       Significant Labs:   CBC:   Recent Labs  Lab 04/25/18  2143   WBC 9.52   HGB 14.9   HCT 44.4        CMP:   Recent Labs  Lab 04/25/18  2143      K 4.1      CO2 28   *   BUN 14   CREATININE 0.8   CALCIUM 10.0   PROT 7.5   ALBUMIN 4.2   BILITOT 0.9   ALKPHOS 76   AST 28   ALT 19   ANIONGAP 8   EGFRNONAA >60     Lipase:   Recent Labs  Lab 04/25/18  2143   LIPASE 13     POCT Glucose: No results for input(s): POCTGLUCOSE in the last 48 hours.  Urine Studies:   Recent Labs  Lab 04/25/18  2235   COLORU Yellow   APPEARANCEUA Clear   PHUR 6.0   SPECGRAV 1.025   PROTEINUA 1+*   GLUCUA Negative   KETONESU Trace*   BILIRUBINUA Negative   OCCULTUA 1+*   NITRITE Negative   UROBILINOGEN 4.0-6.0*   LEUKOCYTESUR Negative   RBCUA 60*   WBCUA 5   BACTERIA Moderate*   HYALINECASTS 0       Significant Imaging:  Abd CT as read by radiology:  1. Dilatation of proximal to mid small bowel loops consistent with small-bowel  obstruction with no definite mechanical cause for obstruction seen.  Distal small bowel loops are decompressed, however transition point is difficult to identify.

## 2018-04-27 NOTE — PLAN OF CARE
Problem: Patient Care Overview  Goal: Plan of Care Review  Outcome: Ongoing (interventions implemented as appropriate)  Monitored freq.throughout the shift. Pt.resting at present with no complaints and no acute distress noted.  NG tube remains intact & connected to LIWS. Iv fluids continue in use. Call light in reach. Glycerin suppository & fleets enema given earlier today with good results. Safety sitter remains at bedside.

## 2018-04-27 NOTE — HPI
78 yo female with hypothyroidism, HTN, RA, LILIANE, and HLP presented with abdominal distention, N/V and found to have SBO. NGT placed to suction. CT findings below. Hospital medicine consulted for medical management. SBFT today.

## 2018-04-27 NOTE — PLAN OF CARE
Problem: Patient Care Overview  Goal: Plan of Care Review  Outcome: Ongoing (interventions implemented as appropriate)  Resting well this shift. Sitter at bedside for safety. IV fluids administered as ordered. NPO status maintained. NG tube with yellow/brown output. Incontinence care provided as needed. Complaint of severe pain adequately relieved with PRN medication. Remains free from falls or trauma.

## 2018-04-27 NOTE — CONSULTS
[facility] Hospital Medicine  Consult Note    Patient Name: Pearl Woodard  MRN: [id number]  Admission Date: 4/25/2018  Hospital Length of Stay: 1 days  Attending Physician: Everett Ibrahim MD   Primary Care Provider: Jarad Daniels MD           Patient information was obtained from patient, spouse/SO and ER records.     Consults  Subjective:     Principal Problem: Intestinal obstruction    Chief Complaint:   Chief Complaint   Patient presents with    Abdominal Pain     Pt reports abd pain with n/v times 2 days.  Seen at urgent care and discharged with Zofran.  Told if no better to come to ER for a CT.         HPI: 76 yo female with hypothyroidism, HTN, RA, LILIANE, and HLP presented with abdominal distention, N/V and found to have SBO. NGT placed to suction. CT findings below. Hospital medicine consulted for medical management. SBFT today.     Past Medical History:   Diagnosis Date    Asthma     Carotid stenosis 10/7/2013    10/3/2013: Carotid Duplex: EVERT: mild. RECA: 2.0 m/s. LICA: mild    History of syncope 4/16/2015 4/13/2015: Syncope.    HTN (hypertension), malignant 3/14/2013    Hypercholesteremia 3/14/2013    Hypertension     Hypothyroidism 4/16/2015 4/13/2015: Syncope.    Rheumatoid arthritis(714.0) 3/14/2013    Dr Grimes follows.    Right bundle branch block 2/6/2015 2/6/2015: Right bundle branch block noted.    Thyroid disease     Hypothyroid       Past Surgical History:   Procedure Laterality Date    BACK SURGERY      KNEE SURGERY  12/2012    After fall       Review of patient's allergies indicates:   Allergen Reactions    Statins-hmg-coa reductase inhibitors Other (See Comments)     2013: Severe muscle pain.    Demerol [meperidine]     Sulfa (sulfonamide antibiotics)        No current facility-administered medications on file prior to encounter.      Current Outpatient Prescriptions on File Prior to Encounter   Medication Sig    ACETAMINOPHEN (TYLENOL  ARTHRITIS ORAL) Take by mouth 2 (two) times daily as needed.    amLODIPine (NORVASC) 5 MG tablet Take 1 tablet (5 mg total) by mouth once daily.    aspirin (ECOTRIN) 81 MG EC tablet Take 1 tablet (81 mg total) by mouth once daily.    CETIRIZINE HCL (ZYRTEC ORAL) Take 10 mg by mouth once daily.     cholecalciferol, vitamin D3, (VITAMIN D3) 2,000 unit Cap Take 1 capsule by mouth once daily.    CRANBERRY FRUIT EXTRACT (CRANBERRY EXTRACT ORAL) Take 2 tablets by mouth once daily.    folic acid (FOLVITE) 1 MG tablet once daily.     hydroxychloroquine (PLAQUENIL) 200 mg tablet Take by mouth once daily. Per [facility]    levothyroxine (SYNTHROID) 25 MCG tablet TAKE 1 TABLET BY MOUTH EVERY DAY    levothyroxine (SYNTHROID) 50 MCG tablet Take 25 mcg by mouth before breakfast.     lorazepam (ATIVAN) 1 MG tablet Take 1 mg by mouth daily as needed for Anxiety.    memantine (NAMENDA) 10 MG Tab Take 28 mg by mouth once daily. Per [facility]    methotrexate 2.5 MG Tab Take 2.5 mg by mouth every 7 days. Takes 8 tabs, every Wednesday    mirabegron (MYRBETRIQ) 50 mg Tb24 Take by mouth every evening.    montelukast (SINGULAIR) 10 mg tablet Take 10 mg by mouth every evening.    POLYETHYLENE GLYCOL 3350 (MIRALAX ORAL) Take by mouth as needed.    senna (SENOKOT) 8.6 mg tablet Take 1 tablet by mouth 2 (two) times daily. Per [facility]    sertraline (ZOLOFT) 100 MG tablet 25 mg once daily.     tramadol (ULTRAM) 50 mg tablet Take 50 mg by mouth 2 (two) times daily as needed for Pain.    UNABLE TO FIND 1 tablet once daily. ANNETTE RED    chlorhexidine (PERIDEX) 0.12 % solution     lactulose (CHRONULAC) 20 gram/30 mL Soln Take 30 mLs (20 g total) by mouth daily as needed.    ranitidine (ZANTAC) 150 MG capsule Take 150 mg by mouth once daily. Per [facility]     Family History     None        Social History Main Topics    Smoking status: Former Smoker     Packs/day: 0.50      Years: 4.00     Start date: 1/1/1960     Quit date: 1/1/1964    Smokeless tobacco: Never Used    Alcohol use Yes      Comment: Wine socially    Drug use: No    Sexual activity: Not on file     Review of Systems   Constitutional: Positive for appetite change.   HENT: Negative.    Eyes: Negative.    Respiratory: Negative.    Cardiovascular: Negative.    Gastrointestinal: Positive for abdominal distention, nausea and vomiting.   Endocrine: Negative.    Genitourinary: Negative.    Musculoskeletal: Positive for arthralgias.   Skin: Negative.    Neurological: Negative.    Psychiatric/Behavioral: Negative.      Objective:     Vital Signs (Most Recent):  Temp: 99.4 °F (37.4 °C) (04/27/18 0812)  Pulse: 80 (04/27/18 0812)  Resp: 18 (04/27/18 0812)  BP: 130/78 (04/27/18 0838)  SpO2: 98 % (04/27/18 0812) Vital Signs (24h Range):  Temp:  [97.4 °F (36.3 °C)-99.4 °F (37.4 °C)] 99.4 °F (37.4 °C)  Pulse:  [80-89] 80  Resp:  [17-18] 18  SpO2:  [95 %-99 %] 98 %  BP: (128-177)/(68-83) 130/78     Weight: 57.6 kg (126 lb 15.8 oz)  Body mass index is 21.13 kg/m².    Physical Exam   Constitutional: She is oriented to person, place, and time. She appears well-developed and well-nourished. No distress.   HENT:   Head: Normocephalic and atraumatic.   NGT in place    Eyes: Conjunctivae and EOM are normal.   Neck: Normal range of motion. Neck supple. No JVD present.   Cardiovascular: Normal rate, regular rhythm, normal heart sounds and intact distal pulses.    Pulmonary/Chest: Effort normal and breath sounds normal. No respiratory distress.   Abdominal: Soft. There is no tenderness. There is no rebound.   Musculoskeletal: Normal range of motion. She exhibits no edema.   Neurological: She is alert and oriented to person, place, and time.   Skin: Skin is warm and dry. Capillary refill takes less than 2 seconds.   Psychiatric: She has a normal mood and affect. Her behavior is normal.       Significant Labs:   CBC:   Recent Labs  Lab  04/25/18  2143   WBC 9.52   HGB 14.9   HCT 44.4        CMP:   Recent Labs  Lab 04/25/18 2143      K 4.1      CO2 28   *   BUN 14   CREATININE 0.8   CALCIUM 10.0   PROT 7.5   ALBUMIN 4.2   BILITOT 0.9   ALKPHOS 76   AST 28   ALT 19   ANIONGAP 8   EGFRNONAA >60     Lipase:   Recent Labs  Lab 04/25/18  2143   LIPASE 13     POCT Glucose: No results for input(s): POCTGLUCOSE in the last 48 hours.  Urine Studies:   Recent Labs  Lab 04/25/18 2235   COLORU Yellow   APPEARANCEUA Clear   PHUR 6.0   SPECGRAV 1.025   PROTEINUA 1+*   GLUCUA Negative   KETONESU Trace*   BILIRUBINUA Negative   OCCULTUA 1+*   NITRITE Negative   UROBILINOGEN 4.0-6.0*   LEUKOCYTESUR Negative   RBCUA 60*   WBCUA 5   BACTERIA Moderate*   HYALINECASTS 0       Significant Imaging:  Abd CT as read by radiology:  1. Dilatation of proximal to mid small bowel loops consistent with small-bowel obstruction with no definite mechanical cause for obstruction seen.  Distal small bowel loops are decompressed, however transition point is difficult to identify.    Assessment/Plan:     * Intestinal obstruction    NGT to suction and SBFT   Defer to primary service  NPO   IVF  Pain and nausea controlled           Dementia without behavioral disturbance    Hold namenda while NPO          Depression    Hold zoloft while NPO          Hypothyroidism    IV synthroid at half dose until able to tolerate diet          Carotid artery stenosis    Resume aspirin when able to tolerate oral meds           Hypercholesteremia    Cannot tolerate statins  Resume oral supplements on dc home           Rheumatoid arthritis    PLaquenil and methotrexate resumed when able to tolerate diet          Hypertension      IV hydralazine and labetalol PRN until able tolerate oral meds           VTE Risk Mitigation         Ordered     IP VTE HIGH RISK PATIENT  Once      04/26/18 0444     Place sequential compression device  Until discontinued      04/26/18 2494               Thank you for your consult. I will follow-up with patient. Please contact us if you have any additional questions.    Maggie Shelby MD  Department of Hospital Medicine   [facility]

## 2018-04-28 VITALS
BODY MASS INDEX: 21.16 KG/M2 | RESPIRATION RATE: 18 BRPM | HEART RATE: 68 BPM | DIASTOLIC BLOOD PRESSURE: 65 MMHG | HEIGHT: 65 IN | OXYGEN SATURATION: 98 % | SYSTOLIC BLOOD PRESSURE: 149 MMHG | TEMPERATURE: 99 F | WEIGHT: 127 LBS

## 2018-04-28 PROCEDURE — 25000003 PHARM REV CODE 250: Performed by: HOSPITALIST

## 2018-04-28 PROCEDURE — 97530 THERAPEUTIC ACTIVITIES: CPT

## 2018-04-28 PROCEDURE — G8987 SELF CARE CURRENT STATUS: HCPCS | Mod: CM

## 2018-04-28 PROCEDURE — 25000003 PHARM REV CODE 250: Performed by: EMERGENCY MEDICINE

## 2018-04-28 PROCEDURE — 97165 OT EVAL LOW COMPLEX 30 MIN: CPT

## 2018-04-28 PROCEDURE — G8988 SELF CARE GOAL STATUS: HCPCS | Mod: CM

## 2018-04-28 PROCEDURE — S0028 INJECTION, FAMOTIDINE, 20 MG: HCPCS | Performed by: HOSPITALIST

## 2018-04-28 PROCEDURE — G8989 SELF CARE D/C STATUS: HCPCS | Mod: CM

## 2018-04-28 RX ORDER — LEVOTHYROXINE SODIUM ANHYDROUS 100 UG/5ML
12.5 INJECTION, POWDER, LYOPHILIZED, FOR SOLUTION INTRAVENOUS DAILY
Status: DISCONTINUED | OUTPATIENT
Start: 2018-04-28 | End: 2018-04-28 | Stop reason: HOSPADM

## 2018-04-28 RX ADMIN — POTASSIUM PHOSPHATE, MONOBASIC AND POTASSIUM PHOSPHATE, DIBASIC 30 MMOL: 224; 236 INJECTION, SOLUTION INTRAVENOUS at 10:04

## 2018-04-28 RX ADMIN — FAMOTIDINE 20 MG: 10 INJECTION INTRAVENOUS at 08:04

## 2018-04-28 RX ADMIN — LEVOTHYROXINE SODIUM ANHYDROUS 12.5 MCG: 100 INJECTION, POWDER, LYOPHILIZED, FOR SOLUTION INTRAVENOUS at 08:04

## 2018-04-28 RX ADMIN — DEXTROSE MONOHYDRATE, SODIUM CHLORIDE, AND POTASSIUM CHLORIDE: 50; 9; 1.49 INJECTION, SOLUTION INTRAVENOUS at 01:04

## 2018-04-28 NOTE — PT/OT/SLP EVAL
Occupational Therapy   Evaluation & Discharge Summary    Name: Pearl Woodard  MRN: [id number]  Admitting Diagnosis:  Intestinal obstruction      Recommendations:     Discharge Recommendations: home  Discharge Equipment Recommendations:  none  Barriers to discharge:  None    History:     Occupational Profile:  Living Environment: SSH with spouse  Previous level of function: Assist with all areas of occupation; moderate to max assist with ADLs  Roles and Routines: Self care  Equipment Owned:  walker, standard, walker, rolling, bath bench, grab bar  Assistance upon Discharge: Max Assistance with ADLs; Total with all other areas of occupation    Past Medical History:   Diagnosis Date    Asthma     Carotid stenosis 10/7/2013    10/3/2013: Carotid Duplex: EVERT: mild. RECA: 2.0 m/s. LICA: mild    History of syncope 4/16/2015 4/13/2015: Syncope.    HTN (hypertension), malignant 3/14/2013    Hypercholesteremia 3/14/2013    Hypertension     Hypothyroidism 4/16/2015 4/13/2015: Syncope.    Rheumatoid arthritis(714.0) 3/14/2013    Dr Grimes follows.    Right bundle branch block 2/6/2015 2/6/2015: Right bundle branch block noted.    Thyroid disease     Hypothyroid         Past Surgical History:   Procedure Laterality Date    BACK SURGERY      KNEE SURGERY  12/2012    After fall       Subjective     Chief Complaint: Discomfort post hip fx  Patient/Family stated goals: Get stronger  Communicated with: nurse prior to session.  Pain/Comfort:  Pain Rating 1: 0/10  Pain Rating Post-Intervention 1: 0/10    Patients cultural, spiritual, Gnosticism conflicts given the current situation:      Objective:     Patient found with: bed alarm    General Precautions: Standard, fall   Orthopedic Precautions:Full weight bearing   Braces: N/A     Occupational Performance:    Bed Mobility:    · Patient completed Scooting/Bridging with maximal assistance  · Patient completed Supine to Sit with maximal assistance  · Patient  completed Sit to Supine with maximal assistance    Functional Mobility/Transfers:  · Patient completed Sit <> Stand Transfer with maximal assistance  with  rolling walker       Activities of Daily Living:  · Feeding:  NPO this date  · LB Dressing: total assistance with no AE  · Toileting: total assistance with accidents    Cognitive/Visual Perceptual:  Cognitive/Psychosocial Skills:     -       Oriented to: Person   -       Follows Commands/attention:Inattentive and Easily distracted  -       Communication: clear/fluent  -       Memory: Impaired STM, Impaired LTM and Poor immediate recall  -       Safety awareness/insight to disability: impaired   -       Mood/Affect/Coping skills/emotional control: Cooperative    Physical Exam:  Balance:    -       Seated Fair- Standing -Poor  Postural examination/scapula alignment:    -       Rounded shoulders  -       Forward head  Skin integrity: Thin  Motor Planning:    -       Mild difficulty noted  Dominant hand:    -       Right  Upper Extremity Range of Motion:     -       Right Upper Extremity: Deficits: in Shoulder  -       Left Upper Extremity: in Shoulder  Upper Extremity Strength:    -       Right Upper Extremity: Deficits: 3/5  -       Left Upper Extremity: 3/5   Strength:    -       Right Upper Extremity: Fair  -       Left Upper Extremity: Deficits: Fair  Fine Motor Coordination:    -       Impaired  mildly  Gross motor coordination:   mild impairment noted  Neurological:    -       mild tremors    Patient left HOB elevated with all lines intact, call button in reach, bed alarm on and nurse and spouse present    AMPA 6 Click:  AMPA Total Score: 14    Treatment & Education:  Role of OT and POC  Education:    Assessment:     Pearl Woodard is a 77 y.o. female with a medical diagnosis of Intestinal obstruction.  She presents with minimal change in function from PLOF.  Performance deficits affecting function are weakness, impaired functional mobilty,  "impaired self care skills, impaired balance, gait instability, impaired coordination, decreased safety awareness, impaired cognition, impaired skin.      Rehab Prognosis:  Good; patient would benefit from acute skilled OT services to address these deficits and reach maximum level of function.   Patient discharge home before skilled tx could occur.      Clinical Decision Makin.  OT Low:  "Pt evaluation falls under low complexity for evaluation coding due to performance deficits noted in 1-3 areas as stated above and 0 co-morbities affecting current functional status. Data obtained from problem focused assessments. No modifications or assistance was required for completion of evaluation. Only brief occupational profile and history review completed."     Plan:     Patient to be seen  (NA) to address the above listed problems via  (Home with HH likely today)  · Plan of Care Expires: 18  · Plan of Care Reviewed with: patient    This Plan of care has been discussed with the patient who was involved in its development and understands and is in agreement with the identified goals and treatment plan    GOALS:    Occupational Therapy Goals     Not on file          Multidisciplinary Problems (Resolved)        Problem: Occupational Therapy Goal    Goal Priority Disciplines Outcome Interventions   Occupational Therapy Goal   (Resolved)     OT, PT/OT Outcome(s) achieved                    Time Tracking:     OT Date of Treatment: 18  OT Start Time: 1025  OT Stop Time: 1056  OT Total Time (min): 31 min    Billable Minutes:Evaluation 16  Therapeutic Activity 15    Galilea Sevilla OT  2018    "

## 2018-04-28 NOTE — DISCHARGE SUMMARY
[facility] [place]       General Surgery Discharge Summary    04/28/2018  1:31 PM    Patient: Pearl Woodard  MRN: [id number]  Admit Date: 4/25/2018  LOS: 2 days  Discharge Date: 04/28/2018    Procedures: NGT Decompression.    HPI: Case of 77 y.o. female admitted with a repeat bout of adhesive SBO.     Hospital Course:     04/25 - Patient admitted from the ED, placed on NPO, and NGT inserted and connected to LIWS. No acute events reported overnight.     04/26 - Pt evaluated and detailed history was obtained. She was noted to have minimal output from the NGT and a relatively non-distended abdomen. She was monitored closely throughout the day and after confirming absence of acute increase in NGT output, SBFT ordered.    04/27 - SBFT was performed and no obstruction seen. Contrast confirmed to make it all the way to the colon. Patient was shortly after reported as having a large bowel movement. She was re-evaluated after the SBFT and noted to have good bowel sounds, scant NGT output and no ongoing signs of obstruction. NGT was removed and patient was started on a trial of sips and chips.    04/28 - Patient tolerated sips/chips trial overnight without issues. She continued to pass gas and have appropriate bowel movements. Diet was advanced to regular and tolerated. Patient's SBO deemed resolved and patient considered fit for discharge home.       Patient Active Problem List    Diagnosis Date Noted    Intestinal obstruction 04/26/2018    Slow transit constipation 06/13/2017    Acute blood loss anemia 06/09/2017    Malnutrition of moderate degree 06/09/2017    Depression 06/08/2017    Dementia without behavioral disturbance 06/08/2017    Closed left hip fracture 06/07/2017    History of syncope 04/16/2015    Hypothyroidism 04/16/2015    Right bundle branch block 02/06/2015    Carotid artery stenosis 10/07/2013    Hypertension 03/14/2013    Rheumatoid arthritis 03/14/2013     Hypercholesteremia 03/14/2013       Scheduled Meds:   famotidine (PF)  20 mg Intravenous BID    levothyroxine  12.5 mcg Intravenous Daily     Continuous Infusions:   dextrose 5 % and 0.9 % NaCl with KCl 20 mEq 125 mL/hr at 04/28/18 0525     PRN Meds:.acetaminophen, diphenhydrAMINE, hydrALAZINE, labetalol, morphine, morphine, ondansetron, promethazine (PHENERGAN) IVPB, ramelteon, sodium chloride 0.9%     LanDerek whiteia Jovanni   Home Medication Instructions AYDE:[id number]    Printed on:04/28/18 7395   Medication Information                      ACETAMINOPHEN (TYLENOL ARTHRITIS ORAL)  Take by mouth 2 (two) times daily as needed.             amLODIPine (NORVASC) 5 MG tablet  Take 1 tablet (5 mg total) by mouth once daily.             aspirin (ECOTRIN) 81 MG EC tablet  Take 1 tablet (81 mg total) by mouth once daily.             CETIRIZINE HCL (ZYRTEC ORAL)  Take 10 mg by mouth once daily.              chlorhexidine (PERIDEX) 0.12 % solution               cholecalciferol, vitamin D3, (VITAMIN D3) 2,000 unit Cap  Take 1 capsule by mouth once daily.             CRANBERRY FRUIT EXTRACT (CRANBERRY EXTRACT ORAL)  Take 2 tablets by mouth once daily.             folic acid (FOLVITE) 1 MG tablet  once daily.              hydroxychloroquine (PLAQUENIL) 200 mg tablet  Take by mouth once daily. Per [facility]             lactulose (CHRONULAC) 20 gram/30 mL Soln  Take 30 mLs (20 g total) by mouth daily as needed.             levothyroxine (SYNTHROID) 25 MCG tablet  TAKE 1 TABLET BY MOUTH EVERY DAY             levothyroxine (SYNTHROID) 50 MCG tablet  Take 25 mcg by mouth before breakfast.              lorazepam (ATIVAN) 1 MG tablet  Take 1 mg by mouth daily as needed for Anxiety.             memantine (NAMENDA) 10 MG Tab  Take 28 mg by mouth once daily. Per [facility]             methotrexate 2.5 MG Tab  Take 2.5 mg by mouth every 7 days. Takes 8 tabs, every Wednesday             mirabegron (MYRBETRIQ)  50 mg Tb24  Take by mouth every evening.             montelukast (SINGULAIR) 10 mg tablet  Take 10 mg by mouth every evening.             POLYETHYLENE GLYCOL 3350 (MIRALAX ORAL)  Take by mouth as needed.             ranitidine (ZANTAC) 150 MG capsule  Take 150 mg by mouth once daily. Per [facility]             senna (SENOKOT) 8.6 mg tablet  Take 1 tablet by mouth 2 (two) times daily. Per [facility]             sertraline (ZOLOFT) 100 MG tablet  25 mg once daily.              tramadol (ULTRAM) 50 mg tablet  Take 50 mg by mouth 2 (two) times daily as needed for Pain.             UNABLE TO FIND  1 tablet once daily. ANNETTE RED                 Discharge Instructions: Return to the ED for evaluation should similar symptoms recur.     Anjana Romero MD  [facility] Surgery PGY-IV

## 2018-04-28 NOTE — PLAN OF CARE
"Problem: Patient Care Overview  Goal: Plan of Care Review  Outcome: Ongoing (interventions implemented as appropriate)  Monitored freq.throughot the shift. Pt.resting at present with no complaints and no acute distress noted. sbft test done earlier at bedside & pt.ad several loose BM's after. NG tube d/c"d as ordered. Pt.tolerating sips of water & ice chips sparingly. Iv fluids continue in use. Safety sitter d/c earlier.pt.oriented to self only..Remains incontinent of urine & stool.Fly member at bedside.      "

## 2018-04-28 NOTE — PLAN OF CARE
Problem: Patient Care Overview  Goal: Plan of Care Review  Outcome: Ongoing (interventions implemented as appropriate)  Unable to rest this shift despite PRN sleeping pill. Several BM's this shift. Tolerating sips of water and ice chips. Bed alarm armed and audible. Remains free from falls or truama.

## 2018-04-28 NOTE — PLAN OF CARE
Problem: Occupational Therapy Goal  Goal: Occupational Therapy Goal  Outcome: Outcome(s) achieved Date Met: 04/28/18  OT evaluation completed.  Patient close to PLOF requiring max to total A with LB ADLs and min to Mod A with UB ADLs.  Sitters, DME, and 24/7 care in place.  Patient should resume HH with OT added at discharge.

## 2018-04-28 NOTE — PROGRESS NOTES
[facility] [place]      General Surgery Progress Note    04/28/2018  1:26 PM      Patient: Pearl Woodard  MRN: [id number]  Admit Date: 4/25/2018  LOS: 2    SBO.     Subjective                                                                                                        Pt's  alleges she is feeling much better since her NGT was discontinued. She alleges continued passing of flatus and BM's. She denies any nausea, vomiting or untoward effects with PO intake.     Events Overnight: None.    Patient Active Problem List   Diagnosis    Hypertension    Rheumatoid arthritis    Hypercholesteremia    Carotid artery stenosis    Right bundle branch block    History of syncope    Hypothyroidism    Closed left hip fracture    Depression    Dementia without behavioral disturbance    Acute blood loss anemia    Malnutrition of moderate degree    Slow transit constipation    Intestinal obstruction       No current facility-administered medications on file prior to encounter.      Current Outpatient Prescriptions on File Prior to Encounter   Medication Sig Dispense Refill    ACETAMINOPHEN (TYLENOL ARTHRITIS ORAL) Take by mouth 2 (two) times daily as needed.      amLODIPine (NORVASC) 5 MG tablet Take 1 tablet (5 mg total) by mouth once daily. 90 tablet 3    aspirin (ECOTRIN) 81 MG EC tablet Take 1 tablet (81 mg total) by mouth once daily. 90 tablet 3    CETIRIZINE HCL (ZYRTEC ORAL) Take 10 mg by mouth once daily.       cholecalciferol, vitamin D3, (VITAMIN D3) 2,000 unit Cap Take 1 capsule by mouth once daily.      CRANBERRY FRUIT EXTRACT (CRANBERRY EXTRACT ORAL) Take 2 tablets by mouth once daily.      folic acid (FOLVITE) 1 MG tablet once daily.       hydroxychloroquine (PLAQUENIL) 200 mg tablet Take by mouth once daily. Per [facility]      levothyroxine (SYNTHROID) 25 MCG tablet TAKE 1 TABLET BY MOUTH EVERY DAY 30 tablet 11    levothyroxine (SYNTHROID) 50 MCG  tablet Take 25 mcg by mouth before breakfast.   5    lorazepam (ATIVAN) 1 MG tablet Take 1 mg by mouth daily as needed for Anxiety.      memantine (NAMENDA) 10 MG Tab Take 28 mg by mouth once daily. Per [facility]      methotrexate 2.5 MG Tab Take 2.5 mg by mouth every 7 days. Takes 8 tabs, every Wednesday  0    mirabegron (MYRBETRIQ) 50 mg Tb24 Take by mouth every evening.      montelukast (SINGULAIR) 10 mg tablet Take 10 mg by mouth every evening.      POLYETHYLENE GLYCOL 3350 (MIRALAX ORAL) Take by mouth as needed.      senna (SENOKOT) 8.6 mg tablet Take 1 tablet by mouth 2 (two) times daily. Per [facility]      sertraline (ZOLOFT) 100 MG tablet 25 mg once daily.       tramadol (ULTRAM) 50 mg tablet Take 50 mg by mouth 2 (two) times daily as needed for Pain.      UNABLE TO FIND 1 tablet once daily. ANNETTE RED      chlorhexidine (PERIDEX) 0.12 % solution   1    lactulose (CHRONULAC) 20 gram/30 mL Soln Take 30 mLs (20 g total) by mouth daily as needed. 480 mL 0    ranitidine (ZANTAC) 150 MG capsule Take 150 mg by mouth once daily. Per [facility]         Objective                                                                                                          Vitals:    04/27/18 1932 04/28/18 0001 04/28/18 0748 04/28/18 1142   BP: (!) 154/68 136/71 (!) 142/63 (!) 149/65   BP Location:   Right arm Right arm   Patient Position: Lying Lying Lying Lying   Pulse: 71 70 72 68   Resp: 18 18 18 18   Temp: 98.8 °F (37.1 °C) 97.1 °F (36.2 °C) 97.6 °F (36.4 °C) 98.9 °F (37.2 °C)   TempSrc: Oral Oral Oral Oral   SpO2: 98% 98% 99% 98%   Weight:       Height:           CBC   Recent Labs  Lab 04/25/18 2143 04/27/18 2118   WBC 9.52 8.82   HGB 14.9 12.2   HCT 44.4 36.9*    196       CHEM   Recent Labs  Lab 04/25/18  2143 04/27/18  2118    139   K 4.1 3.6    112*   CO2 28 23   BUN 14 7*   CREATININE 0.8 0.5   * 115*       PHYSICAL EXAM:    GEN: Alert  and awake. NAD. No apparent pain/discomfort at rest.   HEENT: NC. AT. EOMI. MOM.   RESP: No SOB, dyspnea or tachypnea.   CV: RRR  ABD: Flat and non-distended, soft, depressible and non-tender to palpation.     Assessment / Plan:                                                                                           A/P: Case of 77 y.o. F admitted for SBO managed conservatively with NGT decompression and NPO. No acute events overnight. Pt afebrile and hemodynamically stable. She has continued to pass gas, have well-formed bowel movements and tolerate CLD. Abdominal exam is entirely unremarkable and within normal limits. Her diet just advanced to regular for lunch and if tolerated without issues, will discharge later today.     Anjana Romero MD  [facility] Surgery PGY-IV

## 2018-04-30 ENCOUNTER — PATIENT OUTREACH (OUTPATIENT)
Dept: ADMINISTRATIVE | Facility: CLINIC | Age: 78
End: 2018-04-30

## 2018-04-30 RX ORDER — MEMANTINE HYDROCHLORIDE 5 MG-10 MG
28 KIT ORAL SEE ADMIN INSTRUCTIONS
COMMUNITY

## 2018-04-30 NOTE — PATIENT INSTRUCTIONS
Small Bowel Obstruction  A small bowel obstruction occurs when there is partial or total blockage in the small intestine (bowel). As a result, waste cannot move through the bowel properly and out of the body. Treatment is needed right away to remove the blockage. This can relieve painful symptoms. It can also prevent serious complications such as tissue death or rupture of the small bowel. Without treatment, a small bowel obstruction can be fatal.  Causes of Small Bowel Obstruction  A small bowel obstruction can be caused by:  Scar tissue (adhesions). These may form after surgery or an infection.  Hernia (weakness or tear in the wall of the abdomen). This can cause a portion of the small bowel to push out and appear as a visible bulge under the skin.  Certain medical conditions. These include intussusception (occurs when a portion of the bowel slides inside another portion) and Crohns disease (illness that causes inflammation and sores to form in the digestive tract).  Abnormal tissue growths (tumors). These can form on the inside or outside of the small bowel, and are usually due to cancer.  Symptoms of Small Bowel Obstruction  Common symptoms include:  Abdominal cramping and pain  Abdominal swelling and bloating  Nausea and vomiting  Inability to pass gas  Inability to pass stool (constipation)  Diarrhea  Diagnosing Small Bowel Obstruction  Your doctor will ask about your symptoms and health history. Youll also have a physical exam. In addition, tests may be done to confirm the problem. These can include:  Imaging tests. These provide pictures of the small bowel. Common tests include x-rays and a computed tomography (CT) scan.  Blood tests. These check for infection and other problems such as dehydration.  Upper gastrointestinal (GI) series with a small bowel follow-through. This test is done to take x-rays of the upper digestive tract from the mouth through the small bowel. A contrast fluid is used. The  contrast fluid coats the inside of the upper digestive tract so it will show up clearly on x-rays.  Treating Small Bowel Obstruction  Treatment takes place in a hospital. As part of your care, the following may be done:  No food or drink is given by mouth. This allows your bowels to rest.  An intravenous (IV) line is placed in a vein in your arm or hand. The IV line is used to give fluids. It may also be used to give medications. These may be needed to relieve pain, nausea, and other symptoms. They may also be needed to treat or prevent infections.  A soft, thin, flexible tube (nasogastric tube) is inserted through your nose and into your stomach. The tube is used to remove extra gas and fluid in your stomach and bowels. This helps to relieve symptoms such as pain and swelling.  In severe cases, such as when the small bowel is almost or totally blocked, surgery is done. During surgery, the blockage is removed. Portions of the bowel may also be removed if there is tissue death. Other repair may be done as well, depending on the cause of the blockage. Your doctor will give you more information about surgery, if needed.  Youll be observed in the hospital until your symptoms improve. Your doctor will tell you when you can return home.  Long-term Concerns   After treatment, many people recover with no lasting effects. If a long portion of the bowel is removed, there is a greater chance for lifelong digestive problems such as irregular bowel movements. Work with your doctor to learn the best ways to manage any symptoms you may have and to protect your health.    When to Call the Doctor  Call your doctor right away if you have any of the following:  Abdominal swelling or cramping that wont go away  Inability to pass stool or gas  Nausea or vomiting (especially if the vomit looks or smells like stool)   © 2808-3454 [employer], [address] [place] [address], [place] [address]. All rights reserved. This information is not  intended as a substitute for professional medical care. Always follow your healthcare professional's instructions.

## 2018-05-01 NOTE — PHYSICIAN QUERY
PT Name: Pearl Woodard  MR #: [id number]     Physician Query Form - Documentation Clarification      CDS/: Brandy E Capley               Contact information: Spectralink:  [phone]    This form is a permanent document in the medical record.     Query Date: May 1, 2018    By submitting this query, we are merely seeking further clarification of documentation. Please utilize your independent clinical judgment when addressing the question(s) below.    The Medical record reflects the following:    Supporting Clinical Findings Location in Medical Record     HPI: Case of 77 y.o. female admitted with a repeat bout of adhesive SBO.      Discharge Summaries 4/28   Impression   1. Dilatation of proximal to mid small bowel loops consistent with small-bowel obstruction with no definite mechanical cause for obstruction seen.  Distal small bowel loops are decompressed, however transition point is difficult to identify.  2. Additional findings as detailed above.    There is abnormal dilatation of small bowel loops consistent with small-bowel obstruction as seen on earlier CT exam.    Impression   1. Mild to moderately dilatation of the proximal and mid small bowel without definite signs for mechanical obstruction.  Findings suspicious for either ileus in the proximal and mid small bowel or a partial small bowel obstruction.     CT abdomen pelvis 4/26            Xray Abd 4/26        Fl small bowel follow through 4/27                                                                            Doctor, Please specify diagnosis or diagnoses associated with above clinical findings.  Please further specify intestinal obstruction.    Provider Use Only       (  )  Partial intestinal obstruction      (  )  Complete intestinal obstruction     (  )  Unspecified intestinal obstruction      (  )  Other (please specify):  ______________________________                                                                                                              [x  ] Clinically undetermined

## 2018-10-03 ENCOUNTER — CLINICAL SUPPORT (OUTPATIENT)
Dept: CARDIOLOGY | Facility: CLINIC | Age: 78
End: 2018-10-03
Payer: COMMERCIAL

## 2018-10-03 DIAGNOSIS — I65.29 CAROTID ARTERY STENOSIS: ICD-10-CM

## 2018-10-03 PROCEDURE — 93880 EXTRACRANIAL BILAT STUDY: CPT | Mod: S$GLB,,, | Performed by: INTERNAL MEDICINE

## 2018-10-11 DIAGNOSIS — I65.29 CAROTID ARTERY STENOSIS: ICD-10-CM

## 2019-12-18 ENCOUNTER — OFFICE VISIT (OUTPATIENT)
Dept: CARDIOLOGY | Facility: CLINIC | Age: 79
End: 2019-12-18
Attending: INTERNAL MEDICINE
Payer: MEDICARE

## 2019-12-18 VITALS
DIASTOLIC BLOOD PRESSURE: 63 MMHG | WEIGHT: 106 LBS | BODY MASS INDEX: 17.66 KG/M2 | SYSTOLIC BLOOD PRESSURE: 123 MMHG | HEIGHT: 65 IN | HEART RATE: 87 BPM

## 2019-12-18 DIAGNOSIS — I45.10 RIGHT BUNDLE BRANCH BLOCK: ICD-10-CM

## 2019-12-18 DIAGNOSIS — E78.00 HYPERCHOLESTEREMIA: ICD-10-CM

## 2019-12-18 DIAGNOSIS — Z87.898 HISTORY OF SYNCOPE: ICD-10-CM

## 2019-12-18 DIAGNOSIS — M05.761 RHEUMATOID ARTHRITIS INVOLVING BOTH KNEES WITH POSITIVE RHEUMATOID FACTOR: ICD-10-CM

## 2019-12-18 DIAGNOSIS — M05.762 RHEUMATOID ARTHRITIS INVOLVING BOTH KNEES WITH POSITIVE RHEUMATOID FACTOR: ICD-10-CM

## 2019-12-18 DIAGNOSIS — I10 ESSENTIAL HYPERTENSION: ICD-10-CM

## 2019-12-18 DIAGNOSIS — I65.23 BILATERAL CAROTID ARTERY STENOSIS: ICD-10-CM

## 2019-12-18 DIAGNOSIS — G30.1 LATE ONSET ALZHEIMER'S DISEASE WITHOUT BEHAVIORAL DISTURBANCE: ICD-10-CM

## 2019-12-18 DIAGNOSIS — F02.80 LATE ONSET ALZHEIMER'S DISEASE WITHOUT BEHAVIORAL DISTURBANCE: ICD-10-CM

## 2019-12-18 DIAGNOSIS — E03.4 HYPOTHYROIDISM DUE TO ACQUIRED ATROPHY OF THYROID: ICD-10-CM

## 2019-12-18 PROCEDURE — 1159F PR MEDICATION LIST DOCUMENTED IN MEDICAL RECORD: ICD-10-PCS | Mod: S$GLB,,, | Performed by: INTERNAL MEDICINE

## 2019-12-18 PROCEDURE — 93000 ELECTROCARDIOGRAM COMPLETE: CPT | Mod: S$GLB,,, | Performed by: INTERNAL MEDICINE

## 2019-12-18 PROCEDURE — 93000 PR ELECTROCARDIOGRAM, COMPLETE: ICD-10-PCS | Mod: S$GLB,,, | Performed by: INTERNAL MEDICINE

## 2019-12-18 PROCEDURE — 1159F MED LIST DOCD IN RCRD: CPT | Mod: S$GLB,,, | Performed by: INTERNAL MEDICINE

## 2019-12-18 PROCEDURE — 99214 OFFICE O/P EST MOD 30 MIN: CPT | Mod: 25,S$GLB,, | Performed by: INTERNAL MEDICINE

## 2019-12-18 PROCEDURE — 99214 PR OFFICE/OUTPT VISIT, EST, LEVL IV, 30-39 MIN: ICD-10-PCS | Mod: 25,S$GLB,, | Performed by: INTERNAL MEDICINE

## 2019-12-18 NOTE — PROGRESS NOTES
Subjective:     Pearl Woodard is a 79 y.o. female with hypertension and hypercholesterolemia. She had a Calcium Score of zero. She had a Carotid Duplex on 10/3/2013 that revealed mild plaquing with increased velocity in the right external carotid artery. She had some heaviness in chest 1/12/2014 related to eating and taking medications. She has a dropped left foot. Worried about loosing teeth. On 4/13/2015 had syncope after voiding. Felt dizzy and fell. Had a fall due to her dropfoot 9/17/2015 but no loss of consciousness. Worried about her memory but that has settled with Namenda. She is currently undergoing further therapy for her rheumatoid arthritis. She was treated for UTI but stopped the antibiotics due to upset stomach. She had a fall 6/7/2017 and suffered a broken hip. She had difficulty walking. In 2019 she moved in to the nursing home portion of [facility]. No chest pain or dyspnea. No feeling of heart beating or palpitation. Feeling fair overall.      Loss of Consciousness   The problem has been resolved. Associated symptoms include back pain, dizziness and weakness. Pertinent negatives include no abdominal pain, auditory change, aura, chest pain, clumsiness, confusion, diaphoresis, fever, focal sensory loss, focal weakness, headaches, light-headedness, malaise/fatigue, nausea, palpitations, slurred speech, vertigo, visual change or vomiting.   Hypertension   This is a chronic problem. The current episode started more than 1 year ago. The problem is unchanged. The problem is controlled (usually 120-140/60-70 mmHg at home). Pertinent negatives include no anxiety, blurred vision, chest pain, headaches, malaise/fatigue, neck pain, orthopnea, palpitations, peripheral edema, PND, shortness of breath or sweats. There is no history of chronic renal disease.   Hyperlipidemia   This is a chronic problem. The current episode started more than 1 year ago. The problem is controlled. Recent lipid tests  were reviewed and are low. She has no history of chronic renal disease, diabetes, hypothyroidism, liver disease, obesity or nephrotic syndrome. Pertinent negatives include no chest pain, focal sensory loss, focal weakness, leg pain, myalgias or shortness of breath.       Review of Systems   Constitution: Negative for diaphoresis, fever and malaise/fatigue.   HENT: Negative for nosebleeds.    Eyes: Positive for visual disturbance. Negative for blurred vision and double vision.   Cardiovascular: Negative for chest pain, claudication, dyspnea on exertion, irregular heartbeat, leg swelling, near-syncope, orthopnea, palpitations, paroxysmal nocturnal dyspnea and syncope.   Respiratory: Negative for cough, hemoptysis, shortness of breath, sputum production and wheezing.    Endocrine: Negative for cold intolerance and heat intolerance.   Hematologic/Lymphatic: Negative for bleeding problem. Does not bruise/bleed easily.   Skin: Negative for color change and rash.   Musculoskeletal: Positive for arthritis and back pain. Negative for falls, gout, myalgias, neck pain and stiffness.   Gastrointestinal: Negative for abdominal pain, heartburn, hematemesis, hematochezia, hemorrhoids, jaundice, melena, nausea and vomiting.   Genitourinary: Positive for frequency and nocturia. Negative for dysuria, hematuria and menorrhagia.   Neurological: Positive for disturbances in coordination, dizziness and weakness. Negative for excessive daytime sleepiness, focal weakness, headaches, light-headedness, loss of balance and vertigo.   Psychiatric/Behavioral: Positive for memory loss. Negative for altered mental status, confusion and depression. The patient is not nervous/anxious.    Allergic/Immunologic: Negative for hives and persistent infections.       Current Outpatient Medications on File Prior to Visit   Medication Sig Dispense Refill    ACETAMINOPHEN (TYLENOL ARTHRITIS ORAL) Take by mouth 2 (two) times daily as needed.      amLODIPine  "(NORVASC) 5 MG tablet Take 1 tablet (5 mg total) by mouth once daily. 90 tablet 3    CETIRIZINE HCL (ZYRTEC ORAL) Take 10 mg by mouth once daily.       chlorhexidine (PERIDEX) 0.12 % solution   1    cholecalciferol, vitamin D3, (VITAMIN D3) 2,000 unit Cap Take 1 capsule by mouth once daily.      CRANBERRY FRUIT EXTRACT (CRANBERRY EXTRACT ORAL) Take 2 tablets by mouth once daily.      folic acid (FOLVITE) 1 MG tablet once daily.       lactulose (CHRONULAC) 20 gram/30 mL Soln Take 30 mLs (20 g total) by mouth daily as needed. 480 mL 0    levothyroxine (SYNTHROID) 25 MCG tablet TAKE 1 TABLET BY MOUTH EVERY DAY 30 tablet 11    levothyroxine (SYNTHROID) 50 MCG tablet Take 25 mcg by mouth before breakfast.   5    lorazepam (ATIVAN) 1 MG tablet Take 1 mg by mouth daily as needed for Anxiety.      memantine (NAMENDA TITRATION PACK) tablet pack Take 28 mg by mouth As instructed. Follow package directions.      methotrexate 2.5 MG Tab Take 2.5 mg by mouth every 7 days. Takes 8 tabs, every Wednesday  0    mirabegron (MYRBETRIQ) 50 mg Tb24 Take by mouth every evening.      montelukast (SINGULAIR) 10 mg tablet Take 10 mg by mouth every evening.      POLYETHYLENE GLYCOL 3350 (MIRALAX ORAL) Take by mouth as needed.      ranitidine (ZANTAC) 150 MG capsule Take 150 mg by mouth once daily. Per [facility]      senna (SENOKOT) 8.6 mg tablet Take 1 tablet by mouth 2 (two) times daily. Per [facility]      sertraline (ZOLOFT) 100 MG tablet 25 mg once daily.       tramadol (ULTRAM) 50 mg tablet Take 50 mg by mouth 2 (two) times daily as needed for Pain.      UNABLE TO FIND 1 tablet once daily. ANNETTE RED      aspirin (ECOTRIN) 81 MG EC tablet Take 1 tablet (81 mg total) by mouth once daily. 90 tablet 3     No current facility-administered medications on file prior to visit.        /63   Pulse 87   Ht 5' 5" (1.651 m)   Wt 48.1 kg (106 lb)   LMP  (LMP Unknown)   BMI 17.64 kg/m² "       Objective:     Physical Exam   Constitutional: She is oriented to person, place, and time. She appears well-developed and well-nourished. She does not appear ill. No distress.   HENT:   Head: Normocephalic and atraumatic.   Nose: Nose normal.   Eyes: Right eye exhibits no discharge. Left eye exhibits no discharge. Right conjunctiva is not injected. Left conjunctiva is not injected. No scleral icterus. Right pupil is round. Left pupil is round. Pupils are equal.   Neck: Neck supple. No JVD present. Carotid bruit is not present. No thyromegaly present.   Cardiovascular: Normal rate, regular rhythm, S1 normal and S2 normal.  No extrasystoles are present. Exam reveals no gallop, no S3 and no S4.   No murmur heard.  Pulses:       Radial pulses are 2+ on the right side, and 2+ on the left side.        Dorsalis pedis pulses are 2+ on the right side, and 2+ on the left side.        Posterior tibial pulses are 2+ on the right side, and 2+ on the left side.   Pulmonary/Chest: Effort normal and breath sounds normal.   Abdominal: Soft. Normal appearance. There is no hepatosplenomegaly. There is no tenderness.   Musculoskeletal:        Right ankle: She exhibits no swelling, no ecchymosis and no deformity.        Left ankle: She exhibits no swelling, no ecchymosis and no deformity.   Lymphadenopathy:        Head (right side): No submandibular adenopathy present.        Head (left side): No submandibular adenopathy present.     She has no cervical adenopathy.   Neurological: She is alert and oriented to person, place, and time. She is not disoriented. No cranial nerve deficit or sensory deficit.   Skin: Skin is warm, dry and intact. No rash noted. She is not diaphoretic. Nails show no clubbing.   Psychiatric: She has a normal mood and affect. Cognition and memory are impaired.       Assessment:     1. Bilateral carotid artery stenosis    2. History of syncope    3. Right bundle branch block    4. Essential hypertension    5.  Hypercholesteremia    6. Hypothyroidism due to acquired atrophy of thyroid    7. Rheumatoid arthritis involving both knees with positive rheumatoid factor    8. Late onset Alzheimer's disease without behavioral disturbance        Plan:     1. Carotid Artery Disease   10/3/2013: Carotid Duplex: EVERT: Mild. RECA: 2.0 m/s. LICA: Mild.   10/28/2015: Carotid Duplex: EVERT: Moderate plaquing. LICA: Moderate plaquing.   10/24/2017: Carotid Duplex: EVERT: Moderate plaquing - <50%. LICA: Severe plaquing - 50-60%.   10/3/2018: Carotid Duplex: EVERT: Moderate plaquing - <50%. LICA: Moderate plaquing - <50%.   On aspirin 81 mg Q24.     Stable.     2. History of Syncope   4/13/2015: Syncope.   Related to bathroom visit.   No recurrence.    3. Right Bundle Branch Block   2/6/2015: Diagnosed with right bundle branch block.     4. Hypertension   2008: Diagnosed.   On amlodipine 5 mg Q24.   Appears well controlled.    5. Hypercholesterolemia   8/5/2013: Chol 277. HDL 75. . .   Not treated in setting of Calcium Score of 0, high HDL with intolerance to statin.   9/24/2015: Chol 221. HDL 60. . TG 81.    Reassurance.    6. Hypothyroidism   2013: Diagnosed.   On levothyroxine 25 mcg Q24.    7. Rheumatic Arthritis   2005: Diagnosed.   On methotrexate.   Dr. Omar Howe.    8. Mental Decline   2017: Diagnosed with dementia.   On Namenda.    9. Social Issues   2019: Moved in to NH of [facility].    10. Primary Care   Dr. Rach Henriquez.     F/u 6 months.    Tea Weeks M.D.

## 2020-06-17 ENCOUNTER — OFFICE VISIT (OUTPATIENT)
Dept: CARDIOLOGY | Facility: CLINIC | Age: 80
End: 2020-06-17
Attending: INTERNAL MEDICINE
Payer: MEDICARE

## 2020-06-17 VITALS — HEART RATE: 69 BPM | DIASTOLIC BLOOD PRESSURE: 75 MMHG | SYSTOLIC BLOOD PRESSURE: 125 MMHG

## 2020-06-17 DIAGNOSIS — Z87.898 HISTORY OF SYNCOPE: ICD-10-CM

## 2020-06-17 DIAGNOSIS — I65.23 BILATERAL CAROTID ARTERY STENOSIS: ICD-10-CM

## 2020-06-17 DIAGNOSIS — I10 ESSENTIAL HYPERTENSION: ICD-10-CM

## 2020-06-17 DIAGNOSIS — M05.761 RHEUMATOID ARTHRITIS INVOLVING BOTH KNEES WITH POSITIVE RHEUMATOID FACTOR: ICD-10-CM

## 2020-06-17 DIAGNOSIS — F02.80 LATE ONSET ALZHEIMER'S DISEASE WITHOUT BEHAVIORAL DISTURBANCE: ICD-10-CM

## 2020-06-17 DIAGNOSIS — I45.10 RIGHT BUNDLE BRANCH BLOCK: ICD-10-CM

## 2020-06-17 DIAGNOSIS — E78.00 HYPERCHOLESTEREMIA: ICD-10-CM

## 2020-06-17 DIAGNOSIS — E03.4 HYPOTHYROIDISM DUE TO ACQUIRED ATROPHY OF THYROID: ICD-10-CM

## 2020-06-17 DIAGNOSIS — G30.1 LATE ONSET ALZHEIMER'S DISEASE WITHOUT BEHAVIORAL DISTURBANCE: ICD-10-CM

## 2020-06-17 DIAGNOSIS — M05.762 RHEUMATOID ARTHRITIS INVOLVING BOTH KNEES WITH POSITIVE RHEUMATOID FACTOR: ICD-10-CM

## 2020-06-17 PROCEDURE — 99999 PR PBB SHADOW E&M-EST. PATIENT-LVL IV: CPT | Mod: PBBFAC,,, | Performed by: INTERNAL MEDICINE

## 2020-06-17 PROCEDURE — 99214 OFFICE O/P EST MOD 30 MIN: CPT | Mod: S$PBB,,, | Performed by: INTERNAL MEDICINE

## 2020-06-17 PROCEDURE — 99214 PR OFFICE/OUTPT VISIT, EST, LEVL IV, 30-39 MIN: ICD-10-PCS | Mod: S$PBB,,, | Performed by: INTERNAL MEDICINE

## 2020-06-17 PROCEDURE — 99999 PR PBB SHADOW E&M-EST. PATIENT-LVL IV: ICD-10-PCS | Mod: PBBFAC,,, | Performed by: INTERNAL MEDICINE

## 2020-06-17 PROCEDURE — 99214 OFFICE O/P EST MOD 30 MIN: CPT | Mod: PBBFAC | Performed by: INTERNAL MEDICINE

## 2020-06-17 RX ORDER — ASPIRIN 81 MG/1
81 TABLET ORAL DAILY
Qty: 90 TABLET | Refills: 3 | Status: SHIPPED | OUTPATIENT
Start: 2020-06-17

## 2020-06-17 RX ORDER — AMLODIPINE BESYLATE 5 MG/1
5 TABLET ORAL DAILY
Qty: 90 TABLET | Refills: 3 | Status: SHIPPED | OUTPATIENT
Start: 2020-06-17

## 2020-06-17 NOTE — PROGRESS NOTES
Subjective:     Pearl Woodard is a 79 y.o. female with hypertension and hypercholesterolemia. She had a Calcium Score of zero. She had a Carotid Duplex on 10/3/2013 that revealed mild plaquing with increased velocity in the right external carotid artery. She had some heaviness in chest 1/12/2014 related to eating and taking medications. She has a dropped left foot. Worried about loosing teeth. On 4/13/2015 had syncope after voiding. Felt dizzy and fell. Had a fall due to her dropfoot 9/17/2015 but no loss of consciousness. Worried about her memory but that has settled with Namenda. She is currently undergoing further therapy for her rheumatoid arthritis. She was treated for UTI but stopped the antibiotics due to upset stomach. She had a fall 6/7/2017 and suffered a broken hip. She had difficulty walking. In 2019 she moved in to the nursing home portion of [facility]. No chest pain or dyspnea. No feeling of heart beating or palpitation. Feeling fair overall. Appears comfortable.      Hypertension  This is a chronic problem. The current episode started more than 1 year ago. The problem is unchanged. The problem is controlled (usually 120-130/60-70 mmHg at home). Pertinent negatives include no anxiety, blurred vision, chest pain, headaches, malaise/fatigue, neck pain, orthopnea, palpitations, peripheral edema, PND, shortness of breath or sweats. There is no history of chronic renal disease.   Hyperlipidemia  This is a chronic problem. The current episode started more than 1 year ago. The problem is controlled. Recent lipid tests were reviewed and are low. She has no history of chronic renal disease, diabetes, hypothyroidism, liver disease, obesity or nephrotic syndrome. Pertinent negatives include no chest pain, focal sensory loss, focal weakness, leg pain, myalgias or shortness of breath.   Loss of Consciousness  The problem has been resolved. Associated symptoms include back pain, bladder incontinence  and weakness. Pertinent negatives include no abdominal pain, auditory change, aura, bowel incontinence, chest pain, clumsiness, confusion, diaphoresis, dizziness, fever, focal sensory loss, focal weakness, headaches, light-headedness, malaise/fatigue, nausea, palpitations, slurred speech, vertigo, visual change or vomiting.       Review of Systems   Constitution: Negative for diaphoresis, fever and malaise/fatigue.   HENT: Negative for nosebleeds.    Eyes: Positive for visual disturbance. Negative for blurred vision and double vision.   Cardiovascular: Negative for chest pain, claudication, dyspnea on exertion, irregular heartbeat, leg swelling, near-syncope, orthopnea, palpitations, paroxysmal nocturnal dyspnea and syncope.   Respiratory: Negative for cough, hemoptysis, shortness of breath, sputum production and wheezing.    Endocrine: Negative for cold intolerance and heat intolerance.   Hematologic/Lymphatic: Negative for bleeding problem. Does not bruise/bleed easily.   Skin: Negative for color change and rash.   Musculoskeletal: Positive for arthritis and back pain. Negative for falls, gout, myalgias, neck pain and stiffness.   Gastrointestinal: Negative for abdominal pain, bowel incontinence, heartburn, hematemesis, hematochezia, hemorrhoids, jaundice, melena, nausea and vomiting.   Genitourinary: Positive for bladder incontinence, frequency and nocturia. Negative for dysuria, hematuria and menorrhagia.   Neurological: Positive for disturbances in coordination and weakness. Negative for excessive daytime sleepiness, dizziness, focal weakness, headaches, light-headedness, loss of balance and vertigo.   Psychiatric/Behavioral: Positive for memory loss. Negative for altered mental status, confusion and depression. The patient is not nervous/anxious.    Allergic/Immunologic: Negative for hives and persistent infections.       Current Outpatient Medications on File Prior to Visit   Medication Sig Dispense Refill     ACETAMINOPHEN (TYLENOL ARTHRITIS ORAL) Take by mouth 2 (two) times daily as needed.      amLODIPine (NORVASC) 5 MG tablet Take 1 tablet (5 mg total) by mouth once daily. 90 tablet 3    CETIRIZINE HCL (ZYRTEC ORAL) Take 10 mg by mouth once daily.       chlorhexidine (PERIDEX) 0.12 % solution   1    cholecalciferol, vitamin D3, (VITAMIN D3) 2,000 unit Cap Take 1 capsule by mouth once daily.      CRANBERRY FRUIT EXTRACT (CRANBERRY EXTRACT ORAL) Take 2 tablets by mouth once daily.      folic acid (FOLVITE) 1 MG tablet once daily.       levothyroxine (SYNTHROID) 25 MCG tablet TAKE 1 TABLET BY MOUTH EVERY DAY 30 tablet 11    levothyroxine (SYNTHROID) 50 MCG tablet Take 25 mcg by mouth before breakfast.   5    lorazepam (ATIVAN) 1 MG tablet Take 1 mg by mouth daily as needed for Anxiety.      methotrexate 2.5 MG Tab Take 2.5 mg by mouth every 7 days. Takes 8 tabs, every Wednesday  0    mirabegron (MYRBETRIQ) 50 mg Tb24 Take by mouth every evening.      sertraline (ZOLOFT) 100 MG tablet 25 mg once daily.       tramadol (ULTRAM) 50 mg tablet Take 50 mg by mouth 2 (two) times daily as needed for Pain.      UNABLE TO FIND 1 tablet once daily. ANNETTE RED      aspirin (ECOTRIN) 81 MG EC tablet Take 1 tablet (81 mg total) by mouth once daily. 90 tablet 3    lactulose (CHRONULAC) 20 gram/30 mL Soln Take 30 mLs (20 g total) by mouth daily as needed. (Patient not taking: Reported on 6/17/2020) 480 mL 0    memantine (NAMENDA TITRATION PACK) tablet pack Take 28 mg by mouth As instructed. Follow package directions.      montelukast (SINGULAIR) 10 mg tablet Take 10 mg by mouth every evening.      POLYETHYLENE GLYCOL 3350 (MIRALAX ORAL) Take by mouth as needed.      ranitidine (ZANTAC) 150 MG capsule Take 150 mg by mouth once daily. Per [facility]      senna (SENOKOT) 8.6 mg tablet Take 1 tablet by mouth 2 (two) times daily. Per [facility]       No current facility-administered medications  on file prior to visit.        /75 Comment: Average at home  Pulse 69   LMP  (LMP Unknown)       Objective:     Physical Exam   Constitutional: She appears well-developed and well-nourished. She does not appear ill. No distress.   HENT:   Head: Normocephalic and atraumatic.   Nose: Nose normal.   Eyes: Right eye exhibits no discharge. Left eye exhibits no discharge. Right conjunctiva is not injected. Left conjunctiva is not injected. No scleral icterus. Right pupil is round. Left pupil is round. Pupils are equal.   Neck: Neck supple. No JVD present. Carotid bruit is not present. No thyromegaly present.   Cardiovascular: Normal rate, regular rhythm, S1 normal and S2 normal.  No extrasystoles are present. Exam reveals no gallop, no S3 and no S4.   No murmur heard.  Pulses:       Radial pulses are 2+ on the right side and 2+ on the left side.        Dorsalis pedis pulses are 2+ on the right side and 2+ on the left side.        Posterior tibial pulses are 2+ on the right side and 2+ on the left side.   Pulmonary/Chest: Effort normal and breath sounds normal.   Abdominal: Soft. Normal appearance. There is no hepatosplenomegaly. There is no abdominal tenderness.   Musculoskeletal:      Right ankle: She exhibits no swelling, no ecchymosis and no deformity.      Left ankle: She exhibits no swelling, no ecchymosis and no deformity.   Lymphadenopathy:        Head (right side): No submandibular adenopathy present.        Head (left side): No submandibular adenopathy present.     She has no cervical adenopathy.   Neurological: She is alert. She is disoriented.   Skin: Skin is warm, dry and intact. No rash noted. She is not diaphoretic. Nails show no clubbing.   Psychiatric: She has a normal mood and affect. Cognition and memory are impaired.       Assessment:     1. Bilateral carotid artery stenosis    2. History of syncope    3. Right bundle branch block    4. Essential hypertension    5. Hypercholesteremia    6.  Hypothyroidism due to acquired atrophy of thyroid    7. Rheumatoid arthritis involving both knees with positive rheumatoid factor    8. Late onset Alzheimer's disease without behavioral disturbance        Plan:     1. Carotid Artery Disease   10/3/2013: Carotid Duplex: EVERT: Mild. RECA: 2.0 m/s. LICA: Mild.   10/28/2015: Carotid Duplex: EVERT: Moderate plaquing. LICA: Moderate plaquing.   10/24/2017: Carotid Duplex: EVERT: Moderate plaquing - <50%. LICA: Severe plaquing - 50-60%.   10/3/2018: Carotid Duplex: EVERT: Moderate plaquing - <50%. LICA: Moderate plaquing - <50%.   On aspirin 81 mg Q24.     Stable.     2. History of Syncope   4/13/2015: Syncope.   Related to bathroom visit.   No recurrence.    3. Right Bundle Branch Block   2/6/2015: Diagnosed with right bundle branch block.     4. Hypertension   2008: Diagnosed.   On amlodipine 5 mg Q24.   Keeping log at home.   Well controlled.    5. Hypercholesterolemia   8/5/2013: Chol 277. HDL 75. . .   Not treated in setting of Calcium Score of 0, high HDL with intolerance to statin.   9/24/2015: Chol 221. HDL 60. . TG 81.    Reassurance.    6. Hypothyroidism   2013: Diagnosed.   On levothyroxine 25 mcg Q24.    7. Rheumatic Arthritis   2005: Diagnosed.   On methotrexate.   Dr. Omar Howe.    8. Mental Decline   2017: Diagnosed with dementia.   On Namenda.    9. Social Issues   2019: Moved in to [facility].   2020: Moved back to her home.    10. Primary Care   Dr. Rach Henriquez.     F/u 6 months.    Tea Weeks M.D.

## (undated) DEVICE — SEE MEDLINE ITEM 146345

## (undated) DEVICE — DRAPE C-ARMOR EQUIPMENT COVER

## (undated) DEVICE — DRAPE C-ARM FOR MOBILE XRAY

## (undated) DEVICE — STAPLER SKIN PROXIMATE WIDE

## (undated) DEVICE — BIT DRILL 4.2MM X 130MM

## (undated) DEVICE — CHLORAPREP W TINT 26ML APPL

## (undated) DEVICE — BANDAGE ACE ELASTIC 6"

## (undated) DEVICE — GAUZE SPONGE 4X4 12PLY

## (undated) DEVICE — SUPPORT ULNA NERVE PROTECTOR

## (undated) DEVICE — DRAPE STERI-DRAPE 83X125 IOBAN

## (undated) DEVICE — WIRE KIRSCHNER T2 3X285MM SS
Type: IMPLANTABLE DEVICE | Site: FEMUR | Status: NON-FUNCTIONAL
Removed: 2017-06-08

## (undated) DEVICE — COVER OVERHEAD SURG LT BLUE

## (undated) DEVICE — SUT 2/0 36IN COATED VICRYL

## (undated) DEVICE — SOL 9P NACL IRR PIC IL

## (undated) DEVICE — BLANKET UPPER BODY 78.7X29.9IN

## (undated) DEVICE — GUIDEWIRE 3.2 X 450
Type: IMPLANTABLE DEVICE | Site: FEMUR | Status: NON-FUNCTIONAL
Removed: 2017-06-08

## (undated) DEVICE — GLOVE SURGICAL LATEX SZ 8

## (undated) DEVICE — ELECTRODE REM PLYHSV RETURN 9

## (undated) DEVICE — SEE MEDLINE ITEM 146292

## (undated) DEVICE — REAMER MOD BIXCUT 8X48MM STER.

## (undated) DEVICE — GUIDE WIRE 3.0X1000MM BALL TIP
Type: IMPLANTABLE DEVICE | Site: FEMUR | Status: NON-FUNCTIONAL
Removed: 2017-06-08

## (undated) DEVICE — DRAPE PLASTIC U 60X72

## (undated) DEVICE — SEE MEDLINE ITEM 157117

## (undated) DEVICE — DRAPE STERI U-SHAPED 47X51IN

## (undated) DEVICE — LINER GLOVE POWDERFREE 8

## (undated) DEVICE — PACK DRAPE UNIVERSAL CONVERTOR

## (undated) DEVICE — SUT CTD VICRYL 0 UND BR CT

## (undated) DEVICE — Device

## (undated) DEVICE — POSITIONER IV ARMBOARD FOAM

## (undated) DEVICE — KIT PT CARE HANA PROFX SSXT

## (undated) DEVICE — SEE MEDLINE ITEM 152622